# Patient Record
Sex: FEMALE | Race: WHITE | NOT HISPANIC OR LATINO | Employment: FULL TIME | URBAN - METROPOLITAN AREA
[De-identification: names, ages, dates, MRNs, and addresses within clinical notes are randomized per-mention and may not be internally consistent; named-entity substitution may affect disease eponyms.]

---

## 2017-01-04 ENCOUNTER — COMMUNICATION - HEALTHEAST (OUTPATIENT)
Dept: FAMILY MEDICINE | Facility: CLINIC | Age: 32
End: 2017-01-04

## 2017-02-01 ENCOUNTER — OFFICE VISIT - HEALTHEAST (OUTPATIENT)
Dept: FAMILY MEDICINE | Facility: CLINIC | Age: 32
End: 2017-02-01

## 2017-02-01 DIAGNOSIS — Z23 NEED FOR VACCINATION: ICD-10-CM

## 2017-02-01 DIAGNOSIS — N93.8 DYSFUNCTIONAL UTERINE BLEEDING: ICD-10-CM

## 2017-02-01 DIAGNOSIS — R10.2 PELVIC PAIN: ICD-10-CM

## 2017-02-20 ENCOUNTER — COMMUNICATION - HEALTHEAST (OUTPATIENT)
Dept: FAMILY MEDICINE | Facility: CLINIC | Age: 32
End: 2017-02-20

## 2017-02-24 ENCOUNTER — OFFICE VISIT - HEALTHEAST (OUTPATIENT)
Dept: FAMILY MEDICINE | Facility: CLINIC | Age: 32
End: 2017-02-24

## 2017-02-24 DIAGNOSIS — G47.33 OBSTRUCTIVE SLEEP APNEA (ADULT) (PEDIATRIC): ICD-10-CM

## 2017-02-24 DIAGNOSIS — E78.1 PURE HYPERGLYCERIDEMIA: ICD-10-CM

## 2017-02-24 ASSESSMENT — MIFFLIN-ST. JEOR: SCORE: 1734.82

## 2017-03-28 ENCOUNTER — OFFICE VISIT - HEALTHEAST (OUTPATIENT)
Dept: FAMILY MEDICINE | Facility: CLINIC | Age: 32
End: 2017-03-28

## 2017-03-28 ENCOUNTER — COMMUNICATION - HEALTHEAST (OUTPATIENT)
Dept: FAMILY MEDICINE | Facility: CLINIC | Age: 32
End: 2017-03-28

## 2017-03-28 DIAGNOSIS — Z30.9 CONTRACEPTION MANAGEMENT: ICD-10-CM

## 2017-03-28 DIAGNOSIS — E78.1 PURE HYPERGLYCERIDEMIA: ICD-10-CM

## 2017-03-28 DIAGNOSIS — G47.33 OBSTRUCTIVE SLEEP APNEA (ADULT) (PEDIATRIC): ICD-10-CM

## 2017-03-28 ASSESSMENT — MIFFLIN-ST. JEOR: SCORE: 1732.56

## 2017-04-25 ENCOUNTER — OFFICE VISIT - HEALTHEAST (OUTPATIENT)
Dept: FAMILY MEDICINE | Facility: CLINIC | Age: 32
End: 2017-04-25

## 2017-04-25 DIAGNOSIS — E78.1 PURE HYPERGLYCERIDEMIA: ICD-10-CM

## 2017-04-25 DIAGNOSIS — G47.33 OBSTRUCTIVE SLEEP APNEA (ADULT) (PEDIATRIC): ICD-10-CM

## 2017-04-25 LAB
CHOLEST SERPL-MCNC: 167 MG/DL
FASTING STATUS PATIENT QL REPORTED: YES
HBA1C MFR BLD: 5.1 % (ref 3.5–6)
HDLC SERPL-MCNC: 44 MG/DL
LDLC SERPL CALC-MCNC: 98 MG/DL
TRIGL SERPL-MCNC: 123 MG/DL

## 2017-04-25 ASSESSMENT — MIFFLIN-ST. JEOR: SCORE: 1737.55

## 2017-05-10 ENCOUNTER — COMMUNICATION - HEALTHEAST (OUTPATIENT)
Dept: FAMILY MEDICINE | Facility: CLINIC | Age: 32
End: 2017-05-10

## 2017-05-24 ENCOUNTER — COMMUNICATION - HEALTHEAST (OUTPATIENT)
Dept: FAMILY MEDICINE | Facility: CLINIC | Age: 32
End: 2017-05-24

## 2017-05-24 DIAGNOSIS — Z30.9 CONTRACEPTION MANAGEMENT: ICD-10-CM

## 2017-05-31 ENCOUNTER — COMMUNICATION - HEALTHEAST (OUTPATIENT)
Dept: FAMILY MEDICINE | Facility: CLINIC | Age: 32
End: 2017-05-31

## 2017-06-07 ENCOUNTER — OFFICE VISIT - HEALTHEAST (OUTPATIENT)
Dept: FAMILY MEDICINE | Facility: CLINIC | Age: 32
End: 2017-06-07

## 2017-06-07 DIAGNOSIS — F41.1 ANXIETY STATE: ICD-10-CM

## 2017-07-28 ENCOUNTER — COMMUNICATION - HEALTHEAST (OUTPATIENT)
Dept: FAMILY MEDICINE | Facility: CLINIC | Age: 32
End: 2017-07-28

## 2017-08-01 ENCOUNTER — OFFICE VISIT - HEALTHEAST (OUTPATIENT)
Dept: FAMILY MEDICINE | Facility: CLINIC | Age: 32
End: 2017-08-01

## 2017-08-01 DIAGNOSIS — N92.6 MISSED MENSES: ICD-10-CM

## 2017-08-14 ENCOUNTER — COMMUNICATION - HEALTHEAST (OUTPATIENT)
Dept: FAMILY MEDICINE | Facility: CLINIC | Age: 32
End: 2017-08-14

## 2017-08-15 ENCOUNTER — HOSPITAL ENCOUNTER (OUTPATIENT)
Dept: ULTRASOUND IMAGING | Facility: CLINIC | Age: 32
Discharge: HOME | End: 2017-08-15
Attending: FAMILY MEDICINE

## 2017-08-15 DIAGNOSIS — N92.6 MISSED MENSES: ICD-10-CM

## 2017-08-23 ENCOUNTER — COMMUNICATION - HEALTHEAST (OUTPATIENT)
Dept: FAMILY MEDICINE | Facility: CLINIC | Age: 32
End: 2017-08-23

## 2017-09-27 ENCOUNTER — COMMUNICATION - HEALTHEAST (OUTPATIENT)
Dept: FAMILY MEDICINE | Facility: CLINIC | Age: 32
End: 2017-09-27

## 2017-10-11 ENCOUNTER — PRENATAL OFFICE VISIT - HEALTHEAST (OUTPATIENT)
Dept: FAMILY MEDICINE | Facility: CLINIC | Age: 32
End: 2017-10-11

## 2017-10-11 ENCOUNTER — COMMUNICATION - HEALTHEAST (OUTPATIENT)
Dept: FAMILY MEDICINE | Facility: CLINIC | Age: 32
End: 2017-10-11

## 2017-10-11 DIAGNOSIS — Z12.4 CERVICAL CANCER SCREENING: ICD-10-CM

## 2017-10-11 DIAGNOSIS — F41.1 ANXIETY STATE: ICD-10-CM

## 2017-10-11 DIAGNOSIS — R07.9 CHEST PAIN: ICD-10-CM

## 2017-10-11 DIAGNOSIS — Z34.81 ENCOUNTER FOR SUPERVISION OF OTHER NORMAL PREGNANCY IN FIRST TRIMESTER: ICD-10-CM

## 2017-10-11 DIAGNOSIS — Z23 IMMUNIZATION DUE: ICD-10-CM

## 2017-10-11 LAB
ATRIAL RATE - MUSE: 80 BPM
DIASTOLIC BLOOD PRESSURE - MUSE: NORMAL MMHG
HIV 1+2 AB+HIV1 P24 AG SERPL QL IA: NEGATIVE
INTERPRETATION ECG - MUSE: NORMAL
P AXIS - MUSE: 53 DEGREES
PR INTERVAL - MUSE: 130 MS
QRS DURATION - MUSE: 78 MS
QT - MUSE: 364 MS
QTC - MUSE: 419 MS
R AXIS - MUSE: 48 DEGREES
SYSTOLIC BLOOD PRESSURE - MUSE: NORMAL MMHG
T AXIS - MUSE: 45 DEGREES
VENTRICULAR RATE- MUSE: 80 BPM

## 2017-10-12 LAB
ANTIBODY ID: NORMAL
HBV SURFACE AG SERPL QL IA: NEGATIVE
SYPHILIS RPR SCREEN - HISTORICAL: NORMAL

## 2017-10-16 ENCOUNTER — AMBULATORY - HEALTHEAST (OUTPATIENT)
Dept: FAMILY MEDICINE | Facility: CLINIC | Age: 32
End: 2017-10-16

## 2017-10-16 DIAGNOSIS — O36.0920: ICD-10-CM

## 2017-10-17 LAB
HPV INTERPRETATION - HISTORICAL: NORMAL
HPV INTERPRETER - HISTORICAL: NORMAL

## 2017-10-18 LAB
BKR LAB AP ABNORMAL BLEEDING: NO
BKR LAB AP BIRTH CONTROL/HORMONES: ABNORMAL
BKR LAB AP CERVICAL APPEARANCE: NORMAL
BKR LAB AP GYN ADEQUACY: ABNORMAL
BKR LAB AP GYN INTERPRETATION: ABNORMAL
BKR LAB AP HPV REFLEX: ABNORMAL
BKR LAB AP LMP: ABNORMAL
BKR LAB AP PATIENT STATUS: ABNORMAL
BKR LAB AP PREVIOUS ABNORMAL: ABNORMAL
BKR LAB AP PREVIOUS NORMAL: ABNORMAL
HIGH RISK?: NO
INTERPRETING LAB: ABNORMAL
PATH REPORT.COMMENTS IMP SPEC: ABNORMAL
RESULT FLAG (HE HISTORICAL CONVERSION): ABNORMAL

## 2017-10-19 ENCOUNTER — COMMUNICATION - HEALTHEAST (OUTPATIENT)
Dept: FAMILY MEDICINE | Facility: CLINIC | Age: 32
End: 2017-10-19

## 2017-10-19 LAB
ANTIBODY TITERED: NORMAL
SPECIMEN STATUS: NORMAL
TITER RESULT IAT: NORMAL

## 2017-10-24 ENCOUNTER — COMMUNICATION - HEALTHEAST (OUTPATIENT)
Dept: FAMILY MEDICINE | Facility: CLINIC | Age: 32
End: 2017-10-24

## 2017-10-25 ENCOUNTER — COMMUNICATION - HEALTHEAST (OUTPATIENT)
Dept: FAMILY MEDICINE | Facility: CLINIC | Age: 32
End: 2017-10-25

## 2017-10-27 ENCOUNTER — RECORDS - HEALTHEAST (OUTPATIENT)
Dept: ADMINISTRATIVE | Facility: OTHER | Age: 32
End: 2017-10-27

## 2017-11-01 ENCOUNTER — RECORDS - HEALTHEAST (OUTPATIENT)
Dept: ADMINISTRATIVE | Facility: OTHER | Age: 32
End: 2017-11-01

## 2017-11-14 ENCOUNTER — PRENATAL OFFICE VISIT - HEALTHEAST (OUTPATIENT)
Dept: FAMILY MEDICINE | Facility: CLINIC | Age: 32
End: 2017-11-14

## 2017-11-14 DIAGNOSIS — Z34.81 ENCOUNTER FOR SUPERVISION OF OTHER NORMAL PREGNANCY IN FIRST TRIMESTER: ICD-10-CM

## 2017-11-14 DIAGNOSIS — O36.0920: ICD-10-CM

## 2017-11-15 LAB — ANTIBODY SCREEN: NORMAL

## 2017-11-16 LAB — ANTIBODY UNIDENTIFIED: NORMAL

## 2017-11-21 ENCOUNTER — HOSPITAL ENCOUNTER (OUTPATIENT)
Dept: ULTRASOUND IMAGING | Facility: CLINIC | Age: 32
Discharge: HOME | End: 2017-11-21
Attending: FAMILY MEDICINE

## 2017-11-21 DIAGNOSIS — Z34.81 ENCOUNTER FOR SUPERVISION OF OTHER NORMAL PREGNANCY IN FIRST TRIMESTER: ICD-10-CM

## 2017-12-04 ENCOUNTER — COMMUNICATION - HEALTHEAST (OUTPATIENT)
Dept: FAMILY MEDICINE | Facility: CLINIC | Age: 32
End: 2017-12-04

## 2017-12-12 ENCOUNTER — PRENATAL OFFICE VISIT - HEALTHEAST (OUTPATIENT)
Dept: FAMILY MEDICINE | Facility: CLINIC | Age: 32
End: 2017-12-12

## 2017-12-12 DIAGNOSIS — O09.892 SUPERVISION OF OTHER HIGH RISK PREGNANCIES, SECOND TRIMESTER: ICD-10-CM

## 2017-12-12 DIAGNOSIS — O36.0920: ICD-10-CM

## 2017-12-15 ENCOUNTER — COMMUNICATION - HEALTHEAST (OUTPATIENT)
Dept: FAMILY MEDICINE | Facility: CLINIC | Age: 32
End: 2017-12-15

## 2017-12-26 ENCOUNTER — COMMUNICATION - HEALTHEAST (OUTPATIENT)
Dept: FAMILY MEDICINE | Facility: CLINIC | Age: 32
End: 2017-12-26

## 2018-01-04 ENCOUNTER — HOSPITAL ENCOUNTER (OUTPATIENT)
Dept: ULTRASOUND IMAGING | Facility: CLINIC | Age: 33
Discharge: HOME | End: 2018-01-04
Attending: FAMILY MEDICINE

## 2018-01-04 ENCOUNTER — COMMUNICATION - HEALTHEAST (OUTPATIENT)
Dept: FAMILY MEDICINE | Facility: CLINIC | Age: 33
End: 2018-01-04

## 2018-01-04 DIAGNOSIS — O36.8190 DECREASED FETAL MOVEMENT: ICD-10-CM

## 2018-01-08 ENCOUNTER — PRENATAL OFFICE VISIT - HEALTHEAST (OUTPATIENT)
Dept: FAMILY MEDICINE | Facility: CLINIC | Age: 33
End: 2018-01-08

## 2018-01-08 DIAGNOSIS — Z34.90 SUPERVISION OF NORMAL PREGNANCY: ICD-10-CM

## 2018-01-08 DIAGNOSIS — O36.0920: ICD-10-CM

## 2018-01-08 LAB
ALBUMIN UR-MCNC: NEGATIVE MG/DL
APPEARANCE UR: ABNORMAL
BILIRUB UR QL STRIP: NEGATIVE
COLOR UR AUTO: YELLOW
FASTING STATUS PATIENT QL REPORTED: ABNORMAL
GLUCOSE 1H P 50 G GLC PO SERPL-MCNC: 68 MG/DL (ref 70–139)
GLUCOSE UR STRIP-MCNC: NEGATIVE MG/DL
HGB BLD-MCNC: 11.9 G/DL (ref 12–16)
HGB UR QL STRIP: ABNORMAL
KETONES UR STRIP-MCNC: NEGATIVE MG/DL
LEUKOCYTE ESTERASE UR QL STRIP: ABNORMAL
NITRATE UR QL: NEGATIVE
PH UR STRIP: 8.5 [PH] (ref 5–8)
SP GR UR STRIP: 1.01 (ref 1–1.03)
UROBILINOGEN UR STRIP-ACNC: ABNORMAL

## 2018-01-09 LAB
ANTIBODY SCREEN: NEGATIVE
SYPHILIS RPR SCREEN - HISTORICAL: NORMAL

## 2018-01-14 ENCOUNTER — COMMUNICATION - HEALTHEAST (OUTPATIENT)
Dept: FAMILY MEDICINE | Facility: CLINIC | Age: 33
End: 2018-01-14

## 2018-01-15 ENCOUNTER — COMMUNICATION - HEALTHEAST (OUTPATIENT)
Dept: FAMILY MEDICINE | Facility: CLINIC | Age: 33
End: 2018-01-15

## 2018-01-22 ENCOUNTER — COMMUNICATION - HEALTHEAST (OUTPATIENT)
Dept: FAMILY MEDICINE | Facility: CLINIC | Age: 33
End: 2018-01-22

## 2018-01-30 ENCOUNTER — PRENATAL OFFICE VISIT - HEALTHEAST (OUTPATIENT)
Dept: FAMILY MEDICINE | Facility: CLINIC | Age: 33
End: 2018-01-30

## 2018-01-30 DIAGNOSIS — Z34.82 ENCOUNTER FOR SUPERVISION OF OTHER NORMAL PREGNANCY IN SECOND TRIMESTER: ICD-10-CM

## 2018-01-30 DIAGNOSIS — O36.0920: ICD-10-CM

## 2018-01-31 ENCOUNTER — HOSPITAL ENCOUNTER (OUTPATIENT)
Dept: ULTRASOUND IMAGING | Facility: CLINIC | Age: 33
Discharge: HOME | End: 2018-01-31
Attending: FAMILY MEDICINE

## 2018-01-31 DIAGNOSIS — Z34.82 ENCOUNTER FOR SUPERVISION OF OTHER NORMAL PREGNANCY IN SECOND TRIMESTER: ICD-10-CM

## 2018-02-01 LAB
ANTIBODY ID: NORMAL
ANTIBODY SCREEN: POSITIVE

## 2018-02-06 ENCOUNTER — COMMUNICATION - HEALTHEAST (OUTPATIENT)
Dept: FAMILY MEDICINE | Facility: CLINIC | Age: 33
End: 2018-02-06

## 2018-02-06 LAB
ANTIBODY TITERED: NORMAL
ANTIBODY TITERED: NORMAL
MISCELLANEOUS TEST DEPT. - HE HISTORICAL: NORMAL
PERFORMING LAB: NORMAL
SPECIMEN STATUS: NORMAL
TEST NAME: NORMAL
TITER RESULT IAT: NORMAL
TITER RESULT IAT: NORMAL

## 2018-02-08 ENCOUNTER — OFFICE VISIT - HEALTHEAST (OUTPATIENT)
Dept: FAMILY MEDICINE | Facility: CLINIC | Age: 33
End: 2018-02-08

## 2018-02-08 DIAGNOSIS — Z34.90 PREGNANCY: ICD-10-CM

## 2018-02-08 DIAGNOSIS — B34.9 VIRAL SYNDROME: ICD-10-CM

## 2018-02-08 DIAGNOSIS — R05.9 COUGH: ICD-10-CM

## 2018-02-08 LAB
FLUAV AG SPEC QL IA: NORMAL
FLUBV AG SPEC QL IA: NORMAL

## 2018-02-13 ENCOUNTER — PRENATAL OFFICE VISIT - HEALTHEAST (OUTPATIENT)
Dept: FAMILY MEDICINE | Facility: CLINIC | Age: 33
End: 2018-02-13

## 2018-02-13 DIAGNOSIS — O36.0920 ANTIBODY E ISOIMMUNIZATION AFFECTING PREGNANCY IN SECOND TRIMESTER, SINGLE OR UNSPECIFIED FETUS: ICD-10-CM

## 2018-02-13 DIAGNOSIS — Z34.93 SUPERVISION OF NORMAL PREGNANCY IN THIRD TRIMESTER: ICD-10-CM

## 2018-02-13 LAB
ALBUMIN UR-MCNC: NEGATIVE MG/DL
APPEARANCE UR: CLEAR
BILIRUB UR QL STRIP: NEGATIVE
COLOR UR AUTO: YELLOW
GLUCOSE UR STRIP-MCNC: NEGATIVE MG/DL
HGB UR QL STRIP: NEGATIVE
KETONES UR STRIP-MCNC: NEGATIVE MG/DL
LEUKOCYTE ESTERASE UR QL STRIP: NEGATIVE
NITRATE UR QL: NEGATIVE
PH UR STRIP: 6 [PH] (ref 5–8)
SP GR UR STRIP: 1.01 (ref 1–1.03)
UROBILINOGEN UR STRIP-ACNC: NORMAL

## 2018-02-21 ENCOUNTER — HOSPITAL ENCOUNTER (OUTPATIENT)
Dept: MEDSURG UNIT | Facility: CLINIC | Age: 33
Discharge: HOME | End: 2018-02-21
Attending: FAMILY MEDICINE | Admitting: FAMILY MEDICINE

## 2018-02-21 ENCOUNTER — COMMUNICATION - HEALTHEAST (OUTPATIENT)
Dept: FAMILY MEDICINE | Facility: CLINIC | Age: 33
End: 2018-02-21

## 2018-02-21 ASSESSMENT — MIFFLIN-ST. JEOR: SCORE: 1754.56

## 2018-02-27 ENCOUNTER — PRENATAL OFFICE VISIT - HEALTHEAST (OUTPATIENT)
Dept: FAMILY MEDICINE | Facility: CLINIC | Age: 33
End: 2018-02-27

## 2018-02-27 DIAGNOSIS — Z34.82 ENCOUNTER FOR SUPERVISION OF OTHER NORMAL PREGNANCY IN SECOND TRIMESTER: ICD-10-CM

## 2018-02-27 DIAGNOSIS — Z34.81 ENCOUNTER FOR SUPERVISION OF OTHER NORMAL PREGNANCY IN FIRST TRIMESTER: ICD-10-CM

## 2018-02-27 DIAGNOSIS — O36.0920: ICD-10-CM

## 2018-02-27 DIAGNOSIS — O36.0920 ANTIBODY E ISOIMMUNIZATION AFFECTING PREGNANCY IN SECOND TRIMESTER, SINGLE OR UNSPECIFIED FETUS: ICD-10-CM

## 2018-02-27 DIAGNOSIS — L30.9 DERMATITIS: ICD-10-CM

## 2018-02-28 LAB
ANTIBODY ID: NORMAL
ANTIBODY SCREEN: ABNORMAL

## 2018-03-03 LAB
ANTIBODY TITERED: NORMAL
ANTIBODY TITERED: NORMAL
SPECIMEN STATUS: NORMAL
SPECIMEN STATUS: NORMAL
TITER RESULT IAT: NORMAL
TITER RESULT IAT: NORMAL

## 2018-03-08 ENCOUNTER — HOSPITAL ENCOUNTER (OUTPATIENT)
Dept: ULTRASOUND IMAGING | Facility: CLINIC | Age: 33
Discharge: HOME | End: 2018-03-08
Attending: FAMILY MEDICINE

## 2018-03-08 DIAGNOSIS — Z34.83 ENCOUNTER FOR SUPERVISION OF OTHER NORMAL PREGNANCY IN THIRD TRIMESTER: ICD-10-CM

## 2018-03-13 ENCOUNTER — PRENATAL OFFICE VISIT - HEALTHEAST (OUTPATIENT)
Dept: FAMILY MEDICINE | Facility: CLINIC | Age: 33
End: 2018-03-13

## 2018-03-13 DIAGNOSIS — Z34.90 SUPERVISION OF NORMAL PREGNANCY: ICD-10-CM

## 2018-03-13 LAB
ALBUMIN UR-MCNC: NEGATIVE MG/DL
ALT SERPL W P-5'-P-CCNC: <9 U/L (ref 0–45)
APPEARANCE UR: ABNORMAL
APTT PPP: 28 SECONDS (ref 24–37)
AST SERPL W P-5'-P-CCNC: 9 U/L (ref 0–40)
BILIRUB UR QL STRIP: NEGATIVE
BUN SERPL-MCNC: 8 MG/DL (ref 8–22)
COLOR UR AUTO: YELLOW
CREAT SERPL-MCNC: 0.57 MG/DL (ref 0.6–1.1)
ERYTHROCYTE [DISTWIDTH] IN BLOOD BY AUTOMATED COUNT: 11.3 % (ref 11–14.5)
GFR SERPL CREATININE-BSD FRML MDRD: >60 ML/MIN/1.73M2
GLUCOSE UR STRIP-MCNC: NEGATIVE MG/DL
HCT VFR BLD AUTO: 35.5 % (ref 35–47)
HGB BLD-MCNC: 11.9 G/DL (ref 12–16)
HGB UR QL STRIP: ABNORMAL
INR PPP: 0.94 (ref 0.9–1.1)
KETONES UR STRIP-MCNC: NEGATIVE MG/DL
LEUKOCYTE ESTERASE UR QL STRIP: ABNORMAL
MCH RBC QN AUTO: 28.4 PG (ref 27–34)
MCHC RBC AUTO-ENTMCNC: 33.6 G/DL (ref 32–36)
MCV RBC AUTO: 85 FL (ref 80–100)
NITRATE UR QL: NEGATIVE
PH UR STRIP: 5.5 [PH] (ref 5–8)
PLATELET # BLD AUTO: 180 THOU/UL (ref 140–440)
PMV BLD AUTO: 8.8 FL (ref 7–10)
RBC # BLD AUTO: 4.18 MILL/UL (ref 3.8–5.4)
SP GR UR STRIP: 1.02 (ref 1–1.03)
URATE SERPL-MCNC: 4.2 MG/DL (ref 2–7.5)
UROBILINOGEN UR STRIP-ACNC: ABNORMAL
WBC: 8.8 THOU/UL (ref 4–11)

## 2018-03-14 ENCOUNTER — AMBULATORY - HEALTHEAST (OUTPATIENT)
Dept: NURSING | Facility: CLINIC | Age: 33
End: 2018-03-14

## 2018-03-15 ENCOUNTER — COMMUNICATION - HEALTHEAST (OUTPATIENT)
Dept: FAMILY MEDICINE | Facility: CLINIC | Age: 33
End: 2018-03-15

## 2018-03-15 LAB
ALLERGIC TO PENICILLIN: NO
GP B STREP DNA SPEC QL NAA+PROBE: NEGATIVE

## 2018-03-16 ENCOUNTER — COMMUNICATION - HEALTHEAST (OUTPATIENT)
Dept: FAMILY MEDICINE | Facility: CLINIC | Age: 33
End: 2018-03-16

## 2018-03-20 ENCOUNTER — COMMUNICATION - HEALTHEAST (OUTPATIENT)
Dept: FAMILY MEDICINE | Facility: CLINIC | Age: 33
End: 2018-03-20

## 2018-03-22 ENCOUNTER — HOSPITAL ENCOUNTER (OUTPATIENT)
Dept: MEDSURG UNIT | Facility: CLINIC | Age: 33
Discharge: HOME | End: 2018-03-22
Attending: FAMILY MEDICINE | Admitting: FAMILY MEDICINE

## 2018-03-23 ENCOUNTER — PRENATAL OFFICE VISIT - HEALTHEAST (OUTPATIENT)
Dept: FAMILY MEDICINE | Facility: CLINIC | Age: 33
End: 2018-03-23

## 2018-03-23 DIAGNOSIS — Z34.90 SUPERVISION OF NORMAL PREGNANCY: ICD-10-CM

## 2018-03-23 DIAGNOSIS — O36.0920: ICD-10-CM

## 2018-03-23 DIAGNOSIS — Z34.82 ENCOUNTER FOR SUPERVISION OF OTHER NORMAL PREGNANCY IN SECOND TRIMESTER: ICD-10-CM

## 2018-03-23 LAB
ALBUMIN UR-MCNC: ABNORMAL MG/DL
ALT SERPL W P-5'-P-CCNC: <9 U/L (ref 0–45)
APPEARANCE UR: CLEAR
APTT PPP: 27 SECONDS (ref 24–37)
AST SERPL W P-5'-P-CCNC: 9 U/L (ref 0–40)
BILIRUB UR QL STRIP: NEGATIVE
BUN SERPL-MCNC: 7 MG/DL (ref 8–22)
COLOR UR AUTO: YELLOW
CREAT SERPL-MCNC: 0.55 MG/DL (ref 0.6–1.1)
CREAT UR-MCNC: 156.8 MG/DL
ERYTHROCYTE [DISTWIDTH] IN BLOOD BY AUTOMATED COUNT: 11.6 % (ref 11–14.5)
GFR SERPL CREATININE-BSD FRML MDRD: >60 ML/MIN/1.73M2
GLUCOSE UR STRIP-MCNC: NEGATIVE MG/DL
HCT VFR BLD AUTO: 35.1 % (ref 35–47)
HGB BLD-MCNC: 11.8 G/DL (ref 12–16)
HGB UR QL STRIP: NEGATIVE
INR PPP: 0.91 (ref 0.9–1.1)
KETONES UR STRIP-MCNC: NEGATIVE MG/DL
LEUKOCYTE ESTERASE UR QL STRIP: ABNORMAL
MCH RBC QN AUTO: 28.4 PG (ref 27–34)
MCHC RBC AUTO-ENTMCNC: 33.7 G/DL (ref 32–36)
MCV RBC AUTO: 84 FL (ref 80–100)
NITRATE UR QL: NEGATIVE
PH UR STRIP: 5.5 [PH] (ref 5–8)
PLATELET # BLD AUTO: 176 THOU/UL (ref 140–440)
PMV BLD AUTO: 9.3 FL (ref 7–10)
PROTEIN, RANDOM URINE - HISTORICAL: 14 MG/DL
PROTEIN/CREAT RATIO, RANDOM UR: 0.09
RBC # BLD AUTO: 4.15 MILL/UL (ref 3.8–5.4)
SP GR UR STRIP: >=1.03 (ref 1–1.03)
URATE SERPL-MCNC: 4.4 MG/DL (ref 2–7.5)
UROBILINOGEN UR STRIP-ACNC: ABNORMAL
WBC: 7.5 THOU/UL (ref 4–11)

## 2018-03-24 LAB — ANTIBODY ID: NORMAL

## 2018-03-27 ENCOUNTER — PRENATAL OFFICE VISIT - HEALTHEAST (OUTPATIENT)
Dept: FAMILY MEDICINE | Facility: CLINIC | Age: 33
End: 2018-03-27

## 2018-03-27 DIAGNOSIS — Z34.90 SUPERVISION OF NORMAL PREGNANCY: ICD-10-CM

## 2018-03-27 LAB
ALBUMIN UR-MCNC: ABNORMAL MG/DL
APPEARANCE UR: ABNORMAL
BILIRUB UR QL STRIP: NEGATIVE
COLOR UR AUTO: YELLOW
GLUCOSE UR STRIP-MCNC: NEGATIVE MG/DL
HGB UR QL STRIP: NEGATIVE
KETONES UR STRIP-MCNC: ABNORMAL MG/DL
LEUKOCYTE ESTERASE UR QL STRIP: ABNORMAL
NITRATE UR QL: NEGATIVE
PH UR STRIP: 7.5 [PH] (ref 5–8)
SP GR UR STRIP: 1.02 (ref 1–1.03)
UROBILINOGEN UR STRIP-ACNC: ABNORMAL

## 2018-03-29 ENCOUNTER — ANESTHESIA - HEALTHEAST (OUTPATIENT)
Dept: OBGYN | Facility: CLINIC | Age: 33
End: 2018-03-29

## 2018-03-29 LAB
ANTIBODY TITERED: NORMAL
SPECIMEN STATUS: NORMAL
TITER RESULT IAT: NORMAL

## 2018-03-31 ENCOUNTER — HOME CARE/HOSPICE - HEALTHEAST (OUTPATIENT)
Dept: HOME HEALTH SERVICES | Facility: HOME HEALTH | Age: 33
End: 2018-03-31

## 2018-04-02 ENCOUNTER — HOME CARE/HOSPICE - HEALTHEAST (OUTPATIENT)
Dept: HOME HEALTH SERVICES | Facility: HOME HEALTH | Age: 33
End: 2018-04-02

## 2018-04-19 ENCOUNTER — COMMUNICATION - HEALTHEAST (OUTPATIENT)
Dept: FAMILY MEDICINE | Facility: CLINIC | Age: 33
End: 2018-04-19

## 2018-04-19 DIAGNOSIS — F41.1 ANXIETY STATE: ICD-10-CM

## 2018-05-11 ENCOUNTER — OFFICE VISIT - HEALTHEAST (OUTPATIENT)
Dept: FAMILY MEDICINE | Facility: CLINIC | Age: 33
End: 2018-05-11

## 2018-05-11 DIAGNOSIS — Z30.9 CONTRACEPTION MANAGEMENT: ICD-10-CM

## 2018-05-11 DIAGNOSIS — F41.1 ANXIETY STATE: ICD-10-CM

## 2018-06-06 ENCOUNTER — COMMUNICATION - HEALTHEAST (OUTPATIENT)
Dept: FAMILY MEDICINE | Facility: CLINIC | Age: 33
End: 2018-06-06

## 2018-06-28 ENCOUNTER — COMMUNICATION - HEALTHEAST (OUTPATIENT)
Dept: FAMILY MEDICINE | Facility: CLINIC | Age: 33
End: 2018-06-28

## 2018-07-06 ENCOUNTER — COMMUNICATION - HEALTHEAST (OUTPATIENT)
Dept: FAMILY MEDICINE | Facility: CLINIC | Age: 33
End: 2018-07-06

## 2018-07-06 DIAGNOSIS — F41.1 ANXIETY STATE: ICD-10-CM

## 2018-07-09 ENCOUNTER — COMMUNICATION - HEALTHEAST (OUTPATIENT)
Dept: FAMILY MEDICINE | Facility: CLINIC | Age: 33
End: 2018-07-09

## 2018-07-09 DIAGNOSIS — Z30.9 CONTRACEPTION MANAGEMENT: ICD-10-CM

## 2018-08-16 ENCOUNTER — COMMUNICATION - HEALTHEAST (OUTPATIENT)
Dept: FAMILY MEDICINE | Facility: CLINIC | Age: 33
End: 2018-08-16

## 2018-09-10 ENCOUNTER — COMMUNICATION - HEALTHEAST (OUTPATIENT)
Dept: FAMILY MEDICINE | Facility: CLINIC | Age: 33
End: 2018-09-10

## 2018-09-10 DIAGNOSIS — Z30.9 CONTRACEPTION MANAGEMENT: ICD-10-CM

## 2018-09-15 ENCOUNTER — COMMUNICATION - HEALTHEAST (OUTPATIENT)
Dept: FAMILY MEDICINE | Facility: CLINIC | Age: 33
End: 2018-09-15

## 2018-10-24 ENCOUNTER — RECORDS - HEALTHEAST (OUTPATIENT)
Dept: ADMINISTRATIVE | Facility: OTHER | Age: 33
End: 2018-10-24

## 2018-11-03 ENCOUNTER — RECORDS - HEALTHEAST (OUTPATIENT)
Dept: ADMINISTRATIVE | Facility: OTHER | Age: 33
End: 2018-11-03

## 2018-11-12 ENCOUNTER — COMMUNICATION - HEALTHEAST (OUTPATIENT)
Dept: FAMILY MEDICINE | Facility: CLINIC | Age: 33
End: 2018-11-12

## 2018-11-12 DIAGNOSIS — R06.81 APNEA: ICD-10-CM

## 2018-11-29 ENCOUNTER — COMMUNICATION - HEALTHEAST (OUTPATIENT)
Dept: FAMILY MEDICINE | Facility: CLINIC | Age: 33
End: 2018-11-29

## 2018-12-17 ENCOUNTER — COMMUNICATION - HEALTHEAST (OUTPATIENT)
Dept: FAMILY MEDICINE | Facility: CLINIC | Age: 33
End: 2018-12-17

## 2019-01-05 ENCOUNTER — COMMUNICATION - HEALTHEAST (OUTPATIENT)
Dept: SCHEDULING | Facility: CLINIC | Age: 34
End: 2019-01-05

## 2019-02-11 ENCOUNTER — COMMUNICATION - HEALTHEAST (OUTPATIENT)
Dept: FAMILY MEDICINE | Facility: CLINIC | Age: 34
End: 2019-02-11

## 2019-02-15 ENCOUNTER — AMBULATORY - HEALTHEAST (OUTPATIENT)
Dept: SLEEP MEDICINE | Facility: CLINIC | Age: 34
End: 2019-02-15

## 2019-02-15 ENCOUNTER — OFFICE VISIT - HEALTHEAST (OUTPATIENT)
Dept: SLEEP MEDICINE | Facility: CLINIC | Age: 34
End: 2019-02-15

## 2019-02-15 DIAGNOSIS — E66.01 MORBID OBESITY (H): ICD-10-CM

## 2019-02-15 DIAGNOSIS — G47.33 OBSTRUCTIVE SLEEP APNEA: ICD-10-CM

## 2019-02-15 DIAGNOSIS — G47.8 SLEEP DYSFUNCTION WITH SLEEP STAGE DISTURBANCE: ICD-10-CM

## 2019-02-15 DIAGNOSIS — G47.10 HYPERSOMNIA: ICD-10-CM

## 2019-02-15 ASSESSMENT — MIFFLIN-ST. JEOR: SCORE: 1799.92

## 2019-03-01 ENCOUNTER — COMMUNICATION - HEALTHEAST (OUTPATIENT)
Dept: FAMILY MEDICINE | Facility: CLINIC | Age: 34
End: 2019-03-01

## 2019-03-06 ENCOUNTER — COMMUNICATION - HEALTHEAST (OUTPATIENT)
Dept: FAMILY MEDICINE | Facility: CLINIC | Age: 34
End: 2019-03-06

## 2019-04-12 ENCOUNTER — OFFICE VISIT - HEALTHEAST (OUTPATIENT)
Dept: FAMILY MEDICINE | Facility: CLINIC | Age: 34
End: 2019-04-12

## 2019-04-12 DIAGNOSIS — Z12.4 CERVICAL CANCER SCREENING: ICD-10-CM

## 2019-04-12 DIAGNOSIS — Z00.00 ROUTINE GENERAL MEDICAL EXAMINATION AT A HEALTH CARE FACILITY: ICD-10-CM

## 2019-04-12 DIAGNOSIS — Z30.40 ENCOUNTER FOR SURVEILLANCE OF CONTRACEPTIVES, UNSPECIFIED CONTRACEPTIVE: ICD-10-CM

## 2019-04-12 DIAGNOSIS — Z13.220 LIPID SCREENING: ICD-10-CM

## 2019-04-12 DIAGNOSIS — D62 ANEMIA DUE TO BLOOD LOSS, ACUTE: ICD-10-CM

## 2019-04-12 DIAGNOSIS — Z13.1 SCREENING FOR DIABETES MELLITUS: ICD-10-CM

## 2019-04-12 LAB
CHOLEST SERPL-MCNC: 151 MG/DL
ERYTHROCYTE [DISTWIDTH] IN BLOOD BY AUTOMATED COUNT: 12.2 % (ref 11–14.5)
FASTING STATUS PATIENT QL REPORTED: YES
FASTING STATUS PATIENT QL REPORTED: YES
GLUCOSE BLD-MCNC: 81 MG/DL (ref 79–116)
HCT VFR BLD AUTO: 44.5 % (ref 35–47)
HDLC SERPL-MCNC: 44 MG/DL
HGB BLD-MCNC: 14.8 G/DL (ref 12–16)
LDLC SERPL CALC-MCNC: 86 MG/DL
MCH RBC QN AUTO: 29.9 PG (ref 27–34)
MCHC RBC AUTO-ENTMCNC: 33.3 G/DL (ref 32–36)
MCV RBC AUTO: 90 FL (ref 80–100)
PLATELET # BLD AUTO: 209 THOU/UL (ref 140–440)
PMV BLD AUTO: 8.3 FL (ref 7–10)
RBC # BLD AUTO: 4.96 MILL/UL (ref 3.8–5.4)
TRIGL SERPL-MCNC: 106 MG/DL
TSH SERPL DL<=0.005 MIU/L-ACNC: 1.75 UIU/ML (ref 0.3–5)
WBC: 7.7 THOU/UL (ref 4–11)

## 2019-04-12 ASSESSMENT — MIFFLIN-ST. JEOR: SCORE: 1791.98

## 2019-04-15 LAB
25(OH)D3 SERPL-MCNC: 23.1 NG/ML (ref 30–80)
25(OH)D3 SERPL-MCNC: 23.1 NG/ML (ref 30–80)
HPV SOURCE: NORMAL
HUMAN PAPILLOMA VIRUS 16 DNA: NEGATIVE
HUMAN PAPILLOMA VIRUS 18 DNA: NEGATIVE
HUMAN PAPILLOMA VIRUS FINAL DIAGNOSIS: NORMAL
HUMAN PAPILLOMA VIRUS OTHER HR: NEGATIVE
SPECIMEN DESCRIPTION: NORMAL

## 2019-04-19 LAB
BKR LAB AP ABNORMAL BLEEDING: NO
BKR LAB AP BIRTH CONTROL/HORMONES: NORMAL
BKR LAB AP CERVICAL APPEARANCE: NORMAL
BKR LAB AP GYN ADEQUACY: NORMAL
BKR LAB AP GYN INTERPRETATION: NORMAL
BKR LAB AP HPV REFLEX: NORMAL
BKR LAB AP LMP: NORMAL
BKR LAB AP PATIENT STATUS: NORMAL
BKR LAB AP PREVIOUS ABNORMAL: NORMAL
BKR LAB AP PREVIOUS NORMAL: 2015
HIGH RISK?: NO
PATH REPORT.COMMENTS IMP SPEC: NORMAL
RESULT FLAG (HE HISTORICAL CONVERSION): NORMAL

## 2019-04-23 ENCOUNTER — COMMUNICATION - HEALTHEAST (OUTPATIENT)
Dept: FAMILY MEDICINE | Facility: CLINIC | Age: 34
End: 2019-04-23

## 2019-05-25 ENCOUNTER — COMMUNICATION - HEALTHEAST (OUTPATIENT)
Dept: FAMILY MEDICINE | Facility: CLINIC | Age: 34
End: 2019-05-25

## 2019-05-25 DIAGNOSIS — F41.1 ANXIETY STATE: ICD-10-CM

## 2019-05-29 ENCOUNTER — COMMUNICATION - HEALTHEAST (OUTPATIENT)
Dept: FAMILY MEDICINE | Facility: CLINIC | Age: 34
End: 2019-05-29

## 2019-06-04 ENCOUNTER — OFFICE VISIT - HEALTHEAST (OUTPATIENT)
Dept: FAMILY MEDICINE | Facility: CLINIC | Age: 34
End: 2019-06-04

## 2019-06-04 DIAGNOSIS — F41.1 ANXIETY STATE: ICD-10-CM

## 2019-06-07 ENCOUNTER — COMMUNICATION - HEALTHEAST (OUTPATIENT)
Dept: FAMILY MEDICINE | Facility: CLINIC | Age: 34
End: 2019-06-07

## 2019-06-07 DIAGNOSIS — F41.1 ANXIETY STATE: ICD-10-CM

## 2019-06-21 ENCOUNTER — OFFICE VISIT - HEALTHEAST (OUTPATIENT)
Dept: FAMILY MEDICINE | Facility: CLINIC | Age: 34
End: 2019-06-21

## 2019-06-21 DIAGNOSIS — I83.811 VARICOSE VEINS OF RIGHT LOWER EXTREMITY WITH PAIN: ICD-10-CM

## 2019-06-21 DIAGNOSIS — F41.1 ANXIETY STATE: ICD-10-CM

## 2019-07-01 ENCOUNTER — COMMUNICATION - HEALTHEAST (OUTPATIENT)
Dept: FAMILY MEDICINE | Facility: CLINIC | Age: 34
End: 2019-07-01

## 2019-07-01 DIAGNOSIS — F41.1 ANXIETY STATE: ICD-10-CM

## 2019-07-25 ENCOUNTER — COMMUNICATION - HEALTHEAST (OUTPATIENT)
Dept: FAMILY MEDICINE | Facility: CLINIC | Age: 34
End: 2019-07-25

## 2019-07-25 ENCOUNTER — OFFICE VISIT - HEALTHEAST (OUTPATIENT)
Dept: FAMILY MEDICINE | Facility: CLINIC | Age: 34
End: 2019-07-25

## 2019-07-25 DIAGNOSIS — R20.2 FACIAL PARESTHESIA: ICD-10-CM

## 2019-07-25 LAB
ALBUMIN SERPL-MCNC: 3.8 G/DL (ref 3.5–5)
ALP SERPL-CCNC: 67 U/L (ref 45–120)
ALT SERPL W P-5'-P-CCNC: 36 U/L (ref 0–45)
ANION GAP SERPL CALCULATED.3IONS-SCNC: 10 MMOL/L (ref 5–18)
AST SERPL W P-5'-P-CCNC: 26 U/L (ref 0–40)
BASOPHILS # BLD AUTO: 0 THOU/UL (ref 0–0.2)
BASOPHILS NFR BLD AUTO: 1 % (ref 0–2)
BILIRUB SERPL-MCNC: 0.2 MG/DL (ref 0–1)
BUN SERPL-MCNC: 15 MG/DL (ref 8–22)
CALCIUM SERPL-MCNC: 10.3 MG/DL (ref 8.5–10.5)
CHLORIDE BLD-SCNC: 102 MMOL/L (ref 98–107)
CO2 SERPL-SCNC: 26 MMOL/L (ref 22–31)
CREAT SERPL-MCNC: 0.75 MG/DL (ref 0.6–1.1)
EOSINOPHIL # BLD AUTO: 0.1 THOU/UL (ref 0–0.4)
EOSINOPHIL NFR BLD AUTO: 2 % (ref 0–6)
ERYTHROCYTE [DISTWIDTH] IN BLOOD BY AUTOMATED COUNT: 12 % (ref 11–14.5)
GFR SERPL CREATININE-BSD FRML MDRD: >60 ML/MIN/1.73M2
GLUCOSE BLD-MCNC: 83 MG/DL (ref 70–125)
HCT VFR BLD AUTO: 42.9 % (ref 35–47)
HGB BLD-MCNC: 14.4 G/DL (ref 12–16)
LYMPHOCYTES # BLD AUTO: 1.9 THOU/UL (ref 0.8–4.4)
LYMPHOCYTES NFR BLD AUTO: 27 % (ref 20–40)
MAGNESIUM SERPL-MCNC: 2.1 MG/DL (ref 1.8–2.6)
MCH RBC QN AUTO: 29.5 PG (ref 27–34)
MCHC RBC AUTO-ENTMCNC: 33.6 G/DL (ref 32–36)
MCV RBC AUTO: 88 FL (ref 80–100)
MONOCYTES # BLD AUTO: 0.4 THOU/UL (ref 0–0.9)
MONOCYTES NFR BLD AUTO: 5 % (ref 2–10)
NEUTROPHILS # BLD AUTO: 4.7 THOU/UL (ref 2–7.7)
NEUTROPHILS NFR BLD AUTO: 66 % (ref 50–70)
PLATELET # BLD AUTO: 185 THOU/UL (ref 140–440)
PMV BLD AUTO: 8.3 FL (ref 7–10)
POTASSIUM BLD-SCNC: 4.7 MMOL/L (ref 3.5–5)
PROT SERPL-MCNC: 6.8 G/DL (ref 6–8)
RBC # BLD AUTO: 4.88 MILL/UL (ref 3.8–5.4)
SODIUM SERPL-SCNC: 138 MMOL/L (ref 136–145)
TSH SERPL DL<=0.005 MIU/L-ACNC: 1.74 UIU/ML (ref 0.3–5)
WBC: 7.2 THOU/UL (ref 4–11)

## 2019-07-26 ENCOUNTER — COMMUNICATION - HEALTHEAST (OUTPATIENT)
Dept: FAMILY MEDICINE | Facility: CLINIC | Age: 34
End: 2019-07-26

## 2019-07-26 DIAGNOSIS — L70.9 ACNE, UNSPECIFIED ACNE TYPE: ICD-10-CM

## 2019-07-26 LAB — B BURGDOR IGG+IGM SER QL: <0.01 INDEX VALUE

## 2019-08-23 ENCOUNTER — COMMUNICATION - HEALTHEAST (OUTPATIENT)
Dept: FAMILY MEDICINE | Facility: CLINIC | Age: 34
End: 2019-08-23

## 2019-08-26 ENCOUNTER — ANCILLARY PROCEDURE (OUTPATIENT)
Dept: MRI IMAGING | Facility: CLINIC | Age: 34
End: 2019-08-26
Attending: ORTHOPAEDIC SURGERY
Payer: COMMERCIAL

## 2019-08-26 DIAGNOSIS — M17.12 PATELLOFEMORAL ARTHRITIS OF LEFT KNEE: ICD-10-CM

## 2019-09-10 ENCOUNTER — COMMUNICATION - HEALTHEAST (OUTPATIENT)
Dept: FAMILY MEDICINE | Facility: CLINIC | Age: 34
End: 2019-09-10

## 2019-09-21 ENCOUNTER — COMMUNICATION - HEALTHEAST (OUTPATIENT)
Dept: FAMILY MEDICINE | Facility: CLINIC | Age: 34
End: 2019-09-21

## 2019-10-25 ENCOUNTER — COMMUNICATION - HEALTHEAST (OUTPATIENT)
Dept: FAMILY MEDICINE | Facility: CLINIC | Age: 34
End: 2019-10-25

## 2019-10-25 DIAGNOSIS — F41.1 ANXIETY STATE: ICD-10-CM

## 2019-10-28 ENCOUNTER — COMMUNICATION - HEALTHEAST (OUTPATIENT)
Dept: FAMILY MEDICINE | Facility: CLINIC | Age: 34
End: 2019-10-28

## 2019-11-06 ENCOUNTER — HEALTH MAINTENANCE LETTER (OUTPATIENT)
Age: 34
End: 2019-11-06

## 2019-12-09 ENCOUNTER — COMMUNICATION - HEALTHEAST (OUTPATIENT)
Dept: FAMILY MEDICINE | Facility: CLINIC | Age: 34
End: 2019-12-09

## 2019-12-13 ENCOUNTER — COMMUNICATION - HEALTHEAST (OUTPATIENT)
Dept: SCHEDULING | Facility: CLINIC | Age: 34
End: 2019-12-13

## 2019-12-14 ENCOUNTER — COMMUNICATION - HEALTHEAST (OUTPATIENT)
Dept: FAMILY MEDICINE | Facility: CLINIC | Age: 34
End: 2019-12-14

## 2019-12-18 ENCOUNTER — OFFICE VISIT - HEALTHEAST (OUTPATIENT)
Dept: FAMILY MEDICINE | Facility: CLINIC | Age: 34
End: 2019-12-18

## 2019-12-18 ENCOUNTER — RECORDS - HEALTHEAST (OUTPATIENT)
Dept: ADMINISTRATIVE | Facility: OTHER | Age: 34
End: 2019-12-18

## 2019-12-18 DIAGNOSIS — R63.2 BINGE EATING: ICD-10-CM

## 2019-12-18 DIAGNOSIS — R03.0 ELEVATED BLOOD PRESSURE READING WITHOUT DIAGNOSIS OF HYPERTENSION: ICD-10-CM

## 2019-12-18 DIAGNOSIS — F41.1 ANXIETY STATE: ICD-10-CM

## 2019-12-19 ENCOUNTER — COMMUNICATION - HEALTHEAST (OUTPATIENT)
Dept: FAMILY MEDICINE | Facility: CLINIC | Age: 34
End: 2019-12-19

## 2020-01-09 ENCOUNTER — COMMUNICATION - HEALTHEAST (OUTPATIENT)
Dept: FAMILY MEDICINE | Facility: CLINIC | Age: 35
End: 2020-01-09

## 2020-01-17 ENCOUNTER — COMMUNICATION - HEALTHEAST (OUTPATIENT)
Dept: FAMILY MEDICINE | Facility: CLINIC | Age: 35
End: 2020-01-17

## 2020-01-17 DIAGNOSIS — F41.1 ANXIETY STATE: ICD-10-CM

## 2020-02-22 ENCOUNTER — RECORDS - HEALTHEAST (OUTPATIENT)
Dept: ADMINISTRATIVE | Facility: OTHER | Age: 35
End: 2020-02-22

## 2020-02-25 ENCOUNTER — COMMUNICATION - HEALTHEAST (OUTPATIENT)
Dept: FAMILY MEDICINE | Facility: CLINIC | Age: 35
End: 2020-02-25

## 2020-02-25 DIAGNOSIS — N39.3 STRESS INCONTINENCE OF URINE: ICD-10-CM

## 2020-03-01 ENCOUNTER — COMMUNICATION - HEALTHEAST (OUTPATIENT)
Dept: SCHEDULING | Facility: CLINIC | Age: 35
End: 2020-03-01

## 2020-03-04 ENCOUNTER — OFFICE VISIT - HEALTHEAST (OUTPATIENT)
Dept: CARDIOLOGY | Facility: CLINIC | Age: 35
End: 2020-03-04

## 2020-03-04 DIAGNOSIS — E66.01 MORBID OBESITY (H): ICD-10-CM

## 2020-03-04 DIAGNOSIS — G47.33 OBSTRUCTIVE SLEEP APNEA (ADULT) (PEDIATRIC): ICD-10-CM

## 2020-03-04 DIAGNOSIS — Z82.49 FAMILY HISTORY OF CARDIOMYOPATHY: ICD-10-CM

## 2020-03-04 DIAGNOSIS — I10 BENIGN ESSENTIAL HYPERTENSION: ICD-10-CM

## 2020-03-04 ASSESSMENT — MIFFLIN-ST. JEOR: SCORE: 1686.52

## 2020-03-12 ENCOUNTER — COMMUNICATION - HEALTHEAST (OUTPATIENT)
Dept: FAMILY MEDICINE | Facility: CLINIC | Age: 35
End: 2020-03-12

## 2020-03-12 DIAGNOSIS — Z30.40 ENCOUNTER FOR SURVEILLANCE OF CONTRACEPTIVES, UNSPECIFIED CONTRACEPTIVE: ICD-10-CM

## 2020-03-20 ENCOUNTER — COMMUNICATION - HEALTHEAST (OUTPATIENT)
Dept: FAMILY MEDICINE | Facility: CLINIC | Age: 35
End: 2020-03-20

## 2020-03-20 DIAGNOSIS — R07.9 CHEST PAIN, UNSPECIFIED TYPE: ICD-10-CM

## 2020-03-20 DIAGNOSIS — R07.89 ATYPICAL CHEST PAIN: ICD-10-CM

## 2020-03-29 ENCOUNTER — COMMUNICATION - HEALTHEAST (OUTPATIENT)
Dept: FAMILY MEDICINE | Facility: CLINIC | Age: 35
End: 2020-03-29

## 2020-03-30 ENCOUNTER — COMMUNICATION - HEALTHEAST (OUTPATIENT)
Dept: FAMILY MEDICINE | Facility: CLINIC | Age: 35
End: 2020-03-30

## 2020-03-30 ENCOUNTER — OFFICE VISIT - HEALTHEAST (OUTPATIENT)
Dept: FAMILY MEDICINE | Facility: CLINIC | Age: 35
End: 2020-03-30

## 2020-03-30 DIAGNOSIS — R03.0 ELEVATED BLOOD PRESSURE READING WITHOUT DIAGNOSIS OF HYPERTENSION: ICD-10-CM

## 2020-03-30 DIAGNOSIS — K21.00 GASTROESOPHAGEAL REFLUX DISEASE WITH ESOPHAGITIS: ICD-10-CM

## 2020-03-30 DIAGNOSIS — R68.83 CHILLS: ICD-10-CM

## 2020-03-31 ENCOUNTER — AMBULATORY - HEALTHEAST (OUTPATIENT)
Dept: LAB | Facility: CLINIC | Age: 35
End: 2020-03-31

## 2020-03-31 DIAGNOSIS — R68.83 CHILLS: ICD-10-CM

## 2020-03-31 DIAGNOSIS — R03.0 ELEVATED BLOOD PRESSURE READING WITHOUT DIAGNOSIS OF HYPERTENSION: ICD-10-CM

## 2020-03-31 LAB
ALBUMIN SERPL-MCNC: 3.8 G/DL (ref 3.5–5)
ALP SERPL-CCNC: 70 U/L (ref 45–120)
ALT SERPL W P-5'-P-CCNC: 32 U/L (ref 0–45)
ANION GAP SERPL CALCULATED.3IONS-SCNC: 12 MMOL/L (ref 5–18)
AST SERPL W P-5'-P-CCNC: 24 U/L (ref 0–40)
BASOPHILS # BLD AUTO: 0 THOU/UL (ref 0–0.2)
BASOPHILS NFR BLD AUTO: 1 % (ref 0–2)
BILIRUB SERPL-MCNC: 0.4 MG/DL (ref 0–1)
BUN SERPL-MCNC: 12 MG/DL (ref 8–22)
CALCIUM SERPL-MCNC: 9.5 MG/DL (ref 8.5–10.5)
CHLORIDE BLD-SCNC: 98 MMOL/L (ref 98–107)
CO2 SERPL-SCNC: 31 MMOL/L (ref 22–31)
CREAT SERPL-MCNC: 0.75 MG/DL (ref 0.6–1.1)
EOSINOPHIL # BLD AUTO: 0.2 THOU/UL (ref 0–0.4)
EOSINOPHIL NFR BLD AUTO: 2 % (ref 0–6)
ERYTHROCYTE [DISTWIDTH] IN BLOOD BY AUTOMATED COUNT: 12.6 % (ref 11–14.5)
ERYTHROCYTE [SEDIMENTATION RATE] IN BLOOD BY WESTERGREN METHOD: 23 MM/HR (ref 0–20)
GFR SERPL CREATININE-BSD FRML MDRD: >60 ML/MIN/1.73M2
GLUCOSE BLD-MCNC: 76 MG/DL (ref 70–125)
HCT VFR BLD AUTO: 43.8 % (ref 35–47)
HGB BLD-MCNC: 14.8 G/DL (ref 12–16)
LYMPHOCYTES # BLD AUTO: 1.6 THOU/UL (ref 0.8–4.4)
LYMPHOCYTES NFR BLD AUTO: 26 % (ref 20–40)
MCH RBC QN AUTO: 29.7 PG (ref 27–34)
MCHC RBC AUTO-ENTMCNC: 33.8 G/DL (ref 32–36)
MCV RBC AUTO: 88 FL (ref 80–100)
MONOCYTES # BLD AUTO: 0.3 THOU/UL (ref 0–0.9)
MONOCYTES NFR BLD AUTO: 4 % (ref 2–10)
NEUTROPHILS # BLD AUTO: 4.1 THOU/UL (ref 2–7.7)
NEUTROPHILS NFR BLD AUTO: 67 % (ref 50–70)
PLATELET # BLD AUTO: 184 THOU/UL (ref 140–440)
PMV BLD AUTO: 8.7 FL (ref 7–10)
POTASSIUM BLD-SCNC: 4 MMOL/L (ref 3.5–5)
PROT SERPL-MCNC: 7.2 G/DL (ref 6–8)
RBC # BLD AUTO: 4.99 MILL/UL (ref 3.8–5.4)
SODIUM SERPL-SCNC: 141 MMOL/L (ref 136–145)
WBC: 6.1 THOU/UL (ref 4–11)

## 2020-04-06 ENCOUNTER — COMMUNICATION - HEALTHEAST (OUTPATIENT)
Dept: FAMILY MEDICINE | Facility: CLINIC | Age: 35
End: 2020-04-06

## 2020-04-06 ENCOUNTER — RECORDS - HEALTHEAST (OUTPATIENT)
Dept: ADMINISTRATIVE | Facility: OTHER | Age: 35
End: 2020-04-06

## 2020-04-06 DIAGNOSIS — I77.74 VERTEBRAL ARTERY DISSECTION (H): ICD-10-CM

## 2020-04-07 ENCOUNTER — COMMUNICATION - HEALTHEAST (OUTPATIENT)
Dept: FAMILY MEDICINE | Facility: CLINIC | Age: 35
End: 2020-04-07

## 2020-04-07 ENCOUNTER — RECORDS - HEALTHEAST (OUTPATIENT)
Dept: ADMINISTRATIVE | Facility: OTHER | Age: 35
End: 2020-04-07

## 2020-04-07 DIAGNOSIS — Z30.011 BCP (BIRTH CONTROL PILLS) INITIATION: ICD-10-CM

## 2020-04-14 ENCOUNTER — COMMUNICATION - HEALTHEAST (OUTPATIENT)
Dept: FAMILY MEDICINE | Facility: CLINIC | Age: 35
End: 2020-04-14

## 2020-04-14 ENCOUNTER — OFFICE VISIT - HEALTHEAST (OUTPATIENT)
Dept: FAMILY MEDICINE | Facility: CLINIC | Age: 35
End: 2020-04-14

## 2020-04-14 DIAGNOSIS — H10.33 ACUTE CONJUNCTIVITIS OF BOTH EYES, UNSPECIFIED ACUTE CONJUNCTIVITIS TYPE: ICD-10-CM

## 2020-04-20 ENCOUNTER — OFFICE VISIT - HEALTHEAST (OUTPATIENT)
Dept: FAMILY MEDICINE | Facility: CLINIC | Age: 35
End: 2020-04-20

## 2020-04-20 ENCOUNTER — COMMUNICATION - HEALTHEAST (OUTPATIENT)
Dept: FAMILY MEDICINE | Facility: CLINIC | Age: 35
End: 2020-04-20

## 2020-04-20 DIAGNOSIS — R07.9 CHEST PAIN, UNSPECIFIED TYPE: ICD-10-CM

## 2020-04-20 DIAGNOSIS — R61 NIGHT SWEATS: ICD-10-CM

## 2020-04-20 DIAGNOSIS — T14.8XXA BRUISING: ICD-10-CM

## 2020-04-20 DIAGNOSIS — R07.89 ATYPICAL CHEST PAIN: ICD-10-CM

## 2020-04-20 DIAGNOSIS — I10 ESSENTIAL HYPERTENSION: ICD-10-CM

## 2020-04-20 DIAGNOSIS — I77.74 VERTEBRAL ARTERY DISSECTION (H): ICD-10-CM

## 2020-04-24 ENCOUNTER — COMMUNICATION - HEALTHEAST (OUTPATIENT)
Dept: FAMILY MEDICINE | Facility: CLINIC | Age: 35
End: 2020-04-24

## 2020-04-24 DIAGNOSIS — K21.00 GASTROESOPHAGEAL REFLUX DISEASE WITH ESOPHAGITIS: ICD-10-CM

## 2020-04-27 ENCOUNTER — COMMUNICATION - HEALTHEAST (OUTPATIENT)
Dept: FAMILY MEDICINE | Facility: CLINIC | Age: 35
End: 2020-04-27

## 2020-05-04 ENCOUNTER — COMMUNICATION - HEALTHEAST (OUTPATIENT)
Dept: FAMILY MEDICINE | Facility: CLINIC | Age: 35
End: 2020-05-04

## 2020-05-04 DIAGNOSIS — I10 ESSENTIAL HYPERTENSION: ICD-10-CM

## 2020-05-04 DIAGNOSIS — R61 NIGHT SWEATS: ICD-10-CM

## 2020-05-08 ENCOUNTER — COMMUNICATION - HEALTHEAST (OUTPATIENT)
Dept: FAMILY MEDICINE | Facility: CLINIC | Age: 35
End: 2020-05-08

## 2020-05-08 DIAGNOSIS — I77.74 VERTEBRAL ARTERY DISSECTION (H): ICD-10-CM

## 2020-05-13 ENCOUNTER — AMBULATORY - HEALTHEAST (OUTPATIENT)
Dept: LAB | Facility: CLINIC | Age: 35
End: 2020-05-13

## 2020-05-13 ENCOUNTER — RECORDS - HEALTHEAST (OUTPATIENT)
Dept: ADMINISTRATIVE | Facility: OTHER | Age: 35
End: 2020-05-13

## 2020-05-13 DIAGNOSIS — R61 NIGHT SWEATS: ICD-10-CM

## 2020-05-13 LAB
ALBUMIN SERPL-MCNC: 4 G/DL (ref 3.5–5)
ALBUMIN UR-MCNC: NEGATIVE MG/DL
ALP SERPL-CCNC: 65 U/L (ref 45–120)
ALT SERPL W P-5'-P-CCNC: 76 U/L (ref 0–45)
ANION GAP SERPL CALCULATED.3IONS-SCNC: 16 MMOL/L (ref 5–18)
APPEARANCE UR: CLEAR
AST SERPL W P-5'-P-CCNC: 44 U/L (ref 0–40)
BILIRUB SERPL-MCNC: 0.2 MG/DL (ref 0–1)
BILIRUB UR QL STRIP: NEGATIVE
BUN SERPL-MCNC: 10 MG/DL (ref 8–22)
C REACTIVE PROTEIN LHE: 0.4 MG/DL (ref 0–0.8)
CALCIUM SERPL-MCNC: 9.8 MG/DL (ref 8.5–10.5)
CHLORIDE BLD-SCNC: 100 MMOL/L (ref 98–107)
CO2 SERPL-SCNC: 27 MMOL/L (ref 22–31)
COLOR UR AUTO: YELLOW
CREAT SERPL-MCNC: 0.82 MG/DL (ref 0.6–1.1)
FSH SERPL-ACNC: 6.6 MIU/ML
GFR SERPL CREATININE-BSD FRML MDRD: >60 ML/MIN/1.73M2
GLUCOSE BLD-MCNC: 88 MG/DL (ref 70–125)
GLUCOSE UR STRIP-MCNC: NEGATIVE MG/DL
HGB UR QL STRIP: ABNORMAL
HIV 1+2 AB+HIV1 P24 AG SERPL QL IA: NEGATIVE
KETONES UR STRIP-MCNC: NEGATIVE MG/DL
LEUKOCYTE ESTERASE UR QL STRIP: NEGATIVE
NITRATE UR QL: NEGATIVE
PH UR STRIP: 7 [PH] (ref 5–8)
POTASSIUM BLD-SCNC: 3.4 MMOL/L (ref 3.5–5)
PROT SERPL-MCNC: 7.1 G/DL (ref 6–8)
SODIUM SERPL-SCNC: 143 MMOL/L (ref 136–145)
SP GR UR STRIP: 1.02 (ref 1–1.03)
UROBILINOGEN UR STRIP-ACNC: ABNORMAL

## 2020-05-14 ENCOUNTER — AMBULATORY - HEALTHEAST (OUTPATIENT)
Dept: LAB | Facility: CLINIC | Age: 35
End: 2020-05-14

## 2020-05-14 DIAGNOSIS — R61 NIGHT SWEATS: ICD-10-CM

## 2020-05-14 LAB
BACTERIA SPEC CULT: NO GROWTH
BASOPHILS # BLD AUTO: 0.1 THOU/UL (ref 0–0.2)
BASOPHILS NFR BLD AUTO: 1 % (ref 0–2)
EOSINOPHIL # BLD AUTO: 0.2 THOU/UL (ref 0–0.4)
EOSINOPHIL NFR BLD AUTO: 2 % (ref 0–6)
ERYTHROCYTE [DISTWIDTH] IN BLOOD BY AUTOMATED COUNT: 12.6 % (ref 11–14.5)
HCT VFR BLD AUTO: 43.6 % (ref 35–47)
HGB BLD-MCNC: 14.7 G/DL (ref 12–16)
LYMPHOCYTES # BLD AUTO: 2.1 THOU/UL (ref 0.8–4.4)
LYMPHOCYTES NFR BLD AUTO: 28 % (ref 20–40)
MCH RBC QN AUTO: 30.1 PG (ref 27–34)
MCHC RBC AUTO-ENTMCNC: 33.8 G/DL (ref 32–36)
MCV RBC AUTO: 89 FL (ref 80–100)
MONOCYTES # BLD AUTO: 0.4 THOU/UL (ref 0–0.9)
MONOCYTES NFR BLD AUTO: 5 % (ref 2–10)
NEUTROPHILS # BLD AUTO: 4.8 THOU/UL (ref 2–7.7)
NEUTROPHILS NFR BLD AUTO: 64 % (ref 50–70)
PLATELET # BLD AUTO: 234 THOU/UL (ref 140–440)
PMV BLD AUTO: 8.3 FL (ref 7–10)
RBC # BLD AUTO: 4.9 MILL/UL (ref 3.8–5.4)
WBC: 7.5 THOU/UL (ref 4–11)

## 2020-05-15 ENCOUNTER — COMMUNICATION - HEALTHEAST (OUTPATIENT)
Dept: FAMILY MEDICINE | Facility: CLINIC | Age: 35
End: 2020-05-15

## 2020-05-15 LAB
A PHAGOCYTOPH IGG TITR SER IF: NORMAL {TITER}
A PHAGOCYTOPH IGM TITR SER IF: NORMAL {TITER}
B BURGDOR IGG+IGM SER QL: <0.01 INDEX VALUE
GAMMA INTERFERON BACKGROUND BLD IA-ACNC: 0.04 IU/ML
M TB IFN-G BLD-IMP: NEGATIVE
MITOGEN IGNF BCKGRD COR BLD-ACNC: -0.01 IU/ML
MITOGEN IGNF BCKGRD COR BLD-ACNC: -0.02 IU/ML
QTF INTERPRETATION: NORMAL
QTF MITOGEN - NIL: 6.6 IU/ML

## 2020-05-16 LAB
E CHAFFEENSIS IGG TITR SER IF: NORMAL {TITER}
E CHAFFEENSIS IGM TITR SER IF: NORMAL {TITER}

## 2020-05-17 LAB
B MICROTI DNA BLD QL NAA+PROBE: NOT DETECTED
BABESIA DNA BLD QL NAA+PROBE: NOT DETECTED

## 2020-05-18 ENCOUNTER — COMMUNICATION - HEALTHEAST (OUTPATIENT)
Dept: FAMILY MEDICINE | Facility: CLINIC | Age: 35
End: 2020-05-18

## 2020-05-18 LAB
AEROBIC BLOOD CULTURE BOTTLE: NO GROWTH
AEROBIC BLOOD CULTURE BOTTLE: NO GROWTH
ANAEROBIC BLOOD CULTURE BOTTLE: NO GROWTH
ANAEROBIC BLOOD CULTURE BOTTLE: NO GROWTH

## 2020-05-20 ENCOUNTER — OFFICE VISIT - HEALTHEAST (OUTPATIENT)
Dept: FAMILY MEDICINE | Facility: CLINIC | Age: 35
End: 2020-05-20

## 2020-05-20 DIAGNOSIS — R20.2 TINGLING IN EXTREMITIES: ICD-10-CM

## 2020-05-20 DIAGNOSIS — I77.74 VERTEBRAL ARTERY DISSECTION (H): ICD-10-CM

## 2020-05-20 ASSESSMENT — MIFFLIN-ST. JEOR: SCORE: 1681.98

## 2020-05-22 ENCOUNTER — COMMUNICATION - HEALTHEAST (OUTPATIENT)
Dept: FAMILY MEDICINE | Facility: CLINIC | Age: 35
End: 2020-05-22

## 2020-05-22 DIAGNOSIS — R20.2 TINGLING IN EXTREMITIES: ICD-10-CM

## 2020-05-28 ENCOUNTER — COMMUNICATION - HEALTHEAST (OUTPATIENT)
Dept: FAMILY MEDICINE | Facility: CLINIC | Age: 35
End: 2020-05-28

## 2020-05-28 DIAGNOSIS — I10 ESSENTIAL HYPERTENSION: ICD-10-CM

## 2020-06-08 ENCOUNTER — COMMUNICATION - HEALTHEAST (OUTPATIENT)
Dept: FAMILY MEDICINE | Facility: CLINIC | Age: 35
End: 2020-06-08

## 2020-06-09 ENCOUNTER — COMMUNICATION - HEALTHEAST (OUTPATIENT)
Dept: LAB | Facility: CLINIC | Age: 35
End: 2020-06-09

## 2020-06-09 DIAGNOSIS — R61 NIGHT SWEATS: ICD-10-CM

## 2020-06-10 ENCOUNTER — AMBULATORY - HEALTHEAST (OUTPATIENT)
Dept: LAB | Facility: CLINIC | Age: 35
End: 2020-06-10

## 2020-06-10 DIAGNOSIS — R61 NIGHT SWEATS: ICD-10-CM

## 2020-06-10 LAB
ALBUMIN SERPL-MCNC: 3.9 G/DL (ref 3.5–5)
ALP SERPL-CCNC: 62 U/L (ref 45–120)
ALT SERPL W P-5'-P-CCNC: 46 U/L (ref 0–45)
ANION GAP SERPL CALCULATED.3IONS-SCNC: 10 MMOL/L (ref 5–18)
AST SERPL W P-5'-P-CCNC: 31 U/L (ref 0–40)
BILIRUB SERPL-MCNC: 0.3 MG/DL (ref 0–1)
BUN SERPL-MCNC: 11 MG/DL (ref 8–22)
CALCIUM SERPL-MCNC: 10.3 MG/DL (ref 8.5–10.5)
CHLORIDE BLD-SCNC: 101 MMOL/L (ref 98–107)
CO2 SERPL-SCNC: 30 MMOL/L (ref 22–31)
CREAT SERPL-MCNC: 0.78 MG/DL (ref 0.6–1.1)
FSH SERPL-ACNC: 3.2 MIU/ML
GFR SERPL CREATININE-BSD FRML MDRD: >60 ML/MIN/1.73M2
GLUCOSE BLD-MCNC: 80 MG/DL (ref 70–125)
LH SERPL-ACNC: 3.8 MIU/ML
POTASSIUM BLD-SCNC: 3.6 MMOL/L (ref 3.5–5)
PROT SERPL-MCNC: 7 G/DL (ref 6–8)
SODIUM SERPL-SCNC: 141 MMOL/L (ref 136–145)

## 2020-06-11 ENCOUNTER — COMMUNICATION - HEALTHEAST (OUTPATIENT)
Dept: FAMILY MEDICINE | Facility: CLINIC | Age: 35
End: 2020-06-11

## 2020-06-14 ENCOUNTER — COMMUNICATION - HEALTHEAST (OUTPATIENT)
Dept: FAMILY MEDICINE | Facility: CLINIC | Age: 35
End: 2020-06-14

## 2020-06-17 ENCOUNTER — COMMUNICATION - HEALTHEAST (OUTPATIENT)
Dept: FAMILY MEDICINE | Facility: CLINIC | Age: 35
End: 2020-06-17

## 2020-06-18 ENCOUNTER — COMMUNICATION - HEALTHEAST (OUTPATIENT)
Dept: FAMILY MEDICINE | Facility: CLINIC | Age: 35
End: 2020-06-18

## 2020-06-18 DIAGNOSIS — R20.2 TINGLING IN EXTREMITIES: ICD-10-CM

## 2020-06-22 ENCOUNTER — COMMUNICATION - HEALTHEAST (OUTPATIENT)
Dept: PHYSICAL MEDICINE AND REHAB | Facility: CLINIC | Age: 35
End: 2020-06-22

## 2020-07-01 ENCOUNTER — HOSPITAL ENCOUNTER (OUTPATIENT)
Dept: PHYSICAL MEDICINE AND REHAB | Facility: CLINIC | Age: 35
Discharge: HOME | End: 2020-07-01
Attending: FAMILY MEDICINE

## 2020-07-01 DIAGNOSIS — R20.2 TINGLING IN EXTREMITIES: ICD-10-CM

## 2020-07-02 ENCOUNTER — COMMUNICATION - HEALTHEAST (OUTPATIENT)
Dept: FAMILY MEDICINE | Facility: CLINIC | Age: 35
End: 2020-07-02

## 2020-07-09 ENCOUNTER — RECORDS - HEALTHEAST (OUTPATIENT)
Dept: ADMINISTRATIVE | Facility: OTHER | Age: 35
End: 2020-07-09

## 2020-07-13 ENCOUNTER — COMMUNICATION - HEALTHEAST (OUTPATIENT)
Dept: FAMILY MEDICINE | Facility: CLINIC | Age: 35
End: 2020-07-13

## 2020-07-13 DIAGNOSIS — F41.1 ANXIETY STATE: ICD-10-CM

## 2020-07-23 ENCOUNTER — COMMUNICATION - HEALTHEAST (OUTPATIENT)
Dept: FAMILY MEDICINE | Facility: CLINIC | Age: 35
End: 2020-07-23

## 2020-07-23 DIAGNOSIS — R20.2 TINGLING IN EXTREMITIES: ICD-10-CM

## 2020-08-09 ENCOUNTER — COMMUNICATION - HEALTHEAST (OUTPATIENT)
Dept: FAMILY MEDICINE | Facility: CLINIC | Age: 35
End: 2020-08-09

## 2020-08-24 ENCOUNTER — RECORDS - HEALTHEAST (OUTPATIENT)
Dept: ADMINISTRATIVE | Facility: OTHER | Age: 35
End: 2020-08-24

## 2020-09-18 ENCOUNTER — COMMUNICATION - HEALTHEAST (OUTPATIENT)
Dept: FAMILY MEDICINE | Facility: CLINIC | Age: 35
End: 2020-09-18

## 2020-09-18 DIAGNOSIS — Z30.011 BCP (BIRTH CONTROL PILLS) INITIATION: ICD-10-CM

## 2020-09-19 ENCOUNTER — COMMUNICATION - HEALTHEAST (OUTPATIENT)
Dept: FAMILY MEDICINE | Facility: CLINIC | Age: 35
End: 2020-09-19

## 2020-10-09 ENCOUNTER — OFFICE VISIT - HEALTHEAST (OUTPATIENT)
Dept: FAMILY MEDICINE | Facility: CLINIC | Age: 35
End: 2020-10-09

## 2020-10-09 DIAGNOSIS — Z30.41 ENCOUNTER FOR SURVEILLANCE OF CONTRACEPTIVE PILLS: ICD-10-CM

## 2020-10-09 DIAGNOSIS — I10 ESSENTIAL HYPERTENSION: ICD-10-CM

## 2020-10-09 DIAGNOSIS — F41.1 ANXIETY STATE: ICD-10-CM

## 2020-10-09 ASSESSMENT — PATIENT HEALTH QUESTIONNAIRE - PHQ9: SUM OF ALL RESPONSES TO PHQ QUESTIONS 1-9: 22

## 2020-10-09 ASSESSMENT — ANXIETY QUESTIONNAIRES
4. TROUBLE RELAXING: NEARLY EVERY DAY
1. FEELING NERVOUS, ANXIOUS, OR ON EDGE: NEARLY EVERY DAY
6. BECOMING EASILY ANNOYED OR IRRITABLE: NEARLY EVERY DAY
5. BEING SO RESTLESS THAT IT IS HARD TO SIT STILL: NEARLY EVERY DAY
7. FEELING AFRAID AS IF SOMETHING AWFUL MIGHT HAPPEN: SEVERAL DAYS
GAD7 TOTAL SCORE: 19
IF YOU CHECKED OFF ANY PROBLEMS ON THIS QUESTIONNAIRE, HOW DIFFICULT HAVE THESE PROBLEMS MADE IT FOR YOU TO DO YOUR WORK, TAKE CARE OF THINGS AT HOME, OR GET ALONG WITH OTHER PEOPLE: VERY DIFFICULT
3. WORRYING TOO MUCH ABOUT DIFFERENT THINGS: NEARLY EVERY DAY
2. NOT BEING ABLE TO STOP OR CONTROL WORRYING: NEARLY EVERY DAY

## 2020-10-12 ENCOUNTER — COMMUNICATION - HEALTHEAST (OUTPATIENT)
Dept: FAMILY MEDICINE | Facility: CLINIC | Age: 35
End: 2020-10-12

## 2020-10-12 DIAGNOSIS — F41.1 ANXIETY STATE: ICD-10-CM

## 2020-11-06 ENCOUNTER — OFFICE VISIT - HEALTHEAST (OUTPATIENT)
Dept: FAMILY MEDICINE | Facility: CLINIC | Age: 35
End: 2020-11-06

## 2020-11-06 DIAGNOSIS — I10 ESSENTIAL HYPERTENSION: ICD-10-CM

## 2020-11-06 DIAGNOSIS — F41.1 ANXIETY STATE: ICD-10-CM

## 2020-11-06 ASSESSMENT — ANXIETY QUESTIONNAIRES
7. FEELING AFRAID AS IF SOMETHING AWFUL MIGHT HAPPEN: NEARLY EVERY DAY
2. NOT BEING ABLE TO STOP OR CONTROL WORRYING: NEARLY EVERY DAY
GAD7 TOTAL SCORE: 21
4. TROUBLE RELAXING: NEARLY EVERY DAY
1. FEELING NERVOUS, ANXIOUS, OR ON EDGE: NEARLY EVERY DAY
IF YOU CHECKED OFF ANY PROBLEMS ON THIS QUESTIONNAIRE, HOW DIFFICULT HAVE THESE PROBLEMS MADE IT FOR YOU TO DO YOUR WORK, TAKE CARE OF THINGS AT HOME, OR GET ALONG WITH OTHER PEOPLE: VERY DIFFICULT
6. BECOMING EASILY ANNOYED OR IRRITABLE: NEARLY EVERY DAY
3. WORRYING TOO MUCH ABOUT DIFFERENT THINGS: NEARLY EVERY DAY
5. BEING SO RESTLESS THAT IT IS HARD TO SIT STILL: NEARLY EVERY DAY

## 2020-11-06 ASSESSMENT — PATIENT HEALTH QUESTIONNAIRE - PHQ9: SUM OF ALL RESPONSES TO PHQ QUESTIONS 1-9: 21

## 2020-11-20 ENCOUNTER — RECORDS - HEALTHEAST (OUTPATIENT)
Dept: ADMINISTRATIVE | Facility: OTHER | Age: 35
End: 2020-11-20

## 2020-11-29 ENCOUNTER — HEALTH MAINTENANCE LETTER (OUTPATIENT)
Age: 35
End: 2020-11-29

## 2020-11-30 ENCOUNTER — COMMUNICATION - HEALTHEAST (OUTPATIENT)
Dept: FAMILY MEDICINE | Facility: CLINIC | Age: 35
End: 2020-11-30

## 2020-11-30 DIAGNOSIS — R63.2 BINGE EATING: ICD-10-CM

## 2020-12-04 ENCOUNTER — RECORDS - HEALTHEAST (OUTPATIENT)
Dept: ADMINISTRATIVE | Facility: OTHER | Age: 35
End: 2020-12-04

## 2021-01-05 ENCOUNTER — COMMUNICATION - HEALTHEAST (OUTPATIENT)
Dept: FAMILY MEDICINE | Facility: CLINIC | Age: 36
End: 2021-01-05

## 2021-01-05 DIAGNOSIS — R07.9 CHEST PAIN, UNSPECIFIED TYPE: ICD-10-CM

## 2021-01-05 DIAGNOSIS — R07.89 ATYPICAL CHEST PAIN: ICD-10-CM

## 2021-01-19 ENCOUNTER — COMMUNICATION - HEALTHEAST (OUTPATIENT)
Dept: FAMILY MEDICINE | Facility: CLINIC | Age: 36
End: 2021-01-19

## 2021-01-24 ENCOUNTER — COMMUNICATION - HEALTHEAST (OUTPATIENT)
Dept: FAMILY MEDICINE | Facility: CLINIC | Age: 36
End: 2021-01-24

## 2021-01-27 ENCOUNTER — OFFICE VISIT - HEALTHEAST (OUTPATIENT)
Dept: FAMILY MEDICINE | Facility: CLINIC | Age: 36
End: 2021-01-27

## 2021-01-27 DIAGNOSIS — S91.301A NON-HEALING OPEN WOUND OF RIGHT HEEL: ICD-10-CM

## 2021-01-27 DIAGNOSIS — R42 DIZZINESS: ICD-10-CM

## 2021-01-27 DIAGNOSIS — I10 ESSENTIAL HYPERTENSION: ICD-10-CM

## 2021-01-27 ASSESSMENT — MIFFLIN-ST. JEOR: SCORE: 1745.49

## 2021-02-04 ENCOUNTER — COMMUNICATION - HEALTHEAST (OUTPATIENT)
Dept: FAMILY MEDICINE | Facility: CLINIC | Age: 36
End: 2021-02-04

## 2021-02-24 ENCOUNTER — OFFICE VISIT - HEALTHEAST (OUTPATIENT)
Dept: SURGERY | Facility: CLINIC | Age: 36
End: 2021-02-24

## 2021-02-24 DIAGNOSIS — E66.01 MORBID OBESITY (H): ICD-10-CM

## 2021-02-24 DIAGNOSIS — G47.33 OSA (OBSTRUCTIVE SLEEP APNEA): ICD-10-CM

## 2021-02-24 DIAGNOSIS — I10 HYPERTENSION, UNSPECIFIED TYPE: ICD-10-CM

## 2021-02-24 DIAGNOSIS — F50.814 BINGE-EATING DISORDER, IN PARTIAL REMISSION, MILD: ICD-10-CM

## 2021-02-28 ENCOUNTER — COMMUNICATION - HEALTHEAST (OUTPATIENT)
Dept: SURGERY | Facility: CLINIC | Age: 36
End: 2021-02-28

## 2021-03-02 ENCOUNTER — COMMUNICATION - HEALTHEAST (OUTPATIENT)
Dept: SURGERY | Facility: CLINIC | Age: 36
End: 2021-03-02

## 2021-03-04 ENCOUNTER — AMBULATORY - HEALTHEAST (OUTPATIENT)
Dept: SURGERY | Facility: CLINIC | Age: 36
End: 2021-03-04

## 2021-03-04 ENCOUNTER — COMMUNICATION - HEALTHEAST (OUTPATIENT)
Dept: SURGERY | Facility: CLINIC | Age: 36
End: 2021-03-04

## 2021-03-04 ASSESSMENT — MIFFLIN-ST. JEOR: SCORE: 1742.08

## 2021-03-12 ENCOUNTER — RECORDS - HEALTHEAST (OUTPATIENT)
Dept: ADMINISTRATIVE | Facility: OTHER | Age: 36
End: 2021-03-12

## 2021-03-17 ENCOUNTER — COMMUNICATION - HEALTHEAST (OUTPATIENT)
Dept: FAMILY MEDICINE | Facility: CLINIC | Age: 36
End: 2021-03-17

## 2021-03-17 DIAGNOSIS — Z30.011 BCP (BIRTH CONTROL PILLS) INITIATION: ICD-10-CM

## 2021-03-26 ENCOUNTER — COMMUNICATION - HEALTHEAST (OUTPATIENT)
Dept: SURGERY | Facility: CLINIC | Age: 36
End: 2021-03-26

## 2021-03-30 ENCOUNTER — OFFICE VISIT - HEALTHEAST (OUTPATIENT)
Dept: SURGERY | Facility: CLINIC | Age: 36
End: 2021-03-30

## 2021-03-30 DIAGNOSIS — E66.9 OBESITY (BMI 30-39.9): ICD-10-CM

## 2021-04-04 ENCOUNTER — COMMUNICATION - HEALTHEAST (OUTPATIENT)
Dept: FAMILY MEDICINE | Facility: CLINIC | Age: 36
End: 2021-04-04

## 2021-04-04 DIAGNOSIS — R20.2 TINGLING IN EXTREMITIES: ICD-10-CM

## 2021-04-24 ENCOUNTER — COMMUNICATION - HEALTHEAST (OUTPATIENT)
Dept: FAMILY MEDICINE | Facility: CLINIC | Age: 36
End: 2021-04-24

## 2021-04-24 DIAGNOSIS — I10 ESSENTIAL HYPERTENSION: ICD-10-CM

## 2021-04-28 ENCOUNTER — OFFICE VISIT - HEALTHEAST (OUTPATIENT)
Dept: SURGERY | Facility: CLINIC | Age: 36
End: 2021-04-28

## 2021-04-28 DIAGNOSIS — G47.33 OBSTRUCTIVE SLEEP APNEA (ADULT) (PEDIATRIC): ICD-10-CM

## 2021-04-28 DIAGNOSIS — E66.811 OBESITY, CLASS I, BMI 30-34.9: ICD-10-CM

## 2021-04-28 DIAGNOSIS — I10 BENIGN ESSENTIAL HYPERTENSION: ICD-10-CM

## 2021-04-28 ASSESSMENT — MIFFLIN-ST. JEOR: SCORE: 1795.38

## 2021-05-03 ENCOUNTER — OFFICE VISIT - HEALTHEAST (OUTPATIENT)
Dept: SURGERY | Facility: CLINIC | Age: 36
End: 2021-05-03

## 2021-05-03 DIAGNOSIS — E66.9 OBESITY (BMI 30-39.9): ICD-10-CM

## 2021-05-03 ASSESSMENT — MIFFLIN-ST. JEOR: SCORE: 1795.38

## 2021-05-25 ENCOUNTER — RECORDS - HEALTHEAST (OUTPATIENT)
Dept: ADMINISTRATIVE | Facility: CLINIC | Age: 36
End: 2021-05-25

## 2021-05-26 ASSESSMENT — PATIENT HEALTH QUESTIONNAIRE - PHQ9: SUM OF ALL RESPONSES TO PHQ QUESTIONS 1-9: 22

## 2021-05-27 VITALS — SYSTOLIC BLOOD PRESSURE: 125 MMHG | HEART RATE: 104 BPM | DIASTOLIC BLOOD PRESSURE: 90 MMHG

## 2021-05-27 VITALS — WEIGHT: 233 LBS | HEIGHT: 68 IN | BODY MASS INDEX: 35.31 KG/M2

## 2021-05-27 ASSESSMENT — PATIENT HEALTH QUESTIONNAIRE - PHQ9: SUM OF ALL RESPONSES TO PHQ QUESTIONS 1-9: 21

## 2021-05-28 ASSESSMENT — ANXIETY QUESTIONNAIRES
GAD7 TOTAL SCORE: 21
GAD7 TOTAL SCORE: 19

## 2021-05-29 NOTE — PROGRESS NOTES
ASSESSMENT/PLAN:       1. Anxiety  -Patient's anxiety is significantly improved with the dose change.  She will continue on this dose of venlafaxine, and plan on following up here in 6 months if doing well and I will check in with her in approximately 2 months by my chart and if she is doing well at that time I can send in a 90-day supply for her.    2. Varicose veins of right lower extremity with pain  -Does have compression stockings at home, does know she should wear these for 3 months prior to any intervention.  She will start doing this and I did place a referral for vascular surgery as well.  She will call if there are any other concerns or questions  - Ambulatory referral to Vascular Medicine      Return in about 6 months (around 12/21/2019) for recheck.         Isabel Jefferson MD      PROGRESS NOTE   6/21/2019    SUBJECTIVE:  Diane Costa is a 34 y.o. female  who presents for follow up.     She is here today for a follow up. She is feeling better with the venlafaxine. She does find that she is not able to sleep if she takes this too late.     She has not done therapy yet, is not a great talking.     She does have pain daily in her RLE due to her varicose vein. Is wearing her compression stocking. She is following up with her orthopedist in August and does have an MRI pending. She sees Dr. Pan at [facility] for this. Did have a surgery 10 years ago and is going to likely need a replacement. Her tibia grew at the wrong angle to support her knee. Started in the 8th grade.       Chief Complaint   Patient presents with     Anxiety     Two week follow up.          Patient Active Problem List   Diagnosis     Dyshidrosis     Irritable Bowel Syndrome     Vitamin D Deficiency     Anxiety     Essential Hypertriglyceridemia     Obstructive Sleep Apnea     Obesity (BMI 35.0-39.9) with comorbidity (H)       Current Outpatient Medications   Medication Sig Dispense Refill     cholecalciferol, vitamin D3,  (VITAMIN D3) 2,000 unit cap Take 1 tablet by mouth daily.       norgestimate-ethinyl estradiol (ORTHO TRI-CYCLEN LO, 28,) 0.18/0.215/0.25 mg-25 mcg Tab tablet Take 1 tablet by mouth daily. 84 tablet 3     venlafaxine (EFFEXOR XR) 75 MG 24 hr capsule Take 1 capsule (75 mg total) by mouth daily. 30 capsule 2     No current facility-administered medications for this visit.        Social History     Tobacco Use   Smoking Status Never Smoker   Smokeless Tobacco Never Used           OBJECTIVE:        No results found for this or any previous visit (from the past 240 hour(s)).    Vitals:    06/21/19 1259   BP: 116/76   Patient Site: Left Arm   Patient Position: Sitting   Cuff Size: Adult Large   Pulse: (!) 104   SpO2: 98%   Weight: (!) 230 lb 3.2 oz (104.4 kg)     Weight: (!) 230 lb 3.2 oz (104.4 kg)          Physical Exam:  GENERAL APPEARANCE: A&A, NAD, well hydrated, well nourished  SKIN:  Normal skin turgor, no lesions/rashes   HEENT: moist mucous membranes, no rhinorrhea  Psych: Her affect is bright, she is casually dressed and groomed, her thought process and speech pattern are normal, no obvious hallucinations  EXTREMITY: no edema, right lower extremity with varicosities on the medial aspect of the leg, no skin changes at this time  NEURO: no gross deficits

## 2021-05-29 NOTE — PROGRESS NOTES
ASSESSMENT/PLAN:       1. Anxiety  -Persistent and worsening symptoms even on full dose of Lexapro. As she has been on 3 different SSRIs without great results will have her trial venlafaxine as listed below. She will cross taper, go on 10 mg of Lexapro for one week then stop.   -At that point, if improving we can consider starting Vyvanse. In the meantime, she will explore if therapy is covered better than the [facility]. Can consider psychiatry referral as well.   - venlafaxine (EFFEXOR XR) 37.5 MG 24 hr capsule; Take 1 capsule (37.5 mg total) by mouth daily for 7 days, THEN 2 capsules (75 mg total) daily.  Dispense: 30 capsule; Refill: 2      Return for Next scheduled follow up.        Isabel Jefferson MD      PROGRESS NOTE   6/4/2019    SUBJECTIVE:  Diane Costa is a 34 y.o. female  who presents for follow up.     In the last several weeks to a month she has noted that she is becoming more anxious and depressed. She is still able to take care of her children, but she is spending the majority of her time otherwise curled up in bed with little interest in doing anything else. She has been on several medications in the past.     She has been seen at the [facility] in the past, but that was unaffordable, this was for her binge eating disorder. She has not explored if therapy is better covered than the [facility]. She has not been seen by psychiatry in the past.   Chief Complaint   Patient presents with     Medication Management     Patient is here today to follow up on her current medications, she wonders about taking an alternative to Lexapro.      Eating Disorder     Patient would like to discuss her binge eating.          Patient Active Problem List   Diagnosis     Dyshidrosis     Irritable Bowel Syndrome     Vitamin D Deficiency     Anxiety     Essential Hypertriglyceridemia     Obstructive Sleep Apnea     Obesity (BMI 35.0-39.9) with comorbidity (H)       Current Outpatient Medications    Medication Sig Dispense Refill     cholecalciferol, vitamin D3, (VITAMIN D3) 2,000 unit cap Take 1 tablet by mouth daily.       escitalopram oxalate (LEXAPRO) 20 MG tablet Take 1 tablet (20 mg total) by mouth daily. 90 tablet 3     norgestimate-ethinyl estradiol (ORTHO TRI-CYCLEN LO, 28,) 0.18/0.215/0.25 mg-25 mcg Tab tablet Take 1 tablet by mouth daily. 84 tablet 3     norethindrone (GIL) 0.35 mg tablet Take 1 tablet (0.35 mg total) by mouth daily. 84 tablet 3     venlafaxine (EFFEXOR XR) 37.5 MG 24 hr capsule Take 1 capsule (37.5 mg total) by mouth daily for 7 days, THEN 2 capsules (75 mg total) daily. 30 capsule 2     No current facility-administered medications for this visit.        Social History     Tobacco Use   Smoking Status Never Smoker   Smokeless Tobacco Never Used           OBJECTIVE:        No results found for this or any previous visit (from the past 240 hour(s)).    Vitals:    06/04/19 1550   BP: 120/78   Patient Site: Right Arm   Patient Position: Sitting   Cuff Size: Adult Large   Pulse: 72   SpO2: 98%   Weight: (!) 235 lb 11.2 oz (106.9 kg)     Weight: (!) 235 lb 11.2 oz (106.9 kg)          Physical Exam:  GENERAL APPEARANCE: A&A, NAD, well hydrated, well nourished  SKIN:  Normal skin turgor, no lesions/rashes   HEENT: moist mucous membranes, no rhinorrhea  PSYCH: well dressed, groomed, normal eye contact and thought process, not tearful today   EXTREMITY: no edema   NEURO: no gross deficits

## 2021-05-29 NOTE — TELEPHONE ENCOUNTER
Refill Approved    Rx renewed per Medication Renewal Policy. Medication was last renewed on 3/7/19  OV 4/12/19.    Roseann Castillo, [facility] Connection Triage/Med Refill 5/25/2019     Requested Prescriptions   Pending Prescriptions Disp Refills     escitalopram oxalate (LEXAPRO) 20 MG tablet [Pharmacy Med Name: ESCITALOPRAM 20 MG TABLET] 90 tablet 0     Sig: TAKE 1 TABLET BY MOUTH EVERY DAY       [facility] Refill Protocol  Passed - 5/25/2019  9:09 AM        Passed - PCP or prescribing provider visit in last year     Last office visit with prescriber/PCP: 8/1/2017 Isabel Jefferson MD OR same dept: Visit date not found OR same specialty: 2/8/2018 Petey Campos MD  Last physical: 4/12/2019 Last MTM visit: Visit date not found   Next visit within 3 mo: Visit date not found  Next physical within 3 mo: Visit date not found  Prescriber OR PCP: Isabel Jefferson MD  Last diagnosis associated with med order: There are no diagnoses linked to this encounter.  If protocol passes may refill for 12 months if within 3 months of last provider visit (or a total of 15 months).

## 2021-05-29 NOTE — TELEPHONE ENCOUNTER
Medication Question or Clarification  Who is calling: Pharmacy: [facility]  What medication are you calling about? (include dose and sig)   venlafaxine (EFFEXOR XR) 37.5 MG 24 hr capsule 30 capsule 2 6/4/2019 7/11/2019    Sig - Route: Take 1 capsule (37.5 mg total) by mouth daily for 7 days, THEN 2 capsules (75 mg total) daily. - Oral    Sent to pharmacy as: venlafaxine (EFFEXOR XR) 37.5 MG 24 hr capsule    E-Prescribing Status: Receipt confirmed by pharmacy (6/4/2019  4:13 PM CDT)        Who prescribed the medication?: Isabel Jefferson MD  What is your question/concern?: Pharmacy states patient's insurance will not cover two tablets daily. Questioning if provider can write two separate scripts. Requesting one prescription for a seven day supply of the 37.5 mg capsules, and the other prescription for 75 mg capsules. Please reach out to the pharmacy and advise.  Pharmacy: [facility] [place]  Okay to leave a detailed message?: No  Site CMT - Please call the pharmacy to obtain any additional needed information.

## 2021-05-29 NOTE — PATIENT INSTRUCTIONS - HE
Cut your escitalopram in half for one week, then stop. Start the venlafaxine with the first half dose of escitalopram, and take as directed.

## 2021-05-30 VITALS — WEIGHT: 220.9 LBS | HEIGHT: 68 IN | BODY MASS INDEX: 33.48 KG/M2

## 2021-05-30 VITALS — HEIGHT: 68 IN | BODY MASS INDEX: 33.65 KG/M2 | WEIGHT: 222 LBS

## 2021-05-30 VITALS — WEIGHT: 223 LBS | BODY MASS INDEX: 34.41 KG/M2

## 2021-05-30 VITALS — HEIGHT: 68 IN | BODY MASS INDEX: 33.56 KG/M2 | WEIGHT: 221.4 LBS

## 2021-05-30 NOTE — TELEPHONE ENCOUNTER
RN cannot approve Refill Request    RN can NOT refill this medication overdue for office visits and/or labs.    Anshu Avalos, [facility] Triage/Med Refill 7/1/2019    Requested Prescriptions   Pending Prescriptions Disp Refills     venlafaxine (EFFEXOR-XR) 75 MG 24 hr capsule [Pharmacy Med Name: VENLAFAXINE HCL ER 75 MG CAP] 30 capsule 2     Sig: TAKE 1 CAPSULE BY MOUTH EVERY DAY       Venlafaxine/Desvenlafaxine Refill Protocol Failed - 7/1/2019  8:32 AM        Failed - LFT or AST or ALT in last year     Albumin   Date Value Ref Range Status   04/25/2017 3.4 (L) 3.5 - 5.0 g/dL Final     Bilirubin, Total   Date Value Ref Range Status   04/25/2017 0.3 0.0 - 1.0 mg/dL Final     Bilirubin, Direct   Date Value Ref Range Status   06/15/2012 <0.1 <0.6 mg/dL Final     Alkaline Phosphatase   Date Value Ref Range Status   04/25/2017 67 45 - 120 U/L Final     AST   Date Value Ref Range Status   03/29/2018 9 0 - 40 U/L Final     ALT   Date Value Ref Range Status   03/29/2018 <9 0 - 45 U/L Final     Protein, Total   Date Value Ref Range Status   04/25/2017 6.6 6.0 - 8.0 g/dL Final                Passed - Fasting lipid cascade in last year     Cholesterol   Date Value Ref Range Status   04/12/2019 151 <=199 mg/dL Final     Triglycerides   Date Value Ref Range Status   04/12/2019 106 <=149 mg/dL Final     HDL Cholesterol   Date Value Ref Range Status   04/12/2019 44 (L) >=50 mg/dL Final     LDL Calculated   Date Value Ref Range Status   04/12/2019 86 <=129 mg/dL Final     Patient Fasting > 8hrs?   Date Value Ref Range Status   04/12/2019 Yes  Final   04/12/2019 Yes  Final             Passed - PCP or prescribing provider visit in last year     Last office visit with prescriber/PCP: 6/21/2019 Isabel Jefferson MD OR same dept: 6/21/2019 Isabel Jefferson MD OR same specialty: 6/21/2019 Isabel Jefferson MD  Last physical: 4/12/2019 Last MTM visit: Visit date not found   Next visit within 3 mo:  Visit date not found  Next physical within 3 mo: Visit date not found  Prescriber OR PCP: Isabel Jefferson MD  Last diagnosis associated with med order: 1. Anxiety  - venlafaxine (EFFEXOR-XR) 75 MG 24 hr capsule [Pharmacy Med Name: VENLAFAXINE HCL ER 75 MG CAP]; TAKE 1 CAPSULE BY MOUTH EVERY DAY  Dispense: 30 capsule; Refill: 2    If protocol passes may refill for 12 months if within 3 months of last provider visit (or a total of 15 months).             Passed - Blood Pressure in last year     BP Readings from Last 1 Encounters:   06/21/19 116/76

## 2021-05-30 NOTE — PROGRESS NOTES
[facility] Note                                                                                 Date of Visit: 7/25/2019     Chief Complaint   Diane Costa is a(n) 34 y.o. White or  female who presents to [facility] with the following complaint(s):  Numbness (Right Face , Right arm x today)       Assessment and Plan   1. Facial paresthesia  - HM1(CBC and Differential)  - Lyme Antibody [place]  - Comprehensive Metabolic Panel  - Thyroid Cascade  - Magnesium  - HM1 (CBC with Diff)      Reassured patient that her neurologic examination is normal. Etiology of right-sided facial paresthesias, which occur only when the right cheek is touched, is unclear. Discussed possible etiologies that could be developing, specifically herpes zoster, Bell's Palsy, and trigeminal neuralgia. Reviewed additional signs / symptoms of these conditions that would warrant urgent medical attention. Verified that patient has not missed any doses of her venlafaxine, as withdrawal from this medication can occur quickly and present as unusual neurologic symptoms. Checking laboratory studies as listed above to rule out anemia, electrolyte abnormalities, metabolic abnormalities, thyroid dysfunction, and Lyme Disease. No specific treatment is indicated at this time given that symptoms are intermittent and etiology is unclear.     Counseled patient regarding assessment and plan for evaluation and treatment. Questions were answered. See AVS for the specific written instructions and educational handout(s) regarding paresthesias, Bell's Palsy, shingles, and trigeminal neuralgia that were provided at the conclusion of the visit.     Discussed signs / symptoms that warrant urgent / emergent medical attention.     Follow up with Primary Care next week if symptoms persist. Return as needed in the interim.      History of Present Illness   Primary symptom: Tingling pain  Onset: Today  Location: Right cheek  Radiation: Up to the lower  eyelid and down to the upper lip  Progression: Persisting  Frequency: Intermittent, occurring whenever the area is touched, lasting from a few seconds up to 5 minutes.   Description: Pins and needles sensation.   Rating (0-10): 3  Exacerbating factors: Touching the area. Not triggered by chewing or talking.   Relieving factors: None  Associated swelling: No  Associated bruising: No  Associated erythema: No  Associated fevers: No  Associated chills: No  Additional symptoms: Has noted a dry mouth overnight for the last 3 nights. Right arm felt heavy for approximately a half hour while standing on her deck watching her kids play in the yard. No associated vision changes or headache. No known tick bites.   Home therapies utilized: None  Known injury: No  History of similar pain: No  History of surgery in this area: No  Tobacco use / exposure: No     Review of Systems   Review of Systems   All other systems reviewed and are negative.       Physical Exam   Vitals:    07/25/19 1712 07/25/19 1715   BP: (!) 133/94 128/90   Patient Site: Right Arm    Patient Position: Sitting    Pulse: 83    Resp: 17    Temp:  98.8  F (37.1  C)   TempSrc:  Oral   SpO2: 96%    Weight: (!) 227 lb (103 kg)      Physical Exam   Constitutional: She is oriented to person, place, and time. She appears well-developed.  Non-toxic appearance. She does not appear ill. No distress.   Overweight.    HENT:   Head: Normocephalic and atraumatic.   Right Ear: Tympanic membrane, external ear and ear canal normal.   Left Ear: Tympanic membrane, external ear and ear canal normal.   Nose: No mucosal edema or rhinorrhea. Right sinus exhibits no maxillary sinus tenderness and no frontal sinus tenderness. Left sinus exhibits no maxillary sinus tenderness and no frontal sinus tenderness.   Mouth/Throat: Uvula is midline, oropharynx is clear and moist and mucous membranes are normal. No oral lesions.   Eyes: Pupils are equal, round, and reactive to light. Conjunctivae  and EOM are normal.   Neck: Neck supple. No edema and no erythema present.   Cardiovascular: Normal rate, regular rhythm, S1 normal and S2 normal. Exam reveals no gallop and no friction rub.   No murmur heard.  Pulmonary/Chest: Effort normal and breath sounds normal. No stridor. She has no wheezes. She has no rhonchi. She has no rales.   Lymphadenopathy:        Head (right side): No submandibular, no preauricular and no posterior auricular adenopathy present.        Head (left side): No submandibular, no preauricular and no posterior auricular adenopathy present.     She has no cervical adenopathy.   Neurological: She is alert and oriented to person, place, and time. She has normal strength. No cranial nerve deficit or sensory deficit. GCS eye subscore is 4. GCS verbal subscore is 5. GCS motor subscore is 6.   Reflex Scores:       Bicep reflexes are 2+ on the right side and 2+ on the left side.       Brachioradialis reflexes are 2+ on the right side and 2+ on the left side.       Patellar reflexes are 2+ on the right side and 2+ on the left side.       Achilles reflexes are 1+ on the right side and 1+ on the left side.  Cranial nerves are intact. Patient reports paresthesias over the right maxillary area but not pain with palpation of this area.    Skin: Skin is warm and dry. No rash noted. She is not diaphoretic. No pallor.   Moderate inflammatory acne lesions on the face. No vesicular rash on the face.    Nursing note and vitals reviewed.       Diagnostic Studies   Laboratory:  N/A  Radiology:  N/A  Electrocardiogram:  N/A     Procedure Note   N/A     Pertinent History   The following portions of the patient's history were reviewed and updated as appropriate: allergies, current medications, past family history, past medical history, past social history, past surgical history and problem list.     Octavio Candelaria MD  [facility]

## 2021-05-31 VITALS — WEIGHT: 228.9 LBS | BODY MASS INDEX: 35.32 KG/M2

## 2021-05-31 VITALS — WEIGHT: 217.1 LBS | BODY MASS INDEX: 33.01 KG/M2

## 2021-05-31 VITALS — WEIGHT: 218.19 LBS | BODY MASS INDEX: 33.18 KG/M2

## 2021-05-31 VITALS — BODY MASS INDEX: 33.62 KG/M2 | WEIGHT: 221.1 LBS

## 2021-05-31 VITALS — BODY MASS INDEX: 35.83 KG/M2 | WEIGHT: 232.2 LBS

## 2021-05-31 VITALS — BODY MASS INDEX: 32.87 KG/M2 | WEIGHT: 216.2 LBS

## 2021-06-01 ENCOUNTER — RECORDS - HEALTHEAST (OUTPATIENT)
Dept: ADMINISTRATIVE | Facility: CLINIC | Age: 36
End: 2021-06-01

## 2021-06-01 VITALS — BODY MASS INDEX: 34.06 KG/M2 | WEIGHT: 224 LBS

## 2021-06-01 VITALS — WEIGHT: 224 LBS | BODY MASS INDEX: 34.06 KG/M2

## 2021-06-01 VITALS — WEIGHT: 226.7 LBS | BODY MASS INDEX: 34.47 KG/M2

## 2021-06-01 VITALS — BODY MASS INDEX: 34.24 KG/M2 | WEIGHT: 225.2 LBS

## 2021-06-01 VITALS — BODY MASS INDEX: 33.75 KG/M2 | WEIGHT: 222 LBS

## 2021-06-01 VITALS — WEIGHT: 228.1 LBS | BODY MASS INDEX: 34.68 KG/M2

## 2021-06-01 VITALS — WEIGHT: 224 LBS | HEIGHT: 68 IN | BODY MASS INDEX: 33.95 KG/M2

## 2021-06-01 VITALS — WEIGHT: 219 LBS | BODY MASS INDEX: 33.3 KG/M2

## 2021-06-01 VITALS — BODY MASS INDEX: 32.45 KG/M2 | WEIGHT: 213.4 LBS

## 2021-06-01 NOTE — TELEPHONE ENCOUNTER
Please call patient, I got my reminder to check in on her to see how the medication was going, and I didn't hear back.     Is she doing well, does she need to come in and see me?

## 2021-06-01 NOTE — TELEPHONE ENCOUNTER
Message left on identifiable voicemail. How is she doing? Dr Jefferson sent a [employer] message 9/10/19 to check in with her.   She was asked to call us back or read her [employer] and send back a message.

## 2021-06-02 VITALS — WEIGHT: 234 LBS | HEIGHT: 68 IN | BODY MASS INDEX: 35.46 KG/M2

## 2021-06-02 VITALS — HEIGHT: 68 IN | BODY MASS INDEX: 35.46 KG/M2 | WEIGHT: 234 LBS

## 2021-06-02 NOTE — TELEPHONE ENCOUNTER
Refill Approved    Rx renewed per Medication Renewal Policy. Medication was last renewed on 7/1/19.    Lacy Kinney, [facility] Triage/Med Refill 10/25/2019     Requested Prescriptions   Pending Prescriptions Disp Refills     venlafaxine (EFFEXOR-XR) 75 MG 24 hr capsule 90 capsule 0     Sig: TAKE 1 CAPSULE BY MOUTH EVERY DAY       Venlafaxine/Desvenlafaxine Refill Protocol Passed - 10/25/2019  9:45 AM        Passed - LFT or AST or ALT in last year     Albumin   Date Value Ref Range Status   07/25/2019 3.8 3.5 - 5.0 g/dL Final     Bilirubin, Total   Date Value Ref Range Status   07/25/2019 0.2 0.0 - 1.0 mg/dL Final     Bilirubin, Direct   Date Value Ref Range Status   06/15/2012 <0.1 <0.6 mg/dL Final     Alkaline Phosphatase   Date Value Ref Range Status   07/25/2019 67 45 - 120 U/L Final     AST   Date Value Ref Range Status   07/25/2019 26 0 - 40 U/L Final     ALT   Date Value Ref Range Status   07/25/2019 36 0 - 45 U/L Final     Protein, Total   Date Value Ref Range Status   07/25/2019 6.8 6.0 - 8.0 g/dL Final                Passed - Fasting lipid cascade in last year     Cholesterol   Date Value Ref Range Status   04/12/2019 151 <=199 mg/dL Final     Triglycerides   Date Value Ref Range Status   04/12/2019 106 <=149 mg/dL Final     HDL Cholesterol   Date Value Ref Range Status   04/12/2019 44 (L) >=50 mg/dL Final     LDL Calculated   Date Value Ref Range Status   04/12/2019 86 <=129 mg/dL Final     Patient Fasting > 8hrs?   Date Value Ref Range Status   04/12/2019 Yes  Final   04/12/2019 Yes  Final             Passed - PCP or prescribing provider visit in last year     Last office visit with prescriber/PCP: 6/21/2019 Isabel Jefferson MD OR same dept: 6/21/2019 Isabel Jefferson MD OR same specialty: 6/21/2019 Isabel Jefferson MD  Last physical: 4/12/2019 Last MTM visit: Visit date not found   Next visit within 3 mo: Visit date not found  Next physical within 3 mo: Visit  date not found  Prescriber OR PCP: Isabel Jefferson MD  Last diagnosis associated with med order: 1. Anxiety  - venlafaxine (EFFEXOR-XR) 75 MG 24 hr capsule; Take by mouth daily.; Refill: 0    If protocol passes may refill for 12 months if within 3 months of last provider visit (or a total of 15 months).             Passed - Blood Pressure in last year     BP Readings from Last 1 Encounters:   07/25/19 128/90

## 2021-06-03 VITALS — WEIGHT: 227 LBS | BODY MASS INDEX: 35.03 KG/M2

## 2021-06-03 VITALS — WEIGHT: 230.2 LBS | BODY MASS INDEX: 35.52 KG/M2

## 2021-06-03 VITALS — BODY MASS INDEX: 36.37 KG/M2 | WEIGHT: 235.7 LBS

## 2021-06-04 ENCOUNTER — OFFICE VISIT - HEALTHEAST (OUTPATIENT)
Dept: FAMILY MEDICINE | Facility: CLINIC | Age: 36
End: 2021-06-04

## 2021-06-04 VITALS
WEIGHT: 209 LBS | HEART RATE: 96 BPM | RESPIRATION RATE: 16 BRPM | BODY MASS INDEX: 31.67 KG/M2 | HEIGHT: 68 IN | SYSTOLIC BLOOD PRESSURE: 124 MMHG | DIASTOLIC BLOOD PRESSURE: 100 MMHG

## 2021-06-04 VITALS
BODY MASS INDEX: 34.29 KG/M2 | HEART RATE: 86 BPM | DIASTOLIC BLOOD PRESSURE: 100 MMHG | OXYGEN SATURATION: 98 % | SYSTOLIC BLOOD PRESSURE: 140 MMHG | WEIGHT: 222.2 LBS

## 2021-06-04 VITALS
BODY MASS INDEX: 31.52 KG/M2 | WEIGHT: 208 LBS | HEART RATE: 98 BPM | OXYGEN SATURATION: 98 % | DIASTOLIC BLOOD PRESSURE: 82 MMHG | HEIGHT: 68 IN | SYSTOLIC BLOOD PRESSURE: 118 MMHG

## 2021-06-04 DIAGNOSIS — F32.0 MILD MAJOR DEPRESSION (H): ICD-10-CM

## 2021-06-04 DIAGNOSIS — R10.9 FLANK PAIN: ICD-10-CM

## 2021-06-04 DIAGNOSIS — I10 ESSENTIAL HYPERTENSION: ICD-10-CM

## 2021-06-04 DIAGNOSIS — R23.2 HOT FLASHES: ICD-10-CM

## 2021-06-04 LAB
ALBUMIN SERPL-MCNC: 4.1 G/DL (ref 3.5–5)
ALBUMIN UR-MCNC: ABNORMAL G/DL
ALP SERPL-CCNC: 61 U/L (ref 45–120)
ALT SERPL W P-5'-P-CCNC: 40 U/L (ref 0–45)
ANION GAP SERPL CALCULATED.3IONS-SCNC: 12 MMOL/L (ref 5–18)
APPEARANCE UR: CLEAR
AST SERPL W P-5'-P-CCNC: 25 U/L (ref 0–40)
BASOPHILS # BLD AUTO: 0 THOU/UL (ref 0–0.2)
BASOPHILS NFR BLD AUTO: 0 % (ref 0–2)
BILIRUB SERPL-MCNC: 0.4 MG/DL (ref 0–1)
BILIRUB UR QL STRIP: NEGATIVE
BUN SERPL-MCNC: 12 MG/DL (ref 8–22)
CALCIUM SERPL-MCNC: 9.4 MG/DL (ref 8.5–10.5)
CHLORIDE BLD-SCNC: 101 MMOL/L (ref 98–107)
CO2 SERPL-SCNC: 28 MMOL/L (ref 22–31)
COLOR UR AUTO: YELLOW
CREAT SERPL-MCNC: 0.8 MG/DL (ref 0.6–1.1)
EOSINOPHIL # BLD AUTO: 0.1 THOU/UL (ref 0–0.4)
EOSINOPHIL NFR BLD AUTO: 1 % (ref 0–6)
ERYTHROCYTE [DISTWIDTH] IN BLOOD BY AUTOMATED COUNT: 11.9 % (ref 11–14.5)
ERYTHROCYTE [SEDIMENTATION RATE] IN BLOOD BY WESTERGREN METHOD: 12 MM/HR (ref 0–20)
ESTRADIOL SERPL-MCNC: 130 PG/ML
FSH SERPL-ACNC: 5.8 MIU/ML
GFR SERPL CREATININE-BSD FRML MDRD: >60 ML/MIN/1.73M2
GLUCOSE BLD-MCNC: 80 MG/DL (ref 70–125)
GLUCOSE UR STRIP-MCNC: NEGATIVE MG/DL
HCT VFR BLD AUTO: 41.7 % (ref 35–47)
HGB BLD-MCNC: 14.1 G/DL (ref 12–16)
HGB UR QL STRIP: ABNORMAL
IMM GRANULOCYTES # BLD: 0 THOU/UL
IMM GRANULOCYTES NFR BLD: 0 %
KETONES UR STRIP-MCNC: NEGATIVE MG/DL
LEUKOCYTE ESTERASE UR QL STRIP: ABNORMAL
LH SERPL-ACNC: 4.5 MIU/ML
LYMPHOCYTES # BLD AUTO: 1.9 THOU/UL (ref 0.8–4.4)
LYMPHOCYTES NFR BLD AUTO: 25 % (ref 20–40)
MCH RBC QN AUTO: 29.9 PG (ref 27–34)
MCHC RBC AUTO-ENTMCNC: 33.8 G/DL (ref 32–36)
MCV RBC AUTO: 88 FL (ref 80–100)
MONOCYTES # BLD AUTO: 0.4 THOU/UL (ref 0–0.9)
MONOCYTES NFR BLD AUTO: 5 % (ref 2–10)
NEUTROPHILS # BLD AUTO: 5.3 THOU/UL (ref 2–7.7)
NEUTROPHILS NFR BLD AUTO: 69 % (ref 50–70)
NITRATE UR QL: NEGATIVE
PH UR STRIP: 6 [PH] (ref 5–8)
PLATELET # BLD AUTO: 221 THOU/UL (ref 140–440)
PMV BLD AUTO: 10.6 FL (ref 7–10)
POTASSIUM BLD-SCNC: 4.1 MMOL/L (ref 3.5–5)
PROGEST SERPL-MCNC: <0.1 NG/ML
PROT SERPL-MCNC: 6.9 G/DL (ref 6–8)
RBC # BLD AUTO: 4.72 MILL/UL (ref 3.8–5.4)
SODIUM SERPL-SCNC: 141 MMOL/L (ref 136–145)
SP GR UR STRIP: >=1.03 (ref 1–1.03)
TSH SERPL DL<=0.005 MIU/L-ACNC: 1.93 UIU/ML (ref 0.3–5)
UROBILINOGEN UR STRIP-ACNC: ABNORMAL
WBC: 7.7 THOU/UL (ref 4–11)

## 2021-06-04 NOTE — TELEPHONE ENCOUNTER
FYI - Status Update  Who is Calling: Patient  Update: Patient is calling to update Isabel Jefferson MD she had blood pressure readings of 137/98 and 142/88 without sx but noted her b/p was elevated in recent pregnancy as well.  Patient is going to log b/p and bring this to her appointment on 12/18/19 to discuss. Patient was also aware to call 911 if she develops sx of as of shortness of breath, chest pain or irregular heart rate or other sx of cardiovascular distress and patient agrees with plan.   Okay to leave a detailed message?:  Yes

## 2021-06-04 NOTE — TELEPHONE ENCOUNTER
Rn triage call from patient  She reports that she was seen in [facility] 5 days ago. She reports that her BP readings at that visit was 137/98 and checked again it was 142/88  Patient did schedule a follow up visit with her PCP 12/18/19  However she reports that she has been monitoring her BP at home with a wrist monitor.  Last night she reports that she wasn't feeling well and went to [facility] to check her BP at the machine there.   143/99 and 164/112  She reports that last night she also felt a left temple head ache that went to behind her left ear, described as sharp. Unsure of how long it lasted.  She report BP now of 155/112  She denies current head pain, vision trouble, one sided weakness, chest pain.  Patient states strong family history of stroke and cardiac issues.  Gave disposition to be seen in ED now.  Patient was agreeable and will go now.  Elissa Torres, RN  [facility] Triage Nurse  6:56 AM  12/13/2019      Reason for Disposition    [1] Systolic BP  >= 160 OR Diastolic >= 100 AND [2] cardiac or neurologic symptoms (e.g., chest pain, difficulty breathing, unsteady gait, blurred vision)    Protocols used: HIGH BLOOD PRESSURE-A-AH

## 2021-06-04 NOTE — PROGRESS NOTES
ASSESSMENT/PLAN:       1. Anxiety  -Increasing dose of venlafaxine from 75 mg orally daily to 150 mg orally daily.  Is strongly considering therapy as well as she hopes this would help with her mental health as well as her binge eating disorder.  She will look into this, and she will plan on following up here in the next 2 to 3 months for recheck on the dose of the venlafaxine.  - venlafaxine (EFFEXOR-XR) 150 MG 24 hr capsule; TAKE 1 CAPSULE BY MOUTH EVERY DAY  Dispense: 30 capsule; Refill: 2  - Ambulatory referral to Psychology    2. Elevated blood pressure reading without diagnosis of hypertension  -Blood pressure elevated today and has been in the past.  Recommended she watch her diet, work on exercise and follow-up here in the next 2 to 3 months.  If it continues to be elevated we will need to consider medication.    3. Binge eating  -Discussed the possibility of starting Vyvanse but given her elevated blood pressure like to get that under control first.  She is understanding of this.  - Ambulatory referral to Psychology      Return in about 3 months (around 3/18/2020).      Isabel Jefferson MD      PROGRESS NOTE   12/18/2019    SUBJECTIVE:  Diane Costa is a 34 y.o. female  who presents for follow up.     She is finding that her anxiety is quite out of control. She does take her medications as prescribed, but she is missing appointments and work due to her anxiety.  She denies any obvious side effects from the venlafaxine and seems to be tolerating it without difficulties.  She is considering therapy in the past but this is been difficult to work into her schedule.    She has noted that her blood pressure is up as well.  She was in the ER recently due to headaches and her blood pressure was elevated at that time.  She does have a cuff at home and it continues to be elevated there as well.  In the 140s over 90s generally.    She has not seen anyone about her binge eating either. She hasn't had  time.  She was doing a program initially but this was quite expensive out-of-pocket.  She does think some of this is related to her stress.  Chief Complaint   Patient presents with     Hypertension     Patient is here today to follow up on her blood perssure. Was seen at  ER on 12/13/19. Patient has been havng off and on headaches. No vision changes, no chest pain, no shortness of breath.          Patient Active Problem List   Diagnosis     Dyshidrosis     Irritable Bowel Syndrome     Vitamin D Deficiency     Anxiety     Essential Hypertriglyceridemia     Obstructive Sleep Apnea     Obesity (BMI 35.0-39.9) with comorbidity (H)       Current Outpatient Medications   Medication Sig Dispense Refill     cholecalciferol, vitamin D3, (VITAMIN D3) 2,000 unit cap Take 1 tablet by mouth daily.       norgestimate-ethinyl estradiol (ORTHO TRI-CYCLEN LO, 28,) 0.18/0.215/0.25 mg-25 mcg Tab tablet Take 1 tablet by mouth daily. 84 tablet 3     venlafaxine (EFFEXOR-XR) 150 MG 24 hr capsule TAKE 1 CAPSULE BY MOUTH EVERY DAY 30 capsule 2     No current facility-administered medications for this visit.        Social History     Tobacco Use   Smoking Status Never Smoker   Smokeless Tobacco Never Used           OBJECTIVE:        No results found for this or any previous visit (from the past 240 hour(s)).    Vitals:    12/18/19 1154 12/18/19 1222   BP: (!) 144/108 (!) 140/100   Patient Site: Left Arm Left Arm   Patient Position: Sitting Sitting   Cuff Size: Adult Large Adult Large   Pulse: 86    SpO2: 98%    Weight: (!) 222 lb 3.2 oz (100.8 kg)      Weight: (!) 222 lb 3.2 oz (100.8 kg)          Physical Exam:  GENERAL APPEARANCE: A&A, NAD, well hydrated, well nourished  SKIN:  Normal skin turgor, no lesions/rashes   HEENT: moist mucous membranes, no rhinorrhea  NECK: Normal  CV: RRR, no M/G/R   LUNGS: CTAB  EXTREMITY: no edema   NEURO: no gross deficits   Psych: Her affect is appropriate, she is casually dressed and groomed, her  thought process and speech pattern are normal, she is not tearful today

## 2021-06-05 ENCOUNTER — RECORDS - HEALTHEAST (OUTPATIENT)
Dept: FAMILY MEDICINE | Facility: CLINIC | Age: 36
End: 2021-06-05

## 2021-06-05 VITALS
OXYGEN SATURATION: 97 % | BODY MASS INDEX: 33.42 KG/M2 | WEIGHT: 219.8 LBS | DIASTOLIC BLOOD PRESSURE: 90 MMHG | SYSTOLIC BLOOD PRESSURE: 132 MMHG | HEART RATE: 108 BPM

## 2021-06-05 VITALS
OXYGEN SATURATION: 99 % | DIASTOLIC BLOOD PRESSURE: 88 MMHG | BODY MASS INDEX: 33.65 KG/M2 | WEIGHT: 222 LBS | HEART RATE: 84 BPM | SYSTOLIC BLOOD PRESSURE: 118 MMHG | HEIGHT: 68 IN

## 2021-06-05 VITALS — BODY MASS INDEX: 35.31 KG/M2 | WEIGHT: 233 LBS | HEIGHT: 68 IN

## 2021-06-05 VITALS — HEIGHT: 68 IN | WEIGHT: 223 LBS | BODY MASS INDEX: 33.8 KG/M2

## 2021-06-05 DIAGNOSIS — Z36.89 ENCOUNTER FOR FETAL ANATOMIC SURVEY: ICD-10-CM

## 2021-06-05 NOTE — TELEPHONE ENCOUNTER
Refill Approved    Rx renewed per Medication Renewal Policy. Medication was last renewed on 12/18/2019.    Last OV: 12/18/2019.    Yobany Medrano, [facility] Triage/Med Refill 1/19/2020     Requested Prescriptions   Pending Prescriptions Disp Refills     venlafaxine (EFFEXOR-XR) 150 MG 24 hr capsule [Pharmacy Med Name: VENLAFAXINE HCL  MG CAP] 30 capsule 2     Sig: TAKE 1 CAPSULE BY MOUTH EVERY DAY       Venlafaxine/Desvenlafaxine Refill Protocol Passed - 1/17/2020 11:35 AM        Passed - LFT or AST or ALT in last year     Albumin   Date Value Ref Range Status   07/25/2019 3.8 3.5 - 5.0 g/dL Final     Bilirubin, Total   Date Value Ref Range Status   07/25/2019 0.2 0.0 - 1.0 mg/dL Final     Bilirubin, Direct   Date Value Ref Range Status   06/15/2012 <0.1 <0.6 mg/dL Final     Alkaline Phosphatase   Date Value Ref Range Status   07/25/2019 67 45 - 120 U/L Final     AST   Date Value Ref Range Status   07/25/2019 26 0 - 40 U/L Final     ALT   Date Value Ref Range Status   07/25/2019 36 0 - 45 U/L Final     Protein, Total   Date Value Ref Range Status   07/25/2019 6.8 6.0 - 8.0 g/dL Final                Passed - Fasting lipid cascade in last year     Cholesterol   Date Value Ref Range Status   04/12/2019 151 <=199 mg/dL Final     Triglycerides   Date Value Ref Range Status   04/12/2019 106 <=149 mg/dL Final     HDL Cholesterol   Date Value Ref Range Status   04/12/2019 44 (L) >=50 mg/dL Final     LDL Calculated   Date Value Ref Range Status   04/12/2019 86 <=129 mg/dL Final     Patient Fasting > 8hrs?   Date Value Ref Range Status   04/12/2019 Yes  Final   04/12/2019 Yes  Final             Passed - PCP or prescribing provider visit in last year     Last office visit with prescriber/PCP: 12/18/2019 Isabel Jefferson MD OR same dept: 12/18/2019 Isabel Jefferson MD OR same specialty: 12/18/2019 Isabel Jefferson MD  Last physical: 4/12/2019 Last MTM visit: Visit date not found    Next visit within 3 mo: Visit date not found  Next physical within 3 mo: Visit date not found  Prescriber OR PCP: Isabel Jefferson MD  Last diagnosis associated with med order: 1. Anxiety  - venlafaxine (EFFEXOR-XR) 150 MG 24 hr capsule [Pharmacy Med Name: VENLAFAXINE HCL  MG CAP]; TAKE 1 CAPSULE BY MOUTH EVERY DAY  Dispense: 30 capsule; Refill: 2    If protocol passes may refill for 12 months if within 3 months of last provider visit (or a total of 15 months).             Passed - Blood Pressure in last year     BP Readings from Last 1 Encounters:   12/18/19 (!) 140/100

## 2021-06-06 NOTE — PATIENT INSTRUCTIONS - HE
- Mediterranean diet, no processed foods    - Exercise 150 minutes per week    - See PCP in 1 week    - Call me with your family info and we can see if we need to do anything

## 2021-06-06 NOTE — TELEPHONE ENCOUNTER
Refill Approved    Rx renewed per Medication Renewal Policy. Medication was last renewed on 4/12/19.    Stacey Santacruz, [facility] Triage/Med Refill 3/14/2020     Requested Prescriptions   Pending Prescriptions Disp Refills     TRI-LO-IVONE 0.18/0.215/0.25 mg-25 mcg tablet [Pharmacy Med Name: TRI-LO-IVONE TABLET] 84 tablet 3     Sig: TAKE 1 TABLET BY MOUTH EVERY DAY       Oral Contraceptives Protocol Passed - 3/12/2020  5:05 AM        Passed - Visit with PCP or prescribing provider visit in last 12 months      Last office visit with prescriber/PCP: 12/18/2019 Isabel Jefferson MD OR same dept: 12/18/2019 Isabel Jefferson MD OR same specialty: 12/18/2019 Isabel Jefferson MD  Last physical: 4/12/2019 Last MTM visit: Visit date not found   Next visit within 3 mo: Visit date not found  Next physical within 3 mo: Visit date not found  Prescriber OR PCP: Isabel Jefferson MD  Last diagnosis associated with med order: 1. Encounter for surveillance of contraceptives, unspecified contraceptive  - TRI-LO-IVONE 0.18/0.215/0.25 mg-25 mcg tablet [Pharmacy Med Name: TRI-LO-IVONE TABLET]; TAKE 1 TABLET BY MOUTH EVERY DAY  Dispense: 84 tablet; Refill: 3    If protocol passes may refill for 12 months if within 3 months of last provider visit (or a total of 15 months).

## 2021-06-07 ENCOUNTER — COMMUNICATION - HEALTHEAST (OUTPATIENT)
Dept: FAMILY MEDICINE | Facility: CLINIC | Age: 36
End: 2021-06-07

## 2021-06-07 LAB
BACTERIA SPEC CULT: ABNORMAL
BACTERIA SPEC CULT: ABNORMAL

## 2021-06-07 NOTE — PROGRESS NOTES
"Diane Costa is a 35 y.o. female who is being evaluated via a billable telephone visit.      The patient has been notified of following:     \"This telephone visit will be conducted via a call between you and your physician/provider. We have found that certain health care needs can be provided without the need for a physical exam.  This service lets us provide the care you need with a short phone conversation.  If a prescription is necessary we can send it directly to your pharmacy.  If lab work is needed we can place an order for that and you can then stop by our lab to have the test done at a later time.    Telephone visits are billed at different rates depending on your insurance coverage. During this emergency period, for some insurers they may be billed the same as an in-person visit.  Please reach out to your insurance provider with any questions.    If during the course of the call the physician/provider feels a telephone visit is not appropriate, you will not be charged for this service.\"    Patient has given verbal consent to a Telephone visit? Yes    Patient would like to receive their AVS by AVS Preference: Katya.    Additional provider notes:     Patient has been experiencing bilateral eye itching, exudate, and allergy symptoms.  She endorses rhinorrhea, itchy nose, itchy eyes, and eye redness.  Patient lives with young children but no one else has symptoms of conjunctivitis at this time.  Patient is concerned about how contagious this could be.    Assessment/Plan:  1. Acute conjunctivitis of both eyes, unspecified acute conjunctivitis type  Patient called in with symptoms of allergic conjunctivitis.  We discussed that this was the most likely diagnosis and that antibiotics would be unlikely to help.  I recommended lubricating drops and an antihistamine and patient was amenable.  However, if patient's symptoms do not improve with these measures in the next 24 hours I think that initiation on " topical antibiotics to prevent spread of contagion is very reasonable and call this in as a pocket prescription.  - ofloxacin (OCUFLOX) 0.3 % ophthalmic solution; Instill 1-2 drops in affected eye(s) every 2 to 4 hours for the first 2 days; then instill 1-2 drops 4 times daily for an additional 5 days  Dispense: 10 mL; Refill: 0        Phone call duration:  4 minutes    Isabel Floyd MD

## 2021-06-07 NOTE — PROGRESS NOTES
"Diane Costa is a 35 y.o. female who is being evaluated via a billable telephone visit.      The patient has been notified of following:     \"This telephone visit will be conducted via a call between you and your physician/provider. We have found that certain health care needs can be provided without the need for a physical exam.  This service lets us provide the care you need with a short phone conversation.  If a prescription is necessary we can send it directly to your pharmacy.  If lab work is needed we can place an order for that and you can then stop by our lab to have the test done at a later time.    If during the course of the call the physician/provider feels a telephone visit is not appropriate, you will not be charged for this service.\"     Patient has given verbal consent to a Telephone visit? Yes    Diane Costa complains of high blood pressure.   Chief Complaint   Patient presents with     Blood Pressure Check     Patient wanting to follow up on blood pressure. Even while on blood pressure medication, patient continues to have higher readings (160/99 highest reading so far). Patient does feel heaviness in her chest, has body chills and some blurred vision. Pateint denies any chest pain, shortness of breath or fevers.      Medication Refill     Patient needing a refill on Pepcid.        I have reviewed and updated the patient's Past Medical History, Social History, Family History and Medication List.    ALLERGIES  Percocet [oxycodone-acetaminophen] and Blood-group specific substance    Additional provider notes:     Patient contacted the clinic as she was feeling somewhat unwell.  She was seen in the ER on March 1 for chest pain it was determined to be reflux related with her high blood pressure.  She was discharged from the ER with a new prescription for hydrochlorothiazide, 25 mg daily as well as Pepcid 20 mg daily.  She initially felt well but now over the last week has been feeling " a little bit off.  She has had heart rate greater than 100 for the last 4 to 5 days even if she is just sitting down, she will have hot flashes and get cold, she will get sweaty as well with these episodes.  No one else is sick, she is currently working from home.  This is kept her up a few nights.  She has checked her temperature and it is normal.  Her most recent blood pressure this morning was 125/90.    Assessment/Plan:  1. Elevated blood pressure reading without diagnosis of hypertension  -As she has been on the hydrochlorothiazide now for 3 weeks without updating her labs I will check these first.  If her labs are normal, we discussed changing to amlodipine from hydrochlorothiazide to see if this is causing her symptoms as this is the only new thing.  - Comprehensive Metabolic Panel; Future  - HM1(CBC and Differential); Future    2. Chills  -Discussed possible etiologies including electrolyte abnormalities as well as viral illness.  I am checking labs, if they are abnormal we discussed treating accordingly, she will watch for other symptoms as well such as shortness of breath or fever.  - Erythrocyte Sedimentation Rate; Future        Phone call duration:  10 minutes    Isabel Jefferson MD

## 2021-06-07 NOTE — TELEPHONE ENCOUNTER
Reached out to patient and left a message to return phone call. Please relay the below message and assist patient with scheduling.   Thank you, Claudia Velasquez

## 2021-06-07 NOTE — TELEPHONE ENCOUNTER
Patient Returning Call  Reason for call:  Appointment for [facility]   Information relayed to patient:  See Dr Zarate's message  Patient has additional questions:  No  If YES, what are your questions/concerns:  NA  Okay to leave a detailed message?: No call back needed   The patient was transferred to scheduling for a telephone visit

## 2021-06-07 NOTE — TELEPHONE ENCOUNTER
Please review due to Dr. Jefferson being away from clinic.     Patient needing a refill on     hydroCHLOROthiazide 25 mg and famotidine (PEPCID) 20 MG tablet.    Please send to [facility] [place]

## 2021-06-07 NOTE — TELEPHONE ENCOUNTER
Incoming fax from pharmacy requesting 90 day supply.    Last Med Check: 3/30/20.    Next med check due on: 8/2020.    Future Appointment Scheduled ? No.     Last Med Refill? 3/30/20.    Maritza Ibrahim, CMA

## 2021-06-07 NOTE — PROGRESS NOTES
"Diane Costa is a 35 y.o. female who is being evaluated via a billable telephone visit.      The patient has been notified of following:     \"This telephone visit will be conducted via a call between you and your physician/provider. We have found that certain health care needs can be provided without the need for a physical exam.  This service lets us provide the care you need with a short phone conversation.  If a prescription is necessary we can send it directly to your pharmacy.  If lab work is needed we can place an order for that and you can then stop by our lab to have the test done at a later time.    Telephone visits are billed at different rates depending on your insurance coverage. During this emergency period, for some insurers they may be billed the same as an in-person visit.  Please reach out to your insurance provider with any questions.    If during the course of the call the physician/provider feels a telephone visit is not appropriate, you will not be charged for this service.\"    Patient has given verbal consent to a Telephone visit? Yes    Patient would like to receive their AVS by AVS Preference: Katya.    Additional provider notes: insert own note template here     Diane today for conjunctivitis which is better.    Additionally, she was admitted to [facility] from April 6 through April 7 with recurrent symptoms related to a vertebral artery dissection.  She notes that she is having occasional tingling now in her extremities but not nearly as bad as it was.  She is had no other symptoms related to her vertebral artery dissection.  She is very tired, does still have A heaviness in her neck and shoulder as well as some aching.  She has been started on a full strength aspirin to help with anticoagulation, she was changed from a traditional birth control pill to the minipill.  She has scheduled follow-up on July 8 with the [place] neurolog[facility] clinic.  She has had some insomnia as well " lately.  She is moving so packing, is stressed as her daughter just broke her leg last night as well.    Of note her only bothersome symptoms at this time are that she will have hot flashes during the day as well as significant night sweats.  The hot flashes she states she will feel hot she will start sweating she then gets cold and shivering and will be covered in sweat.  At night she has very awful night sweats as well.    Assessment/Plan:  1. Vertebral artery dissection (H)  -At this point her symptoms are stable, she will continue with the full strength aspirin and her scheduled follow-up with neurology in July.  She will call or message sooner if her symptoms change or evolve.    2. Essential hypertension  -Blood pressure at this point was at goal in the hospital, continue to monitor and continue on her hydrochlorothiazide.  Exercise as able and watch her diet.  Follow-up here in July or August for a routine physical and we can reevaluate.  Labs done at the hospital were normal    3. Bruising  -Discussed that this is likely secondary to her anticoagulation as well as her regular life activities including moving and taking care of 3 children.  She will monitor for any increase in swelling, warmth or pain which would indicate something more sinister but for now continue to monitor.    4. Night sweats  -Labs were normal in the hospital, I suspect at this point this is most likely related to the change in birth control.  Could also be from her Effexor but would have suspected that this would have started sooner than most recently.  If her symptoms are not resolving over the next several weeks then she should return to clinic for basic labs including hemogram, sed rate, thyroid labs and a CMP for further evaluation.        Phone call duration:  11 minutes    Isabel Jefferson MD

## 2021-06-08 ENCOUNTER — AMBULATORY - HEALTHEAST (OUTPATIENT)
Dept: FAMILY MEDICINE | Facility: CLINIC | Age: 36
End: 2021-06-08

## 2021-06-08 DIAGNOSIS — N30.01 ACUTE CYSTITIS WITH HEMATURIA: ICD-10-CM

## 2021-06-08 NOTE — TELEPHONE ENCOUNTER
Last medication refill was 05/04/2020 for 30 tabs with 2 refills      Per pharmacy pt would like a 90 day supply of medication     Will route request to provider     Dennise Alonso CMA

## 2021-06-08 NOTE — PROGRESS NOTES
ASSESSMENT/PLAN:       1. Tingling in extremities  -Most consistent with neuropathy, will refer for EMG as her labs have been normal. Discussed gabapentin, do not want to mask anything prior to EMG, can consider this after her EMG to see if this manages symptoms including her hot flashes.   - EMG- Both Arms; Future  - EMG- Both Legs; Future    2. Vertebral artery dissection (H)  -Discussed case with on call neurologist who agrees this is unlikely secondary to her dissection given the bilateral nature. Discussed with patient that she should present to the ER if acute worsening symptoms or changes.   -Continue on aspirin as well.         PROGRESS NOTE   5/20/2020    SUBJECTIVE:  Diane Costa is a 35 y.o. female  who presents for comes in for multiple issues.     She is having hand and foot tingling. It's never bad enough that she can't walk around. She has constant feelings of the tingling and is having electrical shocking and it is hands and feet. She doesn't think that it is carpal tunnel related as it can kick in whenever it wants to. It can be quite painful, not always super painful. The tingling has been since she was at [place]. It started since she had the decorticate posturing and clenching. It coes to mid forearm and on her legs it's only the toes and the tops of her feet, it doesn't get the her ankle.    In addition to this, she had labs done for hot flashes and chills. Still hapenning. This all happened after the dissection as well. She had no issues prior to the dissection. She has had her birth control changed to the mini-pill.   Chief Complaint   Patient presents with     Follow-up     lab results,      Hand Pain     hands and feet tingle and pain ,          Patient Active Problem List   Diagnosis     Dyshidrosis     Irritable Bowel Syndrome     Vitamin D Deficiency     Anxiety     Essential Hypertriglyceridemia     Obstructive Sleep Apnea     Obesity (BMI 35.0-39.9) with comorbidity (H)      Benign essential hypertension     Family history of cardiomyopathy       Current Outpatient Medications   Medication Sig Dispense Refill     aspirin 325 MG EC tablet Take 1 tablet (325 mg total) by mouth daily. 100 tablet 3     cholecalciferol, vitamin D3, (VITAMIN D3) 2,000 unit cap Take 1 tablet by mouth daily.       famotidine (PEPCID) 20 MG tablet Take 1 tablet (20 mg total) by mouth 2 (two) times a day as needed for heartburn. 180 tablet 1     hydroCHLOROthiazide (HYDRODIURIL) 25 MG tablet Take 1 tablet (25 mg total) by mouth daily. 90 tablet 1     metoprolol succinate (TOPROL XL) 25 MG Take 1 tablet (25 mg total) by mouth daily. 30 tablet 2     norethindrone (JOLIVETTE) 0.35 mg tablet Take 1 tablet (0.35 mg total) by mouth daily. 84 tablet 1     soft lens adjunctive solutions (KEARA REWETTING DROPS OPHT) Apply to eye as needed.       venlafaxine (EFFEXOR-XR) 150 MG 24 hr capsule TAKE 1 CAPSULE BY MOUTH EVERY DAY 30 capsule 5     No current facility-administered medications for this visit.        Social History     Tobacco Use   Smoking Status Never Smoker   Smokeless Tobacco Never Used           OBJECTIVE:        Recent Results (from the past 240 hour(s))   HIV Antigen/Antibody Screening Cascade   Result Value Ref Range    HIV Antigen / Antibody Negative Negative   C-Reactive Protein   Result Value Ref Range    CRP 0.4 0.0 - 0.8 mg/dL   QTF-Mycobacterium tuberculosis by QuantiFERON-TB Gold Plus   Result Value Ref Range    QTF RESULT Negative Negative    QTF INTREPRETATION       No interferon-gamma response to M. tuberculosis antigens was detected.  Infecton with M. tuberculosis is unlikely.  A negative result alone does not exclude infection with M. tuberculosis    QTF NIL 0.04 IU/mL    QTF ANTIGEN TB1-NIL -0.02 IU/mL    QTF ANTIGEN TB2 - NIL -0.01 IU/mL    QTF MITOGEN-NIL 6.60 IU/mL   Ehrlichia chaffeensis Antibodies, IgG / IgM by IFA   Result Value Ref Range    Ehrlichia chaffeensis Antibody, IgG <1:64 <1:64     Ehrlichia chaffeensis Antibody, IgM < 1:16 <1:16   Anaplasma phagocytophilum (HGA) Antibodies, IgG and IgM   Result Value Ref Range    A. Phagocytophilum Antibody, IgG <1:80 <1:80    A. Phagocytophilum Antibody, IgM < 1:16 <1:16   Lyme Antibody Cascade   Result Value Ref Range    Lyme Antibody Cascade <0.01 <0.90 Index Value   Babesia Species by PCR   Result Value Ref Range    Babesia species by PCR Not Detected     Babesia microti by PCR Not Detected    Follicle Stimulating Hormone (FSH)   Result Value Ref Range    FSH 6.6 mIU/mL   Urinalysis Macroscopic   Result Value Ref Range    Color, UA Yellow Colorless, Yellow, Straw, Light Yellow    Clarity, UA Clear Clear    Glucose, UA Negative Negative    Bilirubin, UA Negative Negative    Ketones, UA Negative Negative    Specific Gravity, UA 1.025 1.005 - 1.030    Blood, UA Large (!) Negative    pH, UA 7.0 5.0 - 8.0    Protein, UA Negative Negative mg/dL    Urobilinogen, UA 0.2 E.U./dL 0.2 E.U./dL, 1.0 E.U./dL    Nitrite, UA Negative Negative    Leukocytes, UA Negative Negative   Culture, Urine    Specimen: Urine   Result Value Ref Range    Culture No Growth    Comprehensive Metabolic Panel   Result Value Ref Range    Sodium 143 136 - 145 mmol/L    Potassium 3.4 (L) 3.5 - 5.0 mmol/L    Chloride 100 98 - 107 mmol/L    CO2 27 22 - 31 mmol/L    Anion Gap, Calculation 16 5 - 18 mmol/L    Glucose 88 70 - 125 mg/dL    BUN 10 8 - 22 mg/dL    Creatinine 0.82 0.60 - 1.10 mg/dL    GFR MDRD Af Amer >60 >60 mL/min/1.73m2    GFR MDRD Non Af Amer >60 >60 mL/min/1.73m2    Bilirubin, Total 0.2 0.0 - 1.0 mg/dL    Calcium 9.8 8.5 - 10.5 mg/dL    Protein, Total 7.1 6.0 - 8.0 g/dL    Albumin 4.0 3.5 - 5.0 g/dL    Alkaline Phosphatase 65 45 - 120 U/L    AST 44 (H) 0 - 40 U/L    ALT 76 (H) 0 - 45 U/L   Blood culture from PERIPHERAL SITE    Specimen: Vein, Peripheral; Blood   Result Value Ref Range    Anaerobic Blood Culture Bottle No Growth No Growth, No organisms seen, bottle returned to  "instrument, Specimen not received, No Growth at 24 hours, No Growth at 48 hours, No Growth at 72 hours, No Growth at 96 hours, No Growth at 120 hours    Aerobic Blood Culture Bottle No Growth No Growth, No organisms seen, bottle returned to instrument, Specimen not received, No Growth at 24 hours, No Growth at 120 hours, No Growth at 48 hours, No Growth at 72 hours, No Growth at 96 hours   Blood Culture from PERIPHERAL SITE (second one)    Specimen: Vein, Peripheral; Blood   Result Value Ref Range    Anaerobic Blood Culture Bottle No Growth No Growth, No organisms seen, bottle returned to instrument, Specimen not received, No Growth at 24 hours, No Growth at 48 hours, No Growth at 72 hours, No Growth at 96 hours, No Growth at 120 hours    Aerobic Blood Culture Bottle No Growth No Growth, No organisms seen, bottle returned to instrument, Specimen not received, No Growth at 24 hours, No Growth at 120 hours, No Growth at 48 hours, No Growth at 72 hours, No Growth at 96 hours   HM1 (CBC with Diff)   Result Value Ref Range    WBC 7.5 4.0 - 11.0 thou/uL    RBC 4.90 3.80 - 5.40 mill/uL    Hemoglobin 14.7 12.0 - 16.0 g/dL    Hematocrit 43.6 35.0 - 47.0 %    MCV 89 80 - 100 fL    MCH 30.1 27.0 - 34.0 pg    MCHC 33.8 32.0 - 36.0 g/dL    RDW 12.6 11.0 - 14.5 %    Platelets 234 140 - 440 thou/uL    MPV 8.3 7.0 - 10.0 fL    Neutrophils % 64 50 - 70 %    Lymphocytes % 28 20 - 40 %    Monocytes % 5 2 - 10 %    Eosinophils % 2 0 - 6 %    Basophils % 1 0 - 2 %    Neutrophils Absolute 4.8 2.0 - 7.7 thou/uL    Lymphocytes Absolute 2.1 0.8 - 4.4 thou/uL    Monocytes Absolute 0.4 0.0 - 0.9 thou/uL    Eosinophils Absolute 0.2 0.0 - 0.4 thou/uL    Basophils Absolute 0.1 0.0 - 0.2 thou/uL       Vitals:    05/20/20 1504   BP: 118/82   Pulse: 98   SpO2: 98%   Weight: 208 lb (94.3 kg)   Height: 5' 8\" (1.727 m)     Weight: 208 lb (94.3 kg)          Physical Exam:  GENERAL APPEARANCE: A&A, NAD, well hydrated, well nourished  SKIN:  Normal skin " turgor, no lesions/rashes   HEENT: moist mucous membranes, no rhinorrhea  NECK: Normal, no bruit  CV: RRR, no M/G/R   LUNGS: CTAB  EXTREMITY: no edema   NEURO: no gross deficits, specifically normal reflexes in arms and legs at the bicep, tricep, brachioradialis, patellar and achilles tendons. She has normal strength in the upper and lower extremities.   Psych: Normal affect, well dressed and groomed.

## 2021-06-08 NOTE — TELEPHONE ENCOUNTER
"Patient on lab schedule 6/10/2020.  Appt note states \"ordered by Dr. Jefferson\".  Need order please.  "

## 2021-06-09 NOTE — PATIENT INSTRUCTIONS - HE
Thank you for choosing the [facility] for your EMG testing.    The ordering provider will receive your final EMG results within the next few days.  Please follow up with your provider for the results and further treatment recommendations.

## 2021-06-09 NOTE — TELEPHONE ENCOUNTER
Refill request for medication: Gabapentin  Last visit addressing this medication: 5/20/20 OV, 5/22/20 MyChart Message.  Follow up plan Unknown  months  Last refill on 5/26/20, quantity #60   CSA completed Not on file   checked  06/18/20, last dispensed refill 5/26/20    Appointment: Not due     Gabby Metcalf LPN

## 2021-06-09 NOTE — TELEPHONE ENCOUNTER
Last Med Check: 5/20/2020    Next med check due on: ?    CSA on File: n/a    Future Appointment Scheduled ? no    Last Med Refill? 6/19/2020    Luis Fernando Boyer MA

## 2021-06-09 NOTE — PROGRESS NOTES
Patient presents at the request of Isabel Jefferson for upper and lower extremity EMG.  She has right equal to left hand and foot numbness and tingling intermittently but all fingers and toes are involved.  Does travel up into the forearm and up into the lower legs.  She is right-handed.  This started in February after a vertebral artery dissection.    EMG/NCS  results: Please see scanned full report.    Comment NCS: Abnormal study:    1.  Prolonged right median versus ulnar transcarpal latency comparison.  2.  Normal right median and ulnar SNAPs, ulnar transcarpal study, median ulnar CMAPs.  3.  Normal right lower extremity NCS including sural SNAP, peroneal and tibial CMAPs.      Comment EMG: Normal study.  1.  Normal needle EMG right upper and right lower.    Interpretation: Abnormal study: There is electrodiagnostic evidence of:    1.  Right median neuropathy at the wrist consistent with entrapment in the carpal tunnel, very mild in severity.    2. There is no electrodiagnostic evidence of cervical radiculopathy, brachial plexopathy, or ulnar neuropathy in the right upper extremity.    3.  There is no electrodiagnostic evidence of lumbosacral radiculopathy, lumbosacral plexopathy, focal neuropathy, or peripheral polyneuropathy in the right lower extremity.    The testing was completed in its entirety by the physician.      It was our pleasure caring for your patient today, if there any questions or concerns please do not hesitate to contact us.

## 2021-06-09 NOTE — TELEPHONE ENCOUNTER
RN cannot approve Refill Request    RN can NOT refill this medication Protocol failed and NO refill given. Last office visit: 5/20/2020 Isabel Jefferson MD Last Physical: 4/12/2019 Last MTM visit: Visit date not found Last visit same specialty: 5/20/2020 Isabel Jefferson MD.  Next visit within 3 mo: Visit date not found  Next physical within 3 mo: Visit date not found      Stacey Santacruz, [facility] Triage/Med Refill 7/13/2020    Requested Prescriptions   Pending Prescriptions Disp Refills     venlafaxine (EFFEXOR-XR) 150 MG 24 hr capsule [Pharmacy Med Name: VENLAFAXINE HCL  MG CAP] 90 capsule 1     Sig: TAKE 1 CAPSULE BY MOUTH EVERY DAY       Venlafaxine/Desvenlafaxine Refill Protocol Failed - 7/13/2020 12:34 AM        Failed - Fasting lipid cascade in last year     Cholesterol   Date Value Ref Range Status   04/12/2019 151 <=199 mg/dL Final     Triglycerides   Date Value Ref Range Status   04/12/2019 106 <=149 mg/dL Final     HDL Cholesterol   Date Value Ref Range Status   04/12/2019 44 (L) >=50 mg/dL Final     LDL Calculated   Date Value Ref Range Status   04/12/2019 86 <=129 mg/dL Final     Patient Fasting > 8hrs?   Date Value Ref Range Status   04/12/2019 Yes  Final   04/12/2019 Yes  Final             Passed - LFT or AST or ALT in last year     Albumin   Date Value Ref Range Status   06/10/2020 3.9 3.5 - 5.0 g/dL Final     Bilirubin, Total   Date Value Ref Range Status   06/10/2020 0.3 0.0 - 1.0 mg/dL Final     Bilirubin, Direct   Date Value Ref Range Status   04/03/2020 0.1 <=0.5 mg/dL Final     Alkaline Phosphatase   Date Value Ref Range Status   06/10/2020 62 45 - 120 U/L Final     AST   Date Value Ref Range Status   06/10/2020 31 0 - 40 U/L Final     ALT   Date Value Ref Range Status   06/10/2020 46 (H) 0 - 45 U/L Final     Protein, Total   Date Value Ref Range Status   06/10/2020 7.0 6.0 - 8.0 g/dL Final                Passed - PCP or prescribing provider visit in last year      Last office visit with prescriber/PCP: 5/20/2020 Isabel Jefferson MD OR same dept: 12/18/2019 Isabel Jefferson MD OR same specialty: 5/20/2020 Isabel Jefferson MD  Last physical: 4/12/2019 Last MTM visit: Visit date not found   Next visit within 3 mo: Visit date not found  Next physical within 3 mo: Visit date not found  Prescriber OR PCP: Isabel Jefferson MD  Last diagnosis associated with med order: 1. Anxiety  - venlafaxine (EFFEXOR-XR) 150 MG 24 hr capsule [Pharmacy Med Name: VENLAFAXINE HCL  MG CAP]; TAKE 1 CAPSULE BY MOUTH EVERY DAY  Dispense: 90 capsule; Refill: 1    If protocol passes may refill for 12 months if within 3 months of last provider visit (or a total of 15 months).             Passed - Blood Pressure in last year     BP Readings from Last 1 Encounters:   05/20/20 118/82

## 2021-06-09 NOTE — PROGRESS NOTES
ASSESSMENT:  1. Obstructive Sleep Apnea  diethylpropion 75 mg TbER   2. Essential Hypertriglyceridemia  diethylpropion 75 mg TbER   3. BMI 36.0-36.9,adult  diethylpropion 75 mg TbER         PLAN:  Follow up in 1 month.  Continue supplements.  Doing well with diethylpriopion, refill given today.  Continue working on weight loss for hypertriglyceridemia.  Continue CPAP for AZALEA, continue to work on weight loss for this as well.  Recommend increasing movement throughout the day--sitting less, moving more.  Will increase activity over time to reach a goal of at least 150 minutes of moderate exercise each week.  Behavior modification:  Cognitive restructuring exercises, journaling stressors, triggers for food cravings.  Dietary Intervention:  Reduced calorie, reduced carbohydrates, whole food diet.  Greater than 50% of this 15 minute visit was spent in counseling regarding patient's obesity, medications, dietary, exercise, and behavior modification recommendations.      SUBJECTIVE  Patient presents for treatment of chronic, comorbid conditions (hypertriglyceridemia, obstructive sleep apnea) using intensive therapeutic lifestyle interventions including nutrition, physical activity, and behavior management.  Despite not losing much weight over the past month, she feels like she is really doing very well mentally with regards to her weight loss and overall being more mindful and making healthy choices.  She has been very active.  Her moods have been good.  She feels like she is managing her stress levels well and overall making really good choices.  She continues to use CPAP for treatment of obstructive sleep apnea and work on weight loss for treatment of hyperlipidemia.    Are you experiencing any side effects to the medications:  No  Hunger control:  good  Exercise was discussed: yes  Taking supplements:  yes  Discussed journaling food:  yes  Patient is pleased with the current results:  yes  The patient is following the  nutrition plan:  yes  Barriers to losing weight:  Behavior:  Amnesia Calories:   habit    Patient Active Problem List   Diagnosis     Dyshidrosis     Irritable Bowel Syndrome     Vitamin D Deficiency     Anxiety     Essential Hypertriglyceridemia     Obstructive Sleep Apnea     Obesity (BMI 30-39.9)      (normal spontaneous vaginal delivery)     BMI 36.0-36.9,adult       Current Outpatient Prescriptions on File Prior to Visit   Medication Sig Dispense Refill     cholecalciferol, vitamin D3, (VITAMIN D3) 2,000 unit cap Take 1 tablet by mouth daily.       citalopram (CELEXA) 20 MG tablet Take 1.5 tablets (30 mg total) by mouth daily. 45 tablet 5     MULTIVITAMIN ORAL Take 1 tablet by mouth daily.       norethindrone-ethinyl estradiol 0.5/0.75/1 mg- 35 mcg per tablet Take 1 tablet by mouth daily. 84 tablet 3     OMEGA-3/DHA/EPA/FISH OIL (FISH OIL-OMEGA-3 FATTY ACIDS) 300-1,000 mg capsule Take 2 g by mouth daily.       [DISCONTINUED] diethylpropion 75 mg TbER Take 75 mg by mouth daily. 30 each 0     No current facility-administered medications on file prior to visit.        The following portions of the patient's history were reviewed and updated as appropriate: allergies, current medications, past family history, past medical history, past social history, past surgical history and problem list.    Review of Systems  A 12 point comprehensive review of systems was negative except as noted.        OBJECTIVE:  Vitals:    17 0708   BP: 116/68   Pulse: 84     Initial Weight: 231.7 lbs  Weight: 220 lb 14.4 oz (100.2 kg)  Weight loss from initial: 10.8  % Weight loss: 4.66 %  Body mass index is 34.09 kg/(m^2).  General:  Patient is alert, pleasant, no distress.  Patient is obese.       .  This note has been dictated using voice recognition software. Any grammatical or context distortions are unintentional and inherent to the software

## 2021-06-09 NOTE — PROGRESS NOTES
"    Assessment:  This is a 32 y.o.female who presents with concern about a single episode of breakthrough bleeding on her oral contraceptives during the third week of pills.  She describes having some pelvic muscle spasms and a history of the same for which she went through a therapy program 7 or 8 years ago.      1. Dysfunctional uterine bleeding     2. Pelvic pain     3. Need for vaccination  Influenza, Seasonal Quad, Preservative Free 36+ Months         Plan:  I reassured her that occasional breakthrough bleeding on oral contraceptives is not uncommon and not generally something to worry about.  If it continues to occur month after month, her primary doctor may want to change which birth control pill she is taking, which usually solves the problem.  She should carry out the exercises that she learned in her pelvic floor program to manage the pelvic cramping.  She is not having any urinary symptoms and it is unlikely that her symptoms are due to a UTI.  If she continues to have difficulty we can refer her for a refresher course of pelvic floor therapy.  She is  and monogamous and denies any risk for STDs.  She mainly needed reassurance that the breakthrough spotting and cramping are likely completely benign and that she can safely watch her symptoms for now.  I recommended a flu shot and she agreed and it was given today.  Follow-up as needed.        Subjective:  This is a 32 y.o.female who presents with 2 complaints.  She is on oral contraceptives and on January 24 she started having light vaginal spotting during the third week of her pills.  She apparently has never had that occur on oral contraceptives previously.  She also had some pelvic discomfort or spasms intermittently.  She says 7 or 8 years ago she went through physical therapy program for pelvic floor \"tension\" and frequent UTIs.  She denies any urinary frequency, dysuria, or other urinary symptoms.  She is  and monogamous and denies any " risk of STDs.  Her last Pap was normal on 7/11/2014 and she has had a normal pregnancy and delivery since then.      Objective:      Visit Vitals     /84 (Patient Site: Left Arm, Patient Position: Sitting, Cuff Size: Adult Large)     Pulse 66     Temp 97.5  F (36.4  C) (Oral)     Wt (!) 223 lb (101.2 kg)     LMP 01/24/2017     Breastfeeding No     BMI 34.41 kg/m2     History   Smoking Status     Never Smoker   Smokeless Tobacco     Never Used       Physical Exam:   She is alert, anxious, otherwise in no distress.  She declined a pelvic exam.        Current Outpatient Prescriptions on File Prior to Visit   Medication Sig Dispense Refill     cholecalciferol, vitamin D3, (VITAMIN D3) 2,000 unit cap Take 1 tablet by mouth daily.       citalopram (CELEXA) 20 MG tablet Take 1.5 tablets (30 mg total) by mouth daily. 45 tablet 5     MULTIVITAMIN ORAL Take 1 tablet by mouth daily.       norethindrone-ethinyl estradiol 0.5/0.75/1 mg- 35 mcg per tablet Take 1 tablet by mouth daily. 84 tablet 3     OMEGA-3/DHA/EPA/FISH OIL (FISH OIL-OMEGA-3 FATTY ACIDS) 300-1,000 mg capsule Take 2 g by mouth daily.       No current facility-administered medications on file prior to visit.            Vonda Winters M.D.      This note has been dictated using voice recognition software.  Any grammatical, typographical, or context distortions are unintentional and inherent to the software.

## 2021-06-10 NOTE — PROGRESS NOTES
ASSESSMENT:  1. Obstructive Sleep Apnea  diethylpropion 75 mg TbER    Insulin, Fasting    Lipid Cascade    Comprehensive Metabolic Panel    Vitamin D, Total (25-Hydroxy)    Glycosylated Hemoglobin A1c   2. Essential Hypertriglyceridemia  diethylpropion 75 mg TbER    Insulin, Fasting    Lipid Cascade    Comprehensive Metabolic Panel    Vitamin D, Total (25-Hydroxy)    Glycosylated Hemoglobin A1c   3. BMI 36.0-36.9,adult  diethylpropion 75 mg TbER    Insulin, Fasting    Lipid Cascade    Comprehensive Metabolic Panel    Vitamin D, Total (25-Hydroxy)    Glycosylated Hemoglobin A1c         PLAN:  Follow up in 1 month.  Continue supplements.   Labs ordered and will review and discuss with the patient.  Consider treatment with metformin if patient is developing prediabetes or other signs of insulin resistance.  Work on weight loss for treatment of obstructive sleep apnea.  Doing well with diethylpropion, refill given today.  Recommend increasing movement throughout the day--sitting less, moving more.  Will increase activity over time to reach a goal of at least 150 minutes of moderate exercise each week.  Behavior modification:  Cognitive restructuring exercises, journaling stressors, triggers for food cravings.  Dietary Intervention:  Reduced calorie, reduced carbohydrates, whole food diet.  Greater than 50% of this 15 minute visit was spent in counseling regarding patient's obesity, medications, dietary, exercise, and behavior modification recommendations.      SUBJECTIVE  Patient presents for treatment of chronic, comorbid conditions (obstructive sleep apnea, hypertriglyceridemia) using intensive therapeutic lifestyle interventions including nutrition, physical activity, and behavior management.  She is really been struggling over this past month with a lot of issues with stress at work.  She feels like she is not eating as well and not getting as much physical activity.  She feels like she just is not doing well.  She  is frustrated because she tends to carry her weight around her middle.  The last time her lab work was checked was over a year ago and we discussed getting some fasting labs today including a fasting insulin level as I suspect this may be elevated despite having a normal fasting blood sugar and A1c with the excess weight that she has around the midsection.  We discussed ways to help improve stress at work.    Are you experiencing any side effects to the medications:  No  Hunger control:  good  Exercise was discussed: yes  Taking supplements:  yes  Discussed journaling food:  yes  Patient is pleased with the current results:  no  The patient is following the nutrition plan:  Not recently  Barriers to losing weight:  Behavior:  Amnesia Calories:   stress (comfort)    Patient Active Problem List   Diagnosis     Dyshidrosis     Irritable Bowel Syndrome     Vitamin D Deficiency     Anxiety     Essential Hypertriglyceridemia     Obstructive Sleep Apnea     Obesity (BMI 30-39.9)      (normal spontaneous vaginal delivery)     BMI 36.0-36.9,adult       Current Outpatient Prescriptions on File Prior to Visit   Medication Sig Dispense Refill     cholecalciferol, vitamin D3, (VITAMIN D3) 2,000 unit cap Take 1 tablet by mouth daily.       citalopram (CELEXA) 20 MG tablet Take 1.5 tablets (30 mg total) by mouth daily. 45 tablet 5     MULTIVITAMIN ORAL Take 1 tablet by mouth daily.       norethindrone-ethinyl estradiol 0.5/0.75/1 mg- 35 mcg per tablet Take 1 tablet by mouth daily. 84 tablet 3     OMEGA-3/DHA/EPA/FISH OIL (FISH OIL-OMEGA-3 FATTY ACIDS) 300-1,000 mg capsule Take 2 g by mouth daily.       [DISCONTINUED] diethylpropion 75 mg TbER Take 75 mg by mouth daily. 30 each 0     No current facility-administered medications on file prior to visit.        The following portions of the patient's history were reviewed and updated as appropriate: allergies, current medications, past family history, past medical history, past  social history, past surgical history and problem list.    Review of Systems  A 12 point comprehensive review of systems was negative except as noted.        OBJECTIVE:  Vitals:    04/25/17 0723   BP: 124/80   Pulse: 80     Initial Weight: 231.7 lbs  Weight: 222 lb (100.7 kg)  Weight loss from initial: 9.7  % Weight loss: 4.19 %  Body mass index is 34.26 kg/(m^2).  General:  Patient is alert, pleasant, no distress.  Patient is obese.       .  This note has been dictated using voice recognition software. Any grammatical or context distortions are unintentional and inherent to the software

## 2021-06-11 NOTE — PROGRESS NOTES
ASSESSMENT/PLAN:       1. Anxiety  -She continues to struggle with anxiety with grossly elevated ALFONSO 7 and PHQ 9 today.  We discussed options including referral to mental health as well as considering increasing her medications.  Given the options she is agreed to both at this time.  She is concerned that she may have a disorder relationship with food but she is not confident of that at this time.  She would like to explore this a little bit further however.  Going to have her increase her citalopram from 30-40 mg.  We discussed the possibility of starting a low-dose of Wellbutrin as well to see if this helps with some mild appetite suppression but this she will look into.  She will let me know if she is interested in this.  She will follow-up here in 3 months if she is doing well with therapy, sooner if things are not improving.  - citalopram (CELEXA) 40 MG tablet; Take 1 tablet (40 mg total) by mouth daily.  Dispense: 30 tablet; Refill: 2  - Ambulatory referral to Psychology      Isabel Jefferson MD      PROGRESS NOTE   6/7/2017    SUBJECTIVE:  Diane Costa is a 32 y.o. female  who presents for follow up.     She does have a lot of good days and then will have one bad day and will down spiral from there. She seems to mainly have anxiety issues with weight or food. Does have a tendency to not do things because she doesn't want to be around people because she is concerned about her weight. She will go on binges where she will do well with eating habits and then will not. She has never contemplated eating disorders from a purging or restrictive perspective. She has noted that maybe even 90 % of her anxiety is weight and food related.     The birth control is working fine other than this last month where she had a goofy period.   Chief Complaint   Patient presents with     Medication Management     Patient is here today to review her current medications.          Patient Active Problem List    Diagnosis     Dyshidrosis     Irritable Bowel Syndrome     Vitamin D Deficiency     Anxiety     Essential Hypertriglyceridemia     Obstructive Sleep Apnea     Obesity (BMI 30-39.9)     BMI 36.0-36.9,adult       Current Outpatient Prescriptions   Medication Sig Dispense Refill     cholecalciferol, vitamin D3, (VITAMIN D3) 2,000 unit cap Take 1 tablet by mouth daily.       citalopram (CELEXA) 40 MG tablet Take 1 tablet (40 mg total) by mouth daily. 30 tablet 2     MULTIVITAMIN ORAL Take 1 tablet by mouth daily.       NECON 7/7/7, 28, 0.5/0.75/1 mg- 35 mcg per tablet TAKE ONE TABLET BY MOUTH ONCE DAILY 84 tablet 1     diethylpropion 75 mg TbER Take 75 mg by mouth daily. 30 each 0     OMEGA-3/DHA/EPA/FISH OIL (FISH OIL-OMEGA-3 FATTY ACIDS) 300-1,000 mg capsule Take 2 g by mouth daily.       No current facility-administered medications for this visit.        History   Smoking Status     Never Smoker   Smokeless Tobacco     Never Used           OBJECTIVE:        No results found for this or any previous visit (from the past 240 hour(s)).    Vitals:    06/07/17 0708   BP: 114/72   Patient Site: Right Arm   Patient Position: Sitting   Cuff Size: Adult Large   Pulse: 74   Weight: (!) 228 lb 14.4 oz (103.8 kg)     Weight: 228 lb 14.4 oz (103.8 kg)        Physical Exam:  GENERAL APPEARANCE: A&A, NAD, well hydrated, well nourished  SKIN:  Normal skin turgor, no lesions/rashes   HEENT: moist mucous membranes, no rhinorrhea, pharynx unremarkable  NECK: Normal, without lymphadenopathy  CV: RRR, no M/G/R   LUNGS: CTAB  EXTREMITY: no edema   NEURO: no gross deficits   Psych: Her affect is bright, she is casually dressed and groomed, her thought process and speech pattern are normal, she makes normal eye contact

## 2021-06-11 NOTE — TELEPHONE ENCOUNTER
RN cannot approve Refill Request    RN can NOT refill this medication Protocol failed and NO refill given. Last office visit: 5/20/2020 Isabel Jefferson MD Last Physical: 4/12/2019 Last MTM visit: Visit date not found Last visit same specialty: 5/20/2020 Isabel Jefferson MD.  Next visit within 3 mo: Visit date not found  Next physical within 3 mo: Visit date not found      Adelaida Evans, [facility] Triage/Med Refill 9/19/2020    Requested Prescriptions   Pending Prescriptions Disp Refills     norethindrone (MICRONOR) 0.35 mg tablet [Pharmacy Med Name: NORETHINDRONE 0.35 MG TABLET] 84 tablet 1     Sig: TAKE 1 TABLET BY MOUTH EVERY DAY       There is no refill protocol information for this order

## 2021-06-12 NOTE — PROGRESS NOTES
ASSESSMENT/PLAN:       1. Anxiety  -Unfortunately her anxiety is flared due to situational issues including familial stresses, financial stresses and current political climate.  She is going to look into therapy, and can increase her venlafaxine to 225 mg orally daily and she will plan on following up with me in approximately 3 to 4 weeks for recheck.  She will call sooner if things are worsening.  - venlafaxine 225 mg TR24; Take 225 mg by mouth daily.  Dispense: 30 each; Refill: 2    2. Essential hypertension  -Blood pressure remains somewhat elevated here today.  As she is somewhat tachycardic as well I am going to increase her metoprolol to 50 mg orally daily.  Again she will follow up with me in about 3 weeks.  - metoprolol succinate (TOPROL-XL) 50 MG 24 hr tablet; Take 1 tablet (50 mg total) by mouth daily.  Dispense: 90 tablet; Refill: 1    3. Contraceptive Management:   -Discussed that this is likely normal for the type of birth control that she is on.  She can try changing to start the new pack when she is done with her next menstrual period to see if this helps.      Return in about 3 weeks (around 10/30/2020).      Isabel Jefferson MD      PROGRESS NOTE   10/9/2020    SUBJECTIVE:  Diane Costa is a 35 y.o. female  who presents for follow up.     She is doing poorly with her mental health. She has had a hard year in general. She does have some marital problems, stress with just moving as well as she lost her job due to Covid. She is going to go to couples therapy at [facility],     She does note that her periods are goofy. She does have her bleeding on week two of the pill pack. She does remember to take her pill daily.     She is better with her tingling now. Night sweats are still bad as well. Her blood pressure is better.   Chief Complaint   Patient presents with     Mental Health Problem     Patient would like to discuss her mental health.      Menstrual Problem     Patient  would like to discuss her menstrual cycles while on birth control. Patient states she gets her menstrual cycle week two of the pill pack.          Patient Active Problem List   Diagnosis     Dyshidrosis     Irritable Bowel Syndrome     Vitamin D Deficiency     Anxiety     Essential Hypertriglyceridemia     Obstructive Sleep Apnea     Obesity (BMI 35.0-39.9) with comorbidity (H)     Benign essential hypertension     Family history of cardiomyopathy       Current Outpatient Medications   Medication Sig Dispense Refill     aspirin 325 MG EC tablet Take 1 tablet (325 mg total) by mouth daily. 100 tablet 3     cholecalciferol, vitamin D3, (VITAMIN D3) 2,000 unit cap Take 1 tablet by mouth daily.       famotidine (PEPCID) 20 MG tablet Take 1 tablet (20 mg total) by mouth 2 (two) times a day as needed for heartburn. 180 tablet 1     gabapentin (NEURONTIN) 300 MG capsule Take 1 capsule (300 mg total) by mouth every morning AND 2 capsules (600 mg total) at bedtime. 90 capsule 2     hydroCHLOROthiazide (HYDRODIURIL) 25 MG tablet Take 1 tablet (25 mg total) by mouth daily. 90 tablet 1     metoprolol succinate (TOPROL-XL) 50 MG 24 hr tablet Take 1 tablet (50 mg total) by mouth daily. 90 tablet 1     norethindrone (MICRONOR) 0.35 mg tablet TAKE 1 TABLET BY MOUTH EVERY DAY 84 tablet 1     venlafaxine 225 mg TR24 Take 225 mg by mouth daily. 30 each 2     No current facility-administered medications for this visit.        Social History     Tobacco Use   Smoking Status Never Smoker   Smokeless Tobacco Never Used           OBJECTIVE:        No results found for this or any previous visit (from the past 240 hour(s)).    Vitals:    10/09/20 1358   BP: 132/90   Patient Site: Left Arm   Patient Position: Sitting   Cuff Size: Adult Regular   Pulse: (!) 108   SpO2: 97%   Weight: 219 lb 12.8 oz (99.7 kg)     Weight: 219 lb 12.8 oz (99.7 kg)          Physical Exam:  GENERAL APPEARANCE: A&A, NAD, well hydrated, well nourished  SKIN:  Normal  skin turgor, no lesions/rashes   HEENT: moist mucous membranes, no rhinorrhea  NECK: Normal  CV: RRR, no M/G/R   LUNGS: CTAB  Psych: Her affect is appropriate, she is casually dressed and groomed, her thought process and speech pattern are normal, eye contact is normal  EXTREMITY: no edema   NEURO: no gross deficits

## 2021-06-12 NOTE — PROGRESS NOTES
"Diane Costa is a 35 y.o. female who is being evaluated via a billable telephone visit.      The patient has been notified of following:     \"This telephone visit will be conducted via a call between you and your physician/provider. We have found that certain health care needs can be provided without the need for a physical exam.  This service lets us provide the care you need with a short phone conversation.  If a prescription is necessary we can send it directly to your pharmacy.  If lab work is needed we can place an order for that and you can then stop by our lab to have the test done at a later time.    Telephone visits are billed at different rates depending on your insurance coverage. During this emergency period, for some insurers they may be billed the same as an in-person visit.  Please reach out to your insurance provider with any questions.    If during the course of the call the physician/provider feels a telephone visit is not appropriate, you will not be charged for this service.\"    Patient has given verbal consent to a Telephone visit? Yes    What phone number would you like to be contacted at? [phone]    Patient would like to receive their AVS by AVS Preference: Katya.    Additional provider notes:        SUBJECTIVE:  Diane Costa is a 35 y.o. female  who presents for medication check.     She does feel better at this time, but is not overly better yet. She is tolerating her medication without difficulties. Situational it's not helping. She does think that she is coping well. She has been playing phone tag with [employer] for the last few weeks. She does not think that things are going ok right now but he does think that things are ok. Her kids are doing ok.     She is still having panic attacks, at least one time a day.  She especially has them at nighttime.  She will fall asleep okay because she is so tired and wake up and have perseverating thoughts and panic.    She notes " that her blood pressure has been ok. She is taking her medication without difficulty. She does not have any side effects from it. She does have a cuff at home. Her pulse has been high as well.   Chief Complaint   Patient presents with     Mental Health Problem     Four week follow up since increase of venlafaxine to 225 mg orally daily. Patient feels the dose is helping but still having panic attacks or mental break downs. Patient has not scheduled an appointment yet with a mental health therapist. PHQ score of 21 and ALFONSO score of 21 today.      Hypertension     Four week follow up since increase of metoprolol to 50 mg orally daily. Patient checked her blood pressure at home and it was 163/101 today.          Patient Active Problem List   Diagnosis     Dyshidrosis     Irritable Bowel Syndrome     Vitamin D Deficiency     Anxiety     Essential Hypertriglyceridemia     Obstructive Sleep Apnea     Obesity (BMI 35.0-39.9) with comorbidity (H)     Benign essential hypertension     Family history of cardiomyopathy       Current Outpatient Medications   Medication Sig Dispense Refill     aspirin 325 MG EC tablet Take 1 tablet (325 mg total) by mouth daily. 100 tablet 3     cholecalciferol, vitamin D3, (VITAMIN D3) 2,000 unit cap Take 1 tablet by mouth daily.       famotidine (PEPCID) 20 MG tablet Take 1 tablet (20 mg total) by mouth 2 (two) times a day as needed for heartburn. 180 tablet 1     gabapentin (NEURONTIN) 300 MG capsule Take 1 capsule (300 mg total) by mouth every morning AND 2 capsules (600 mg total) at bedtime. 90 capsule 2     hydroCHLOROthiazide (HYDRODIURIL) 25 MG tablet Take 1 tablet (25 mg total) by mouth daily. 90 tablet 1     metoprolol succinate (TOPROL-XL) 100 MG 24 hr tablet Take 1 tablet (100 mg total) by mouth daily. 90 tablet 1     norethindrone (MICRONOR) 0.35 mg tablet TAKE 1 TABLET BY MOUTH EVERY DAY 84 tablet 1     venlafaxine (EFFEXOR-XR) 75 MG 24 hr capsule Take 3 capsules (225 mg total) by  mouth daily. 270 capsule 1     LORazepam (ATIVAN) 0.5 MG tablet Take 1 tablet (0.5 mg total) by mouth every 8 (eight) hours as needed for anxiety. 21 tablet 1     No current facility-administered medications for this visit.        Social History     Tobacco Use   Smoking Status Never Smoker   Smokeless Tobacco Never Used           OBJECTIVE:        No results found for this or any previous visit (from the past 240 hour(s)).    There were no vitals filed for this visit.  Weight: 219 lb 12.8 oz (99.7 kg)        Assessment/Plan:  1. Anxiety  -Stable symptoms at this time.  She is going to call the clinic she was referred to for therapy to see if she can get in in the next couple of weeks.  In the meantime she will continue to monitor her anxiety but I suspect most of her symptoms are appropriate to the situation at this time.  Refill of lorazepam provided to the patient today to use as needed for panic attacks at night.  If the therapy is booked out quite a bit she will plan on following up with me in the next 2 to 3 months.  - LORazepam (ATIVAN) 0.5 MG tablet; Take 1 tablet (0.5 mg total) by mouth every 8 (eight) hours as needed for anxiety.  Dispense: 21 tablet; Refill: 1    2. Essential hypertension  -Blood pressure is elevated today and last time.  Given this and her fast heart rate we will increase her Toprol to 100 mg orally daily and she will check her blood pressure over the next 5 to 7 days a few times.  If this brings it too low she can continue on the 50 mg otherwise I did update her prescription to reflect this as listed below.   - metoprolol succinate (TOPROL-XL) 100 MG 24 hr tablet; Take 1 tablet (100 mg total) by mouth daily.  Dispense: 90 tablet; Refill: 1        Phone call duration:  10 minutes    Isabel Jefferson MD
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
## 2021-06-13 NOTE — PROGRESS NOTES
OV   10/12/2017  Assessment:      1. Encounter for supervision of other normal pregnancy in first trimester  ABO/RH Typing (OP order)    Hepatitis B Surface antigen (HBsAG)    HIV Antigen/Antibody Screening Cascade    HM1(CBC and Differential)    HML Antibody Screen    RPR    Rubella Immune Status (IgG)    Urinalysis Macroscopic    Culture, Urine    TSH    HM1 (CBC with Diff)    Chlamydia trachomatis & Neisseria gonorrhoeae, Amplified Detection    Antibody Identification    Antibody Titer    CANCELED: Chlamydia/gonorrhoeae, URINE   2. Chest pain  Electrocardiogram Perform and Read   3. Anxiety     4. Cervical cancer screening  Gynecologic Cytology (PAP Smear)   5. Immunization due  Influenza, Seasonal Quad, Preservative Free 36+ Months            Plan:         1. Encounter for supervision of other normal pregnancy in first trimester  -Initial labs drawn.  -Prenatal vitamins.  -Problem list reviewed and updated.  -First trimester screening/AFP3 discussed: declined.  Role of ultrasound in pregnancy discussed; fetal survey: results reviewed.  Amniocentesis discussed: not indicated.  General educational information given, including benefits of breast-feeding. See AVS for details.   Follow up in 4 weeks.  - ABO/RH Typing (OP order)  - Hepatitis B Surface antigen (HBsAG)  - HIV Antigen/Antibody Screening Cascade  - HM1(CBC and Differential)  - HML Antibody Screen  - RPR  - Rubella Immune Status (IgG)  - Urinalysis Macroscopic  - Culture, Urine  - TSH  - HM1 (CBC with Diff)  - Chlamydia trachomatis & Neisseria gonorrhoeae, Amplified Detection  - Antibody Identification  - Antibody Titer    2. Chest pain  -EKG today is normal and pain is reproducible with palpation. Likely due to MSK issues. Recommended tylenol, rest, icing and f/u if not improving. Discussed that we did not rule out a PE, but this is quite unlikely with the lack of tachycardia, tachypnea, swelling or other concerns. If worsening though she should go to the  ER.   - Electrocardiogram Perform and Read    3. Anxiety  -Stable with the citalopram. Continue to monitor.     4. Cervical cancer screening  - Gynecologic Cytology (PAP Smear)    5. Immunization due  - Influenza, Seasonal Quad, Preservative Free 36+ Months        Subjective:             Diane Costa is being seen today for her first obstetrical visit.  This is a planned pregnancy. She is at 14-2 gestation. Her obstetrical history is significant for na. Relationship with FOB: spouse, living together. Patient does intend to breast feed. Pregnancy history fully reviewed.        Current symptoms also include: heartburn, some mild morning nausea.  Some acne as well. Sexual Activity: single partner, contraception: none.         Obstetrical history is significant for: Induction for post dates for both previous deliveries.   Patient's last menstrual period was 07/03/2017.. Last period was normal.    She woke up with chest pain at 0400. It's on the left side. Gets worse with movement, but is there all the time. Feels like an ache. There is some increase with exertion. It did hurt to touch this morning, it is better now. She didn't take anything. She has had no heartburn. Shedid have some shortness of breath when she woke up, lasted only a few minutes. She sits at a computer all day, billing. She recalls no injury. She has no fever. It is worse with leaning forward. She went to [place] 9/22-9/30. No swelling.     The following portions of the patient's history were reviewed and updated as appropriate: allergies, current medications, past family history, past medical history, past social history, past surgical history and problem list.    Infection History   - Live with someone with TB or exposed to TB No  - Patient reported with history of genital herpes No  - Rash or viral illness since LMP   No  - Prior GBS infected child    No  - Hepatitis B or C     No  - Gonorrhea      No  - Chlamydia      No  - HPV       No  -  HIV       No  - Syphilis      No  - Other STI's      No  - Other (see comments)    No        Genetic Screening/Teratology Counseling  - Patient's age 35 years or older as of estimated date of delivery  No  - Thalassemia    (Italian, Greek, Mediterranean, or  background) No  - MCV less than 80       No   - Neural tube defects    (meningomyelocele, spina bifida, or anencephaly)  No  - Congenital heart defect      No  - Down syndrome       No  - Robinson-Sachs (Ashkenazi Adventism, [place], [place])   No father's mother's side with Alevism, from [place]  - Canavan Disease (Ashkenazi Adventism)    No  - Familial Dysautonomia (Ashkenazi Adventism)    No  - Sickle cell disease or trait ()     No  - Hemophilia or other blood disorders     No  - Muscular dystrophy       No  - Cystic fibrosis       No  - Raleigh's chorea       No  - Mental retardation/autism       Yes: Patient's paternal and maternal cousin's sons, Father's paternal uncles   (If yes, was the patient tested for fragile X?)     - Other inherited genetic or chromosomal disorders   No  - Maternal metabolic disorders (type 1 diabetes, PKU)  No  - Patient or baby's father had a child with birth defects   No  - Recurrent pregnancy loss, or stillbirth    No  - Medications   (including supplements, vitamins, herbs, OTC drugs)   /illicit/recreational drugs/alcohol since last LMP    list agents and strength, dosing    PNV, citalopram  Any other (see comments)      No      Discussed in today's office visit  HIV and other routine prenatal tests     Yes  Risk Factors Identified by Prenatal history   Yes  Anticipated course of prenatal care    Yes  Nutrition and weight gain counseling: special diet   Yes  Toxoplasmosis precautions (Cats/Raw meat)  Yes  Sexual activity       Yes  Exercise       Yes  Influenza vaccine      Yes  Smoking counseling      Yes  Environmental/work hazards     Yes  Travel        Yes  Tobacco (asked, advise, assess, assist and  arrange) Yes  Alcohol        Yes  Illicit/recreational drugs     Yes  Use of any meds   (including supplements, vitamins, herbs, OTC drugs) Yes  Indications for ultrasound     Yes  Domestic violence      Yes  Seat Belt Use        Yes  Childbirth classes/hospital facilities    Yes  Dental care       Yes  Avoidance of saunas or hot tubs    Yes  Teratogens        Yes  Breast-feeding       Yes  Screening for aneuploidy     Yes      Review of Systems  A 12 point comprehensive review of systems was negative except as noted.          Objective:      /74 (Patient Site: Left Arm, Patient Position: Sitting, Cuff Size: Adult Regular)  Temp (!) 90  F (32.2  C)  Wt 221 lb 1.6 oz (100.3 kg)  LMP 07/03/2017  SpO2 99%  Breastfeeding? No  BMI 33.62 kg/m2  General: alert, pleasant, and no distress  Head: Normocephalic, without obvious abnormality, atraumatic  Eyes: conjunctivae and sclerae normal and pupils equal, round, reactive to   light and accomodation  Ears: normal TM's and external ear canals both ears  Nose: no discharge, no sinus tenderness  Throat: lips, mucosa, and tongue normal; teeth and gums normal  Neck: no adenopathy, supple, symmetrical, trachea midline and thyroid normal  Back: symmetric, ROM normal. No CVA tenderness.  Lungs: clear to auscultation bilaterally  Breasts: normal appearance, no masses or tenderness  Chest: TTP over the left chest wall  Heart:: regular rate and rhythm and no murmurs  Abdomen:  bowel sounds normal, no masses palpable and soft, non-tender  Pelvic: external genitalia normal,  vagina normal without discharge, cervix with some mucous and inflammation no adnexal masses or tenderness, no cervical motion tenderness,   Uterus palpates 14 week size.   Extremities: extremities normal, atraumatic, no cyanosis or edema  Pulses: 2+ and symmetric  Skin: Skin color, texture, turgor normal. No rashes or lesions  Lymph nodes: Cervical, supraclavicular, and axillary nodes normal.  Neurologic:  Grossly normal

## 2021-06-14 NOTE — PROGRESS NOTES
SUBJECTIVE:  Diane Costa is a 32 y.o. female.  She is feeling tired but otherwise well. No fetal movement yet. She has had no headaches, blurry vision, ruq pain, swelling. She does need labs monthly for now.     She has no headaches, blurry vision or RUQ pain. No swelling.   Chief Complaint   Patient presents with     Routine Prenatal Visit     routine ob 16 wks       OBJECTIVE:   /60  Pulse 73  Wt 218 lb 3 oz (99 kg)  LMP 2017  SpO2 99%  BMI 33.18 kg/m2   Please see prenatal data for fundal height and fetal HR.     ASSESSMENT/PLAN:   Diane Costa is a 32 y.o. female  who presents for prenatal check. Patient is doing well.   Updated her antibody screen today per perinatology recommendation, f/u in four weeks.

## 2021-06-14 NOTE — PROGRESS NOTES
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  She is going to work on relaxation techniques through the day pay attention to see if she is clenching and in the meantime start the Flonase nasal spray given what I think is an effusion in the right tympanic membrane.  If things are not resolving over the next several weeks or if they start worsening she will let me know and I can refer back to neurology for further evaluation and assessment.  - fluticasone propionate (FLONASE) 50 mcg/actuation nasal spray  Dispense: 16 g; Refill: 12    Non-healing open wound of right heel  -This looks like a callus likely secondary to some dry skin as well.  She will offload the area with callus patches and use good moisturization with Vaseline or Aquaphor.  If things are not resolving she should let me know and I can refer to podiatry.    Essential hypertension  -Blood pressure is under excellent control.  She does not need lab work today.           BMI:   Estimated body mass index is 33.75 kg/m  as calculated from the following:    Height as of this encounter: 5' 8\" (1.727 m).    Weight as of this encounter: 222 lb (100.7 kg).   The following high BMI interventions were performed this visit: weight monitoring      Return in about 6 months (around 7/27/2021) for Annual physical.    Isabel Jefferson MD  [facility]     Diane Costa is 36 y.o. and presents to clinic today for the following health issues   HPI   Her right ear will pop,her brain will scramble and then her left ear will pop. She will then get some issues with peripheral vision changes and then things go back to normal. Most of them are less than a minute, she has had a few that have taken a few minutes to re-center herself. None of them last a super long time. No obvious " "trigger.     She does have a sore on her right foot as well. IT's on the lateral aspect of her foot, has been there about two months. She does have significant calluses on her feet as well.     Her neuropathy was worse as well over the weekend, it hurt to walk, she couldn't  anything at the time. She is better now.       Review of Systems  Per above      Objective    /88   Pulse 84   Ht 5' 8\" (1.727 m)   Wt 222 lb (100.7 kg)   LMP 01/20/2021 (Exact Date)   SpO2 99%   Breastfeeding No   BMI 33.75 kg/m    Body mass index is 33.75 kg/m .  Physical Exam  GENERAL APPEARANCE: A&A, NAD, well hydrated, well nourished  SKIN:  Normal skin turgor, no lesions/rashes   HEENT: Right ear canal shows a possible effusion with some layering, left tympanic membrane is unremarkable  NECK: No LAD  CV: RRR, no M/G/R   LUNGS: CTAB  EXTREMITY: no edema right foot over the lateral aspect of the posterior foot shows a small crack with surrounding callus, no erythema  NEURO: no gross deficits, normal reflexes at biceps, brachioradialis, and patellar tendons, normal gait  PSYCH: normal affect        "

## 2021-06-14 NOTE — PROGRESS NOTES
SUBJECTIVE:  Diane Costa is a 32 y.o. female.      She has had a deep cough, congestion. No headaches, does have a runny nose and is tired. No fevers. Everyone else at home is sick, her  has a fever. She did try nasal saline. She would rather be sick than do it again. She can do cough drops, honey and lemon.     Baby is moving.She does not have any swelling, but does have a lot of calf cramping. She does have a varicose vein in the back of her right leg.     Chief Complaint   Patient presents with     Routine Prenatal Visit     23 weeks.      Nasal Congestion     Patient has been having nasal congestion, a deep cough and chest pains when coughing. No known fevers.          OBJECTIVE:   /72  Temp 97.5  F (36.4  C) (Oral)   Wt 216 lb 3.2 oz (98.1 kg)  LMP 2017  Breastfeeding? No  BMI 32.87 kg/m2   Please see prenatal data for fundal height and fetal HR.     ASSESSMENT/PLAN:   Diane Costa is a 32 y.o. female  who presents for prenatal check. Patient is doing well.   F/u in one month for one hour blood sugar.   No signs of bacterial infection, discussed symptomatic cares. F/u if not improving.

## 2021-06-14 NOTE — TELEPHONE ENCOUNTER
Refill Approved    Rx renewed per Medication Renewal Policy. Medication was last renewed on 4/20/2020 with 1 refill.  Last office visit: Virtual visit with PCP Dr SAMIA Jefferson on 11/6/2020.    [place], [facility] Triage/Med Refill 1/5/2021     Requested Prescriptions   Pending Prescriptions Disp Refills     hydroCHLOROthiazide (HYDRODIURIL) 25 MG tablet [Pharmacy Med Name: HYDROCHLOROTHIAZIDE 25 MG TAB] 90 tablet 1     Sig: TAKE 1 TABLET BY MOUTH EVERY DAY       Diuretics/Combination Diuretics Refill Protocol  Passed - 1/5/2021 12:45 AM        Passed - Visit with PCP or prescribing provider visit in past 12 months     Last office visit with prescriber/PCP: 10/9/2020 Isabel Jefferson MD OR same dept: 10/9/2020 Isabel Jefferson MD OR same specialty: 10/9/2020 Isabel Jefferson MD  Last physical: 4/12/2019 Last MTM visit: Visit date not found   Next visit within 3 mo: Visit date not found  Next physical within 3 mo: Visit date not found  Prescriber OR PCP: Isabel Jefferson MD  Last diagnosis associated with med order: 1. Chest pain, unspecified type  - hydroCHLOROthiazide (HYDRODIURIL) 25 MG tablet [Pharmacy Med Name: HYDROCHLOROTHIAZIDE 25 MG TAB]; TAKE 1 TABLET BY MOUTH EVERY DAY  Dispense: 90 tablet; Refill: 1    2. Atypical chest pain  - hydroCHLOROthiazide (HYDRODIURIL) 25 MG tablet [Pharmacy Med Name: HYDROCHLOROTHIAZIDE 25 MG TAB]; TAKE 1 TABLET BY MOUTH EVERY DAY  Dispense: 90 tablet; Refill: 1    If protocol passes may refill for 12 months if within 3 months of last provider visit (or a total of 15 months).             Passed - Serum Potassium in past 12 months      Lab Results   Component Value Date    Potassium 3.6 06/10/2020             Passed - Serum Sodium in past 12 months      Lab Results   Component Value Date    Sodium 141 06/10/2020             Passed - Blood pressure on file in past 12 months     BP Readings from Last 1 Encounters:   10/09/20 132/90              Passed - Serum Creatinine in past 12 months      Creatinine   Date Value Ref Range Status   06/10/2020 0.78 0.60 - 1.10 mg/dL Final

## 2021-06-15 NOTE — TELEPHONE ENCOUNTER
Bariatric Task List  Status:  Is patient a candidate for bariatric surgery?:    - TBD based on official height/weight needed. Otherwise may be candidate for [facility] fatty liver disease study   Cleared to schedule surgeon consult?:  not cleared to schedule surgeon consult -     Status:  surgery evaluation in process -     Surgeon: Tio or Lindsey -     Tentative surgery month/year:   -        Insurance: Insurance:  [facility] -     Cigna: PCP Recommendation and Medical Clearance:    -     HP Referral:    -     Other:  Mary Kate will contact pt to discuss insurance requirements -        Patient Info: Initial Weight:  Will get official height/weight on 3/4/2021 -     Date of Initial Weight/Height:  3/4/2021 -     Goal Weight (lbs):    -     Required Weight Loss:  TBD -     Surgery Type:  sleeve gastrectomy -     Multidisciplinary Meeting:    -        Dietician Visits: Structured weight loss required by insurance?:  structured weight loss required - 6 months consecutive between MD and dietitian   Dietician Visit 1:  Completed -    Dietician Visit 2:  Needed -     Dietician Visit 3:  Needed -     Dietician Visit 4:  Needed -     Dietician Visit 5:  Needed -     Dietician Visit 6:  Needed -     Dietician Visit additional:    -     Clearance from dietician to see surgeon?:  Needed - Terri   Dietician Notes:  Mary Kate will contact pt with insurance requirements -        Psychological Evaluation: Psych eval:  Needed - Oswaldo-if surgical candidate   Therapist letter of support:    -     Psychiatrist letter of support:    -     Establish care with therapist:    -     Complete eating disorder evaluation:    -     Letter of clearance from therapist/eating disorder program:    -     Other:    -        Lab Work: Complete Blood Count:    -     Comprehensive Metabolic Panel:    -     Vitamin D:    -     Hgb A1c:  Needed - w/initial labs   PTH:    -     H. pylori:    -     TSH:    -     Nicotine Testing:    -     Other:  Needed -  "Initial labs ordered      Consults/ Clearance: Sleep Medicine:  Completed - Uses CPAP-bring to hospital   Cardiac:    -     Pain:   -     Dental:    -     Endocrine:    -     Gastroenterology:    -     Vascular Medicine:    -     Hematology:    -     Medical Weight Management:   -     Physical Therapy/Exercise:  Needed - Continue active lifestyle aiming for 150-200min/week by May of moderate aerobic activity.   Nephrology:    -     Neurology:    -     Pulmonology:    -     Rheumatology:    -     Other    -     Other    -     Other    -           Testing: UGI:    -     EGD:    -     Other:    -        PCP: Establish care with PCP:    -     Follow up with PCP:    -     PCP letter of support:    -        Smoking: Quit tobacco use (3 months smoke free)?:    -     Quit date:    -        Patient Education:  Information Session:  Completed -     Given \"Making your decision\" handout?:  Given \"\"Making your decision\"\" handout? -     Given support group information?:  support group contact information provided - send email to [email or web address] to get invite to next meeting.   Attended support group?:  Needed -     Support plan in place?:  Completed - Spouse, sister, parents, mother in law   Research consents signed?:    -        Additional Surgery Requirements: Review Coag plan:  Completed - Standard   HgA1c <8:    -     Inpatient pain consult:    -     Final nicotine screen:    -     Dental work complete:    -     Birth control plan:    -     Other Requirements:  Actigall-NOT needed, s/p rosana -     Other Requirements:    -        Final Tasks:  Before surgery online class:  Needed - Pre-op class w/dietitian and nurse   After surgery online class:  Needed - 1 week post surgical class s/dietitian   Nurse visit for weigh-in and information:  Needed - Scheduled for 3/4/2021   Pre-assessment clinic visit with anesthesia team for H&P:  Needed - Pre-op appointment with primary MD within 30 days of surgery   Final labs (Hgb, " plt, T&S, UA):  Needed - Pre-op labs and testing      Notes: To reduce the risk of Effexor withdrawal after surgery that first week, recommend single dose of Prozac 20mg the day prior to surgery. -

## 2021-06-15 NOTE — PATIENT INSTRUCTIONS - HE
Plan:  1. Get official height/weight check with nurse visit at [facility].  BMI over 35 necessary for surgical tool.  2. Intake labs can be done anytime.  3. Dietician/psychology intakes/evaluation/education and clearance.   4. Anticipate likely 6 months of structured weight loss, some of your previous work with Dr. Jefferson could qualify but our coordinator will look into that.  5. Continue good cpap use and bring machine to your procedure.  6. To reduce the risk of Effexor withdrawal after surgery that first week, recommend single dose of Prozac 20mg the day prior to surgery.  7. Continue active lifestyle aiming for 150-200min/week by May of moderate aerobic activity.  8. If you don't meet criteria for surgery we can continue with non surgical options if you like.  9. If in surgery program, attend at least one support group meeting (see below for invite).          Before being submitted for insurance approval, you will need the following:    -Clearance by the Psychologist  -Clearance by the dietitian  -Attend Support Group ([address] [place] [address] at [facility]) 2nd Tuesday of the month 6:30-8pm. Make sure to sign in.  -Routine Health Care Maintenance must be up to date (mammograms yearly after age 40, paps as recommended by your primary provider,  colonoscopy after age 50, earlier if high risk.  -If you are on estrogen, estrogen will be discontinued one month prior to surgery. It may be resumed one month after surgery unless otherwise advised to limit blood clotting risks.  -Pre Operative Lab work-ordered today.    -Structured weight loss visits IF mandated by your insurance carrier or bariatrician.  -Surgeon consult as we get nearer to potential surgery or sooner if you have special considerations that may make your surgery more complex.  -If you have sleep apnea you will need to be using CPAP for at least one month before surgery to reduce your risk of breathing problems after surgery. Make sure to bring your  CPAP or BiPAP to the hospital at the time of surgery.    -You will need to be tobacco free for 2 months before surgery and remain a non-smoker thereafter. If you are currently smoking or have recently quit, your urine will be evaluated for tobacco metabolites pre-operatively.  Smoking is a major risk factor for developing ulceration of your surgical sites and potential severe complications.    -If you are on insulin, you might be referred to an endocrinologist who will manage your insulin during the liquid diet and around the time of surgery. This endocrinologist does not replace your primary provider or your endocrinologist.   -You will need an exercise plan which includes MOVE, ie., walking and MUSCLE, ie.,calisthenics, bands, weight, machines, etc...Maintenance of long term weight loss and quality of life is much improved with regular exercise as the weight comes off.  ______________________________________________________________________    Remember that after your bariatric surgery, vitamin supplementation is a lifelong need.    You will take:    B-12 300-500mcg or higher sublingual (under the tongue) daily or by 1000mcg injection 1-2X/month  D3:  3000- 5000 IUs daily   Multivitamin containing 18mg of iron twice a day  Calcium citrate 1 or 2 daily    To keep your weight off and your vitamin levels up, follow-up is important.    Your labs will be monitored every 6 months for the first two years (every 3 months if you had a duodenal switch) and yearly thereafter.    To avoid ulcers in your stomach avoid tobacco forever, alcohol in excess, caffeine in excess and anti-inflammatories (NSAIDS)  (Aspirin, Ibuprofen, Naproxen and similar medications). Tylenol is fine at usual doses on the box.    If you are told by your physician take Aspirin to protect your heart or for another reason, make sure to take omeprazole or similar medication (protonix, nexium, prevacid) to protect your stomach.    Remember that alcohol  affects you differently after bariatric surgery. If you have even ONE drink DO NOT DRIVE due to rapid absorption and fast spiking of blood alcohol levels within minutes of consumption.     Slowly Increasing your exercise capacity such that 3-6 months after your surgery you're tolerating 150-250 minutes of exercise weekly will help optimize your metabolism and improve the chance of good weight loss maintenance.                  Thank you for your interest in Support Group!  We currently have two options for support group but they are in the virtual format only at this time.  Both groups are using Microsoft Teams for their platform and you can access it through the web or an david that can be downloaded to a smart phone if you have one.      The Pre- and Post-op Connections Group is on the second Tuesday of the month from 6:30-8pm and is hosted by Oswaldo Mack, PhD.  If you are interested in this group, you will need to email him at [email or web address] each month and then he will in turn send you the invitation to join.      We also have a Post-op focused Connections Group the 4th Wednesday of the month from 11am-12pm that is mostly geared toward post-operative patients who are past three months post-op.  This group is hosted by SOPHIE Arenas, CBN, CIC and you can email her to join the group at [email or web address] each month and she will send you an invite similar to Oswaldo's group.  If you decide you would like to be a regular attender at this group, we can add you to an automatic invitation list of people.    You can see more information about available support groups that the [facility] offers to our patients as well by following the link:    [email or web address]      Please let us know if you have any questions.     Thank you,   [employer] [employer] [facility]      Online Seminar:  [email or web address]

## 2021-06-15 NOTE — PROGRESS NOTES
Here for nurse visit to check official height and weight and get photos and measurements. Chart routed to  for next steps.    Venice Lane RN, CBN  [facility]  P [phone]  F [phone]

## 2021-06-15 NOTE — PROGRESS NOTES
SUBJECTIVE:  Diane Costa is a 33 y.o. female.  She does have a rash in the inner antecubital fossa. Not itchy. Was hard this morning, now better.     Baby is moving well. She has had no headaches, blurry vision, swelling.   Chief Complaint   Patient presents with     Routine Prenatal Visit     30 weeks.      Rash     Patient has a red patch of skin on the inner portion of her elbow, the area is tender to the touch. Non-itchy.        OBJECTIVE:   /64  Wt 222 lb (100.7 kg)  LMP 2017  Breastfeeding? No  BMI 33.75 kg/m2   Please see prenatal data for fundal height and fetal HR.     ASSESSMENT/PLAN:   Diane Costa is a 33 y.o. female  who presents for prenatal check. Patient is doing well.   As she is measuring somewhat big I did get an US to look at fetal growth ordered today. F/u in two weeks.   Labs updated today for her anti E antibody

## 2021-06-15 NOTE — PROGRESS NOTES
SUBJECTIVE:  Diane Costa is a 32 y.o. female.  She is doing well. Last week did have a period where she didn't feel baby as much. She did go home because she wasn't feeling well, she slept all day. She did have a scan that day, having more normal movement. Baby is moving normally now.     She denies headaches, blurry vision, ruq pain, edema. No cramping.     Chief Complaint   Patient presents with     Routine Prenatal Visit     27 weeks.        OBJECTIVE:   /74 (Patient Site: Left Arm, Patient Position: Sitting, Cuff Size: Adult Large)  Wt 217 lb 1.6 oz (98.5 kg)  LMP 2017  Breastfeeding? No  BMI 33.01 kg/m2   Please see prenatal data for fundal height and fetal HR.     ASSESSMENT/PLAN:   Diane Costa is a 32 y.o. female  who presents for prenatal check. Patient is doing well.   F/u in 3 weeks.

## 2021-06-15 NOTE — PROGRESS NOTES
Diane Costa is 36 y.o.  female who presents for a billable video visit today.    How would you like to obtain your AVS? MyChart.  If dropped from the video visit, the video invitation should be resent by: Text to cell phone: [phone]  Will anyone else be joining your video visit? No    See my note.  Tahir Yeh MD
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
## 2021-06-16 NOTE — PROGRESS NOTES
SUBJECTIVE:  Diane Costa is a 33 y.o. female.  Generally feeling ok. She has been generally feeling well. She does not have headaches, blurry vision, ruq pain. She has had no swelling. She was in the hospital with decreased fetal movement but things are normal now. Placenta is anterior.     Her blood pressure is mildly elevated today.     Chief Complaint   Patient presents with     Routine Prenatal Visit     37 weeks.        OBJECTIVE:   /90 (Patient Site: Left Arm, Patient Position: Sitting, Cuff Size: Adult Large)  Wt (!) 228 lb 1.6 oz (103.5 kg)  LMP 2017  Breastfeeding? No  BMI 34.68 kg/m2   Please see prenatal data for fundal height and fetal HR.     Repeat check 144/92    ASSESSMENT/PLAN:   Diane Costa is a 33 y.o. female  who presents for prenatal check. Patient is doing well.   Will follow up next week for a BP check and labs, will watch for signs of pre-eclampsia and follow up sooner if needed.

## 2021-06-16 NOTE — TELEPHONE ENCOUNTER
RN cannot approve Refill Request    RN can NOT refill this medication med is not covered by policy/route to provider. Last office visit: 1/27/2021 Isabel Jefferson MD Last Physical: 4/12/2019 Last MTM visit: Visit date not found Last visit same specialty: 1/27/2021 Isabel Jefferson MD.  Next visit within 3 mo: Visit date not found  Next physical within 3 mo: Visit date not found      Tammie F Severson, [facility] Triage/Med Refill 3/18/2021    Requested Prescriptions   Pending Prescriptions Disp Refills     norethindrone (MICRONOR) 0.35 mg tablet [Pharmacy Med Name: NORETHINDRONE 0.35 MG TABLET] 84 tablet 1     Sig: TAKE 1 TABLET BY MOUTH EVERY DAY       There is no refill protocol information for this order

## 2021-06-16 NOTE — PROGRESS NOTES
SUBJECTIVE:  Diane Costa is a 33 y.o. female.  She has a rash on the right inner ankle. She will get swelling and redness at times over this area. It is itchy as well. She does have a bad varicose vein on that side as well.     She is worried that she is not gaining weight with this pregnancy. Baby is growing. She is eating when hungry. She is a snacker. She is not a meal eater. When she had her middle child she only gained 3 pounds total.     Baby Is moving well. No headaches, blurry vision, ruq pain. No swelling. She does feel some contractions over the top of her uterus.   Chief Complaint   Patient presents with     Routine Prenatal Visit     34 weeks.      Rash     Patient has a rash for four days on the lower part of her right leg, super itchy and warm to the touch. The area of her leg has been red and swollen off and on for one month.       OBJECTIVE:   /74 (Patient Site: Left Arm, Patient Position: Sitting, Cuff Size: Adult Large)  Wt (!) 224 lb (101.6 kg)  LMP 2017  Breastfeeding? No  BMI 34.06 kg/m2   Please see prenatal data for fundal height and fetal HR.   Skin with signs of dryness and excoriation on the medial right ankle    ASSESSMENT/PLAN:   Diane Costa is a 33 y.o. female  who presents for prenatal check. Patient is doing well.   US for position as I do think that baby is still transverse. F/u in two weeks.     Will try cortisone and if not improving will let me know and we can treat for folliculitis.

## 2021-06-16 NOTE — TELEPHONE ENCOUNTER
Called patient when she was not connected for initial video nutrition appointment. Sent SMS text message video link to connect. Left voicemail for patient to connect or to call the clinic (phone number provided) to reschedule.    Terri Freed RD, LD

## 2021-06-16 NOTE — PROGRESS NOTES
"   Diane Costa is a 36 y.o. female who is being evaluated via a billable video visit.       How would you like to obtain your AVS? MyChart.  If dropped from the video visit, the video invitation should be resent by: Send to e-mail at: [email or web address]  Will anyone else be joining your video visit? No     Video Start Time: 3:33 PM      Medical  Weight Loss Initial Diet Evaluation  Diane is presenting today for a new weight management nutrition consultation. Pt has had an initial appointment with Dr. Yeh.  Weight loss medication: She is interested in learning about medication options for weight loss.  Nutrition Assessment:   Anthropometrics:  Pt's Initial Weight: 223 lbs  Weight: (!) 223 lb (101.2 kg)  Weight loss from initial: 0  % Weight loss: 0 %    BMI: There is no height or weight on file to calculate BMI.   Patient weight not recorded  Estimated RMR (Long-St Jeor equation):  1744 kcals x 1.2 (sedentary) = 2093 kcals (for weight maintenance)  Recommended Protein Intake:  grams of protein/day  Medical History:  Patient Active Problem List   Diagnosis     Dyshidrosis     Irritable Bowel Syndrome     Vitamin D Deficiency     Anxiety     Essential Hypertriglyceridemia     Obstructive Sleep Apnea     Obesity (BMI 35.0-39.9) with comorbidity (H)     Benign essential hypertension     Family history of cardiomyopathy     Paresthesia      Diabetes: No  Hemoglobin A1c   Date/Time Value Ref Range Status   04/25/2017 07:40 AM 5.1 3.5 - 6.0 % Final     Nutrition History:   Food allergies/intolerances/cultural or religous food customs: \"Picky Eater\", she has non specified IBS, garlic upsets her stomach, and things like olive oil sometimes upsets her stomach.   Weight loss history: Watching portions or calories, Exercise, Weight Watchers, Atkins type diet (low carb/high protein), Slimfast, OTC Medications, Prescription Medications, Physician directed program  Vitamins/Mineral Supplementation: " "Vitamin D  Dietary Recall:  Breakfast: 10 am  Lunch: Orange   Dinner: 6-8 pm Spaghetti with meat, salad  Tolerates: fruits and vegetable well, but doesn't like a lot vegetables, PB and J sandwiches, chicken, fish, pork, salads, cheese (but doesn't care for cheese), greek yogurt and granola, pepper, carrots, cucumber, and broccoli  Doesn't tolerate: a lot of \"British\" type foods, red meat  Eating out: 3-4x per week  Beverages:   Iced coffee: makes cold brew and adds almond milk creamer, about 16-24 oz  Water: 1/2 gallon per day  Used to drink soda, but cold turkey in November   Exercise:   She has set a walking goal of walking 6 days per week.   Nutrition Diagnosis (PES statement):   Overweight/Obesity (NC 3.3) related to excessive calorie intake as evidenced by BMI 34.4  Nutrition Intervention:  1. Food and/or Nutrient Delivery   a. Placed emphasis on importance of developing a healthy meal routine, aiming for 3 meals a day.  2. Nutrition Education   a. Discussed with patient how to build a meal: the importance of including a lean/low fat protein at each meal, include a source of vegetables at a minimum of lunch and dinner and limiting carbohydrate intake..  b. Educated on sources of lean protein, portion sizes, the amount of grams found in each source. Recommend patient to aim for 20-30g protein at each meal.  c. Discussed the importance of adequate hydration, with emphasis on drinking 64oz of water or zero calorie beverages per day.  3. Nutrition Counseling   a. Encouraged importance of developing routine exercise for health benefits and weight loss.  Goals established by patient:   1. Aim to eat 3 times per day, include a protein source at each meal  2. Start walking routine, aiming for 6 days per week.  Assessment/Plan:    Pt will follow up in 1 month(s) with bariatrician and 1.5 month(s) with dietitian.     Video-Visit Details    Type of service:  Video Visit    Video End Time (time video stopped): 4:00 " pm  Originating Location (pt. Location): Home    Distant Location (provider location):  [facility] [place]     Platform used for Video Visit: Fer Freed RD, LD

## 2021-06-16 NOTE — PROGRESS NOTES
SUBJECTIVE:  Diane Costa is a 33 y.o. female.  She is generally feeling well. She does not have headaches, blurry vision, ruq pain, swelling. Baby is moving well.     She has no other concerns today.   Chief Complaint   Patient presents with     Routine Prenatal Visit     38 weeks.        OBJECTIVE:   /90  Wt (!) 226 lb 11.2 oz (102.8 kg)  LMP 2017  Breastfeeding? No  BMI 34.47 kg/m2   Please see prenatal data for fundal height and fetal HR.     ASSESSMENT/PLAN:   Diane Cosat is a 33 y.o. female  who presents for prenatal check.   Patient with gestational hypertension, discussed with in house OB who agreed that she should have IOL tonight  This was set up, will do PIH labs when she gets in as she does have more protein today.

## 2021-06-16 NOTE — PROGRESS NOTES
SUBJECTIVE:  Diane Costa is a 33 y.o. female.  She is generally feeling ok. She has had no headaches, blurry vision, swelling, ruq pain. Baby is moving ok. She has had some contractions.     She was fighting something off last week one day. She didn't have any fevers.   Chief Complaint   Patient presents with     Routine Prenatal Visit     36 weeks.     [headaches, vision changes, edema, abdominal pain, cramping, BH] [fluid leaking, discharge]    OBJECTIVE:   /90 (Patient Site: Left Arm, Patient Position: Sitting, Cuff Size: Adult Large)  Wt (!) 225 lb 3.2 oz (102.2 kg)  LMP 2017  Breastfeeding? No  BMI 34.24 kg/m2   Please see prenatal data for fundal height and fetal HR.     ASSESSMENT/PLAN:   Diane Costa is a 33 y.o. female  who presents for prenatal check.  Will follow up tomorrow with a repeat blood pressure check.   Updating labs today as well

## 2021-06-16 NOTE — PROGRESS NOTES
SUBJECTIVE:  Diane Costa is a 33 y.o. female.  She is still feeling tired. Taking citalopram during the day. Some cough. She has had some runny/stuffy nose and headaches. She does stare at a computer all day.     She does have pain over her RUQ at times with contractions. She does get a pain in her left hip bone if she is walking for 5-10 minutes. It will tighten at times. It is not constant and few and far between. No swelling. Baby is moving well.   Chief Complaint   Patient presents with     Routine Prenatal Visit       OBJECTIVE:   /80  Pulse 76  Wt (!) 224 lb (101.6 kg)  LMP 2017  BMI 34.06 kg/m2   Please see prenatal data for fundal height and fetal HR.     CV: RRR, nl s1, s2, no murmurs  Lungs: clear bilaterally  Pharynx WNL, TMs clear    ASSESSMENT/PLAN:   Diane Costa is a 33 y.o. female  who presents for prenatal check. Patient is doing well.   Exam is normal today, will watch symptoms for now, assume resolving viral illness  Will try taking citalopram at night as well.

## 2021-06-16 NOTE — TELEPHONE ENCOUNTER
Refill Approved    Rx renewed per Medication Renewal Policy. Medication was last renewed on 11/6/20, last OV 1/27/21.    Adelaida Evans, [facility] Triage/Med Refill 4/25/2021     Requested Prescriptions   Pending Prescriptions Disp Refills     metoprolol succinate (TOPROL-XL) 100 MG 24 hr tablet [Pharmacy Med Name: METOPROLOL SUCC  MG TAB] 90 tablet 1     Sig: TAKE 1 TABLET BY MOUTH EVERY DAY       Beta-Blockers Refill Protocol Passed - 4/24/2021  9:41 AM        Passed - PCP or prescribing provider visit in past 12 months or next 3 months     Last office visit with prescriber/PCP: 1/27/2021 Isabel Jefferson MD OR same dept: 1/27/2021 Isabel Jefferson MD OR same specialty: 1/27/2021 Isabel Jefferson MD  Last physical: 4/12/2019 Last MTM visit: Visit date not found   Next visit within 3 mo: Visit date not found  Next physical within 3 mo: Visit date not found  Prescriber OR PCP: Isabel Jefferson MD  Last diagnosis associated with med order: 1. Essential hypertension  - metoprolol succinate (TOPROL-XL) 100 MG 24 hr tablet [Pharmacy Med Name: METOPROLOL SUCC  MG TAB]; TAKE 1 TABLET BY MOUTH EVERY DAY  Dispense: 90 tablet; Refill: 1    If protocol passes may refill for 12 months if within 3 months of last provider visit (or a total of 15 months).             Passed - Blood pressure filed in past 12 months     BP Readings from Last 1 Encounters:   01/27/21 118/88

## 2021-06-16 NOTE — TELEPHONE ENCOUNTER
Refill Approved    Rx renewed per Medication Renewal Policy. Medication was last renewed on 7/24/20.    Miguel Angel Delatorre, [facility] Triage/Med Refill 4/5/2021     Requested Prescriptions   Pending Prescriptions Disp Refills     gabapentin (NEURONTIN) 300 MG capsule [Pharmacy Med Name: GABAPENTIN 300 MG CAPSULE] 90 capsule 2     Sig: TAKE 1 CAPSULE (300 MG TOTAL) BY MOUTH EVERY MORNING AND 2 CAPSULES (600 MG TOTAL) AT BEDTIME.       Gabapentin/Levetiracetam/Tiagabine Refill Protocol  Passed - 4/4/2021  9:39 AM        Passed - PCP or prescribing provider visit in past 12 months or next 3 months     Last office visit with prescriber/PCP: 1/27/2021 Isabel Jefferson MD OR same dept: 1/27/2021 Isabel Jefferson MD OR same specialty: 1/27/2021 Isabel Jefferson MD  Last physical: 4/12/2019 Last MTM visit: Visit date not found   Next visit within 3 mo: Visit date not found  Next physical within 3 mo: Visit date not found  Prescriber OR PCP: Isabel Jefferson MD  Last diagnosis associated with med order: 1. Tingling in extremities  - gabapentin (NEURONTIN) 300 MG capsule [Pharmacy Med Name: GABAPENTIN 300 MG CAPSULE]; Take 1 capsule (300 mg total) by mouth every morning AND 2 capsules (600 mg total) at bedtime.  Dispense: 90 capsule; Refill: 2    If protocol passes may refill for 12 months if within 3 months of last provider visit (or a total of 15 months).

## 2021-06-17 NOTE — PROGRESS NOTES
"   Diane Costa is a 36 y.o. female who is being evaluated via a billable video visit.       How would you like to obtain your AVS? MyChart.  If dropped from the video visit, the video invitation should be resent by: Send to e-mail at: [email or web address]  Will anyone else be joining your video visit? No     Video Start Time: 3:31 PM      Medical  Weight Loss Follow-Up Diet Evaluation  Assessment:  Diane is presenting today for a follow up weight management nutrition consultation. Pt has had an initial appointment with Dr. Yeh.  Weight loss medication: Naltrexone to complement previous Bupropion.  Pt's Initial Weight: 223 lbs  Weight: (!) 233 lb (105.7 kg)  Weight loss from initial: -10  % Weight loss: -4.48 %  Weight (Patient Reported): 233 lb (105.7 kg)  Height (Patient Reported): 5' 8\" (1.727 m)  BMI (Based on Pt Reported Ht/Wt): 35.43    BMI: There is no height or weight on file to calculate BMI.  Ideal body weight: 63.9 kg (140 lb 14 oz)  Adjusted ideal body weight: 80.6 kg (177 lb 11.6 oz)    Estimated RMR (Hanson-St Jeor equation):  1744 kcals x 1.2 (sedentary) = 2093 kcals (for weight maintenance)  Recommended Protein Intake:  grams of protein/day  Patient Active Problem List:  Patient Active Problem List   Diagnosis     Dyshidrosis     Irritable Bowel Syndrome     Vitamin D Deficiency     Anxiety     Essential Hypertriglyceridemia     Obstructive Sleep Apnea     Obesity (BMI 35.0-39.9) with comorbidity (H)     Benign essential hypertension     Family history of cardiomyopathy     Paresthesia     Diabetes: No    Progress on goals from last visit:   1. Aim to eat 3 times per day, include a protein source at each meal - she estimates that she is getting around 60-70 g of protein per day  2. Start walking routine, aiming for 6 days per week - not met, her knee has been bothering her    Dietary Recall:  Breakfast: Eggs OR Chicken salad  Lunch: Salad (mixed greens, apple/cranberry, " vanessa cheese, chicken)  Dinner: Stir Salter or Rice bowl   Beverages:   Iced coffee: makes cold brew and adds almond milk creamer, about 16-24 oz  Water: 1/2 gallon per day  Used to drink soda, but cold turkey in November   Exercise:   She has set a walking goal of walking 6 days per week. Has not been good the last few weeks due to a bad knee.   Nutrition Diagnosis:    Overweight/Obesity (NC 3.3) related to excessive calorie intake as evidenced by BMI 34.4  Intervention:  1. Food and/or nutrient delivery: 1400 kcals per day,  g protein  2. Nutrition counseling: Discussed progress with current goals, barriers, and plan moving forward    Monitoring/Evaluation:    Goals:  1. Aim to eat 3 times per day, include a protein source at each meal ( g per day)  2. Aim for 150 minutes of moderate physical activity per week    Patient to follow up in 1 months(s) with bariatrician and 2 month(s) with RD    Video-Visit Details    Type of service:  Video Visit    Video End Time (time video stopped): 3:50 PM  Originating Location (pt. Location): Home    Distant Location (provider location):  [facility] [place]     Platform used for Video Visit: [employer]

## 2021-06-17 NOTE — PATIENT INSTRUCTIONS - HE
"Patient Instructions by Octavio Candelaria MD at 7/25/2019  4:40 PM     Author: Octavio Candelaria MD Service: -- Author Type: Physician    Filed: 7/25/2019  5:48 PM Encounter Date: 7/25/2019 Status: Addendum    : Octavio Candelaria MD (Physician)    Related Notes: Original Note by Octavio Candelaria MD (Physician) filed at 7/25/2019  5:47 PM       - Your neurologic examination is normal.   - Your symptoms could be concerning for developing shingles, Bell's Palsy, or trigeminal neuralgia.   - Seek urgent medical attention if you develop redness / blisters on the right side of your face or weakness / droop on the right side of your face.   - Seek emergent medical attention if you develop weakness in your arm / leg, difficulty speaking, or other concerning symptoms.   - Checking these labs: blood cell counts, electrolytes, kidney function, liver function, thyroid function, and Lyme screen.       Patient Education     Paraesthesias  Paraesthesia is a burning or prickling sensation that is sometimes felt in the hands, arms, legs or feet. It can also occur in other parts of the body. It can also feel like tingling or numbness, skin crawling, or itching. The feeling is not comfortable, but it is not painful. (The \"pins and needles\" feeling that happens when a foot or hand \"falls asleep\" is a temporary paraesthesia.)  Paraesthesias that last or come and go may be caused by medical issues that need to be treated. These include stroke, a bulging disk pressing on a nerve, a trapped nerve, vitamin deficiencies, or even certain medicines.  Tests are often done. These tests may include blood tests, X-ray, CT (computerized tomography) scan, or a muscle test (electromyography). Depending on the cause, treatment may include physical therapy.  Home care    Tell the healthcare provider about all medicines you take. This includes prescription and over-the-counter medicines, vitamins, and herbs. Ask if any of the medicines " may be causing your problems. Do not make any changes to prescription medicines without talking to your healthcare provider first.    You may be prescribed medicines to help relieve the tingling feeling or for pain. Take all medicines as directed.    A numb hand or foot may be more prone to injury. To help protect it:  ? Always use oven mitts.  ? Test water with an unaffected hand or foot.  ? Use caution when trimming nails. File sharp areas.  ? Wear shoes that fit well to avoid pressure points, blisters, and ulcers.  ? Inspect your hands and feet carefully (including the soles of your feet and between your toes) at least once a week. If you see red areas, sores, or other problems, tell your healthcare provider.  Follow-up care  Follow up with your doctor or as advised by our staff. You may need further testing or evaluation.  When to seek medical advice  Call your healthcare provider right away if any of the following occur:    Numbness or weakness of the face, one arm, or one leg    Slurred speech, confusion, trouble speaking, walking, or seeing    Severe headache, fainting spell, dizziness, or seizure    Chest, arm, neck, or upper back pain    Loss of bladder or bowel control    Open wound with redness, swelling, or pus  Date Last Reviewed: 9/25/2015 2000-2017 [employer]. [address] [place] [address]. All rights reserved. This information is not intended as a substitute for professional medical care. Always follow your healthcare professional's instructions.           Patient Education     Glendale Palsy  Glendale palsy is a nerve disorder that usually happens suddenly and without warning. This condition happens when a nerve that controls facial movement is damaged. Nerve damage can happen for many reasons. But most cases of Glendale palsy are probably caused by a virus.  Symptoms of Glendale palsy  Here are signs of the disorder:     Mild weakness to total paralysis of one side of your  face    Drooping mouth, drooling on one side of mouth    Trouble closing one eye    Noises seeming louder than usual    Change in your sense of taste  When to go to the emergency room (ER)  There are conditions, such as stroke, that may look like Bell's palsy and are medical emergencies. Therefore, you should seek emergent medical care if you notice facial weakness or drooping. Although Kellogg palsy can be alarming, its rarely serious. Many people begin to improve in about 2 weeks, even without treatment. It is important to be seen as soon as possible. Most research shows that treatment with beneficial results was received within the first few days of symptoms.   Treatment  To treat Kellogg palsy, you may be given steroid medicines. This helps reduce swelling of the affected nerve. In some cases, your healthcare provider may prescribe an antiviral medicine. Your open eye may be covered with a patch to prevent it from drying out. You also may need to use eye drops and ointments for a time. Your healthcare provider will discuss follow-up care with you, including the possible need for further treatment to help your facial muscles return to normal.  Date Last Reviewed: 10/7/2015    8703-2778 [employer]. [address] [place] [address]. All rights reserved. This information is not intended as a substitute for professional medical care. Always follow your healthcare professional's instructions.           Patient Education     Shingles  Shingles is a viral infection caused by the same virus as chicken pox. Anyone who has had chicken pox may get shingles later in life. The virus stays in the body, but remains dormant (asleep). Shingles often occurs in older persons or persons with lowered immunity. But it can affect anyone at any age.  Shingles starts as a tingling patch of skin on one side of the body. Small, painful blisters may then appear. The rash does not spread to the rest of the body.  Exposure  to shingles cannot cause shingles. However, it can cause chicken pox in anyone who has not had chicken pox or has not been vaccinated. The contagious period ends when all blisters have crusted over (generally about 2 weeks after the illness begins).  After the blisters heal, the affected skin may be sensitive or painful for months (neuralgia). This often gradually goes away.  A shingles vaccine is available. This can help prevent shingles or make it less painful. It is generally recommended for adults over the age of 60 who have had chicken pox in the past, but who have never had shingles. Adults over 60 who have had neither chicken pox nor shingles can prevent both diseases with the chicken pox vaccine. Ask your healthcare provider about these vaccines.  Home care    Medicines may be prescribed to help relieve pain. Take these medicines as directed. Ask your healthcare provider or pharmacist before using over-the-counter medicines for helping treat pain and itching.    In certain cases, antiviral medicines may be prescribed to reduce pain, shorten the illness, and prevent neuralgia. Take these medicines as directed.    Compresses made from a solution of cool water mixed with cornstarch or baking soda may help relieve pain and itching.     Gently wash skin daily with soap and water to help prevent infection.  Be certain to rinse off all of the soap, which can be irritating.    Trim fingernails and try not to scratch. Scratching the sores may leave scars.    Stay home from work or school until all blisters have formed a crust and you are no longer contagious.  Follow-up care  Follow up with your healthcare provider or as directed by our staff.  When to seek medical advice    Fever of 100.4 F (38 C) or higher, or as directed by your healthcare provider    Affected skin is on the face or neck and any of the following occur:  ? Headache  ? Eye pain  ? Changes in vision  ? Sores near the eye  ? Weakness of facial  muscles    Pain, redness, or swelling of a joint    Signs of skin infection: colored drainage from the sores, warmth, increasing redness, or increasing pain  Date Last Reviewed: 9/25/2015 2000-2017 [employer]. [address] [place] [address], [place] [address]. All rights reserved. This information is not intended as a substitute for professional medical care. Always follow your healthcare professional's instructions.           Patient Education     Trigeminal Neuralgia  You have trigeminal neuralgia. This is pain caused by irritation of the trigeminal nerve on your face. Symptoms include sudden, sharp pain in your head or face. It may feel like an electric shock. It can last for several seconds or minutes. It usually happens on only 1 side of your face. Pain may be triggered by things like moving your jaw or a touch on the skin of your face. The pain may be caused by something irritating the trigeminal nerve, such as a blood vessel pressing against it. But the exact cause of this problem often isnt known. Although it can be quite painful, the condition isnt dangerous.  Trigeminal neuralgia is often treated with medicines. These include anti-seizure medicines or antidepressants. Certain other treatments may also help. In some cases, you may need surgery.    Home care  Your healthcare provider may prescribe medicines to help relieve and prevent pain. Take all medicines as directed. Please note that it may take several changes in dose and medicines before the right combination is found that controls the pain.  General care:    Plan to rest at home today.    Avoid any specific activities that seem to trigger the pain.    Over the next few weeks, keep a pain diary. Write down when your symptoms happen and how they feel. Certain activities such as touching your face, chewing, talking, or brushing your teeth may bring on the pain. Cold air can also trigger the pain. Make sure you write down any triggers and  discuss these with your healthcare provider. This will help guide treatment.  Follow-up care  Follow up with your healthcare provider, or as advised. If you were referred to a neurologist, be sure to make an appointment.  For more information on your condition, visit:    [employer] www.fpa-support.org  When to seek medical advice  Call your healthcare provider right away if any of these occur:    Fever of 100.4 F (38 C) or higher, or as advised    Headache with very stiff neck    You arent able to keep liquids down (repeated vomiting)    Extreme drowsiness or confusion    Dizziness or fainting    A new feeling of weakness or numbness or tingling in your arm, leg, or face    Difficulty speaking or seeing  Date Last Reviewed: 8/1/2016 2000-2017 [employer]. [address] [place] [address]. All rights reserved. This information is not intended as a substitute for professional medical care. Always follow your healthcare professional's instructions.           Patient Education     Lyme Disease  Lyme disease is caused by bacteria. The infection is most often passed during the bite of a deer tick. The tick is very small, so many people with Lyme disease do not know they have been bitten. Tests for Lyme disease are not always accurate early in the disease. If the disease is suspected, treatment may begin before testing confirms the infection. A long course of antibiotics is the standard treatment.  If untreated, Lyme disease can worsen and full-body symptoms can develop          Early local symptoms may appear within a few days to a month after the tick bite. These symptoms may include a round, red rash that looks like a bull's-eye target with darker outer ring and a darker center. There may fever, chills, fatigue, body aches, and headache. In time, the rash goes away, even without treatment. That doesn't mean the infection has gone away, however. In some cases, early local symptoms  never develop.    Early disseminated symptoms may appear weeks to months after the bite. These can include muscle aches, fatigue, fever, headache, stiff neck, and joint pain and swelling.    Late-stage symptoms include weakness in an arm, leg or one side of the face, headache, fever, and numbness and tingling in the arms or legs, confusion, and memory loss.  Testing is done for the presence of the bacteria. When the infection is treated early, it can be cured. In some cases, a second or third course of antibiotics may be needed. Be sure to follow your healthcare providers directions about treatment.  Home care  If oral antibiotics have been prescribed, take them exactly as directed until they are completely gone. Do not stop taking them until you have taken the full course or your healthcare provider has told you to stop.  Ask your healthcare provider about taking over-the-counter medicines to control symptoms such as aches and fever.  Follow-up care  Follow up with your healthcare provider as advised. Be sure to return for follow-up testing as directed to be sure the infection has been treated.  When to seek medical advice  Call your healthcare provider right away if any of the following occur:    Current symptoms get worse    Unexplained fever, neck pain or stiffness, or headache    Arm, leg or facial weakness    Joint pain or swelling    Numbness and tingling in the arms or legs    Confusion or memory loss    Irregular or rapid heartbeat  Date Last Reviewed: 9/25/2015 2000-2017 [employer] [address] [place] [address]. All rights reserved. This information is not intended as a substitute for professional medical care. Always follow your healthcare professional's instructions.

## 2021-06-17 NOTE — ANESTHESIA PROCEDURE NOTES
Epidural Block    Patient location during procedure: OB  Time Called: 3/29/2018 12:48 PM  Reason for Block:labor epidural  Staffing:  Performing  Anesthesiologist: CARMITA MORENO  Preanesthetic Checklist  Completed: patient identified, risks, benefits, and alternatives discussed, timeout performed, consent obtained, at patient's request, airway assessed, oxygen available, suction available, emergency drugs available and hand hygiene performed  Procedure  Patient position: sitting  Prep: ChloraPrep and site prepped and draped  Patient monitoring: blood pressure, heart rate and continuous pulse oximetry  Approach: midline  Location: L3-L4  Injection technique: MOLINA saline  Number of Attempts:1  Needle  Needle type: Sajan   Needle gauge: 18 G     Catheter in Space: 4 (5 cm to EDS)  Assessment  Sensory level:  No complications

## 2021-06-17 NOTE — ANESTHESIA PREPROCEDURE EVALUATION
Anesthesia Evaluation      Patient summary reviewed   No history of anesthetic complications     Airway   Mallampati: II  Neck ROM: full   Pulmonary    (+) sleep apnea on CPAP, moderate, a smoker                         Cardiovascular   (+) hypertension well controlled, , hypercholesterolemia,      Neuro/Psych    (+) depression, anxiety/panic attacks,     Endo/Other    (+) obesity, pregnant     Comments: Vitamin D Deficiency.    GI/Hepatic/Renal      Comments: IBS     Other findings: Normal labs.      Dental                         Anesthesia Plan  Planned anesthetic: epidural  LE.  ASA 3     Anesthetic plan and risks discussed with: patient  Anesthesia plan special considerations: antiemetics,   Post-op plan: routine recovery

## 2021-06-17 NOTE — PATIENT INSTRUCTIONS - HE
"  Plan:  1. Ramping up with non surgical weight loss given hypertension, sleep apnea, fatty liver disease history and some medication induced weight gain in the past with history of vertebral artery dissection.   2. Food intake goal of 1400kcal/day given relatively sedentary lifestyle. Aiming for 80-100g of protein/day about 25-30g/meal would be ideal. Follow up as planned with your dietician visit next week.  3. Mindfulness. Tracking intake/timing and sense of appetite control vs hunger based on the routine will be very helpful.   4. Exercise is limited by child rearing but 10min walk daily is a good place to start to work up to the 150min/week goal for all adults in [place]. Videos/machines/yoga/swimming/etc are all good options. Make a plan for each season on activities you enjoy.  5. Medication support: start naltrexone to complement your previous bupropion and offset some of the loss of satiety from your gabapentin and metoprolol effects. Start HALF a tablet with supper for a week and then, if tolerating that dose, increase to half a tablet with breakfast and supper (take with food).   6. I'll send a PA for Saxenda as well. If covered, start 0.6mg injected daily and after 7-10 days increase to 1.2mg injected daily. We'll look to ramp up every 1-2 weeks until you reach the maximum tolerated dose or hit the 3mg/day limit. Stop if severe nausea/vomiting/abdominal pain due to risk of pancreatitis and let me know. Reduce dose if upset stomach/nausea is too strong a feeling (OK to take a break for 1-2 days if needed).   7. Recheck with me in one month.  8. Labs can be done anytime (orders in place).       What makes a person succeed with dramatic and sustained weight loss?    It's being at the right point in your life where you feel the need to lose the weight, not because anyone told you so but because of a voice inside of you that says, \"I am ready for this\".  You're now at a point where you may be feeling anxious, " "irritable and when you look in the mirror you do not recognize the person looking back.  Your healthy self is buried somewhere in that reflexion and you want to free it again.  This is the sort of motivation that leads to success, and it comes from you.    Because the only person that can lose that extra weight is you, I like my patients to focus on the mindset of being in Weight Loss Season.  This gives you permission to make the changes necessary to be consistent with the diet/activity and behavior changes that lead to successful, healthy weight loss.  Nearly any diet plan can work for weight loss, but keeping it healthy and nutrient based to prevent deficiencies/hair loss/fatigue or irritability is vital.  If you have a plan you want to try, we'll work with you to make sure no adjustments are needed to keep you healthy through your weight loss season and working with our Bariatric Dieticians you'll be given expert guidance to customize your diet plan to suit your particular needs. If you don't have your own ideas in mind, we are always happy to suggest well researched and validated plans that provide enough food to prevent hunger but still tap into your excess fat reserves and lose weight in a sustainable fashion.  There is great evidence that lean protein/healthy fat intake with good fiber intake while minimizing simple starches/carbs produces reliable/sustainable weight loss in most people. But some feel more connected to an intermittent fasting/fast mimicking or ketogenic diet.  These protocols can be hard for many to stick with and that's why we prefer the protein/healthy fat focused diets but if these alternative strategies appeal to you, we can work with you to optimize your knowledge and results with these tools.    Losing weight is a temporary commitment, but you need to be \"All In\" to have a good weight loss season.  To avoid frustration, you have to be willing to be on track 19 out of 20 days or even " "better than that. But, weight loss season is generally only 4-8 months in length. After that length of time, it can be hard to maintain a negative calorie balance and our brain, motivations and metabolism will usually bring you to a plateau that cannot be broken in this modern world where other commitments start to take priority. That's when we look to stabilize the weight loss you've achieved.  If you've reached your goal by that time, fantastic, and job well done.  If there is more to go, then after a few months of stabilization, we can usually attack that previous plateau and break into new territory.    Because of this time limitation, we want to really get to work right away and get into a sustainable routine ASAP.  One of the best predictors of how much weight you're going to lose throughout the season is how much you lose in the first 6 weeks, so prepare well and jump in with both feet.      Occasionally, people may feel like they cannot commit fully to the changes necessary and may want to change one thing at a time and \"get used to\" the idea of losing weight.  That is OK because that is where they are in their life, and they cannot fully commit for any number of reasons.  It's part of that internal motivation and they just haven't reached PRIORTY NUMBER ONE status yet. It's possible that what they need is more time to reach that point and I am always willing to work with people that want to \"dabble\", but understand, the amount of success obtained with half measures, is much less than half results. Behavior Change cannot occur until we prepare our minds, bodies and environment for what is to come, action!    As you go through your plan, look for things to keep your motivation rolling.  The most successful people have a goal or target/reward that they are working towards.  Having a reward that celebrates your new fitness, mobility and energy is the best sort because it will encourage you to do well with the " "weight maintenance phase and long term lifestyle changes that promotes keeping the weight off for the long term.  Usually, \"getting healthier\" or improving blood tests or losing weight so your clothes fit better is not as internally motivating as having a tangible reward.  A good weight loss season reward is one that isn't food based, is affordable, but something special:  Something you won't be getting/doing unless you achieve your goal.   It s important to keep to the rule of success:  in order to get the reward, your goal MUST be achieved. Write this reward down, where you can look at it daily and keep it in the front of your mind as you go through your weight loss season and it will help keep you on track.    Tools that help change behavior are vital for success. The most studied and most supported tool for weight loss is nothing more than writing down your food and weight every day.  Every Day.  Accurately and completely.  When you commit to weight loss season, this information tells you whether you're getting ENOUGH food to fuel your weight loss properly as well as teaches you the interaction between different foods you eat and how your body responds with weight loss.  You'll see that sometimes after a heavy workout you don't see the scale move until 2-3 days later.  How saltier meals (chili for example) may make you retain water for 4-5 days before you see the weight come off, you'll get used to the mini-plateaus that develop after a good 3-8 lb drop in weight as well as how you break through if you keep working the diet as you should.    Weight loss is not a linear process, there are mini ups and downs.  Learning how your body loses weight and getting comfortable with that is very rewarding. The act of writing words on paper also solidifies your will power and commitment to the season of weight loss and that by itself changes your brain chemistry/appetite, motivation and prepares you for maximal success. "     Behavior change is all about getting into a new routine.  The old habits and routine have to change because without changing the circumstances of how you gained your weight, it's unlikely you'll enjoy satisfying results. If you have snacking habits, like every time you walk through the kitchen you grab a little something, well, that habit has to change and be replaced by a new habit.  It can be something as simple as keeping a doodle pad on the counter that you make a few scribbles and then walk through the kitchen having not opened the cupboard, or starting with a glass of water and leaving the kitchen without anything else, or checking your food journal to see how many calories you have left for the day.  Boredom is the enemy as are the old habits. Break new ground and try to push those old habits into a deep hole.  There are apps/counseling options available that can help with some of the day to day urges/behaviors if you're struggling. One commercial product that does a good job is [employer].  Unfortunately, there is a subscription fee.    Finally, exercise always helps.  While not mandatory to lose weight, every little bit helps and exercise has so many other benefits that to not work it into your plan is to miss out on all the mood, sleep, stress and general health benefits that come from making yourself a little short of breath and sweaty at least 3-4 days out of the week.  The metabolism and calorie burn benefits aside, almost every chronic ailment in medicine gets better with proper, aerobic exercise.  Allow yourself to start slow and let your body prepare itself to accept harder training 4-6 weeks down the road, but start now and commit to a plan.  Whether you have the means to hire a , join a gym or just walk out your front door or go down to your basement for a video workout, get into a exercise routine and  after 3 weeks of at least 3 times a week exercise you should be at a point where you can  "slowly start ramping up 10% each week to our goal of at least 150-300minutes weekly of aerobic exercise and at least twice weekly resistance training/strenghtening with weights/bands or body weight exercises.     I am a big fan of modifying the free training plan, \"Couch to 5k\", for almost all of my patients. Just type it into Google or look it up on your smart phone david store.  To modify the plan,  you can use the training plan for whatever aerobic activity you do (bike/treadmill/elliptical/rower/pool/etc). During the \"jog\" intervals, you just move a little faster or harder or increase the tension or incline.  You use those little intervals to switch up the workout and recruit more muscles and pump the blood a little more and then recover again in the \"walk\" intervals by slowing down, decreasing the incline or turning down the tension.  3-4 days a week is not that much to ask and the benefits are enormous.  Start slow and develop the base from which you can then build on and reduce the risk of injury.  It's much more important 2-4 months from now to be enjoying your exercise then it is to over exert yourself at the start and hurt yourself.  Starting slowly allows your body to accept the training better down the road when the exercise becomes crucial for weight maintenance.  Without exercise down the road during your maintenance phase, all this hard work you are about to put in can be undone. It usually takes about 100-300 calories a day of exercise to maintain a weight loss and our focus during weight loss season is to generate the routine/activities and hobbies that make that enjoyable/sustainable.    Thanks for taking this first and most important step in your weight loss season.  Commit to it and we will cheerlead you all the way to success.  When things get tough or off track we'll offer guidance and analysis and when you reach your goal we'll celebrate your success.  In the end, it is all about your success, " your health and what you do with it.      Tahir Yeh MD  [facility]  [phone]        LEAN PROTEIN SOURCES  Getting 20-30 grams of protein, 3 meals daily, is appropriate for most people, some need more but more than about 40 grams per meal is not useful.  General rule is drinking one ounce of water per gram of protein eaten over the course of the day:  70 grams of protein each day, drink 70 oz of water.  Protein Source Portion Calories Grams of Protein                           Nonfat, plain Greek yogurt    (10 grams sugar or less) 3/4 cup (6 oz)  12-17   Light Yogurt (10 grams sugar or less) 3/4 cup (6 oz)  6-8   Protein Shake 1 shake 110-180 15-30   Skim/1% Milk or lactose-free milk 1 cup ( 8 oz)  8   Plain or light, flavored soymilk 1 cup  7-8   Plain or light, hemp milk 1 cup 110 6   Fat Free or 1% Cottage Cheese 1/2 cup 90 15   Part skim ricotta cheese 1/2 cup 100 14   Part skim or reduced fat cheese slices 1 ounce 65-80 8     Mozzarella String Cheese 1 80 8   Canned tuna, chicken, crab or salmon  (canned in water)  1/2 cup 100 15-20   White fish (broiled, grilled, baked) 3 ounces 100 21   [place]/Tuna (broiled, grilled, baked) 3 ounces 150-180 21   Shrimp, Scallops, Lobster, Crab 3 ounces 100 21   Pork loin, Pork Tenderloin 3 ounces 150 21   Boneless, skinless chicken /turkey breast                          (broiled, grilled, baked) 3 ounces 120 21   [place], [place], [place], and Venison 3 ounces 120 21   Lean cuts of red meat and pork (sirloin,   round, tenderloin, flank, ground 93%-96%) 3 ounces 170 21   Lean or Extra Lean Ground Turkey 1/2 cup 150 20   90-95% Lean Bellamy Burger 1 eleonora 140-180 21   Low-fat casserole with lean meat 3/4 cup 200 17   Luncheon Meats                                                        (turkey, lean ham, roast beef, chicken) 3 ounces 100 21   Egg (boiled, poached, scrambled) 1 Egg 60 7   Egg Substitute 1/2 cup 70 10    Nuts (limit to 1 serving per day)  3 Tbsp. 150 7   Nut Rembert (peanut, almond)  Limit to 1 serving or less daily 1 Tbsp. 90 4   Soy Burger (varies) 1  15   Garbanzo, Black, [place] Beans 1/2 cup 110 7   Refried Beans 1/2 cup 100 7   Kidney and Lima beans 1/2 cup 110 7   Tempeh 3 oz 175 18   Vegan crumbles 1/2 cup 100 14   Tofu 1/2 cup 110 14   Chili (beans and extra lean beef or turkey) 1 cup 200 23   Lentil Stew/Soup 1 cup 150 12   Black Bean Soup 1 cup 175 12             To help lose weight in a safe way and sustainable way, I'd like to start you on a 1300 calorie diet each day.  Understanding that every 3500 calorie deficit adds up to a pound of weight reduction, with the combination of light aerobic exercise, some modest weight training and diet, we should be able to lose around 1 to 2.5lbs weekly depending on your starting height and weight and exercise routine with this goal intake. Dropping abut 1% total body weight weekly is exceptional weight loss and very sustainable once in a good routine.    Hunger and fatigue are the enemies of weight loss and behavior change in general.  We become much more reactive in our eating when we're hungry and tired and decrease our levels of self control.  It's not a personal failing, it's just body chemistry.   We can combat this by trying to avoid being tired and hungry at the same time.  To help this,  try to front load your calories in the first 10 hours of you day so you get into the fatigued evening hours reasonably full and you can control impulses/mindless eating a lot better and avoid those bedtime snacks/evening treats that the tired brain craves.  If you can getting your exercise in the beginning of your day has also been shown to have superior results (but anytime is better than none).  We want to build up to 150 minutes or more as you progress through the first 2-3 months of weight loss season (300 minutes weekly is ideal for maintaining weight loss for  most after the end of weight loss season).     Weight loss goes through ups and downs and plateaus but if you stay on the program, you will enjoy success.   Commit to the process, try to be on track at least 19 days out of 20 and continue to think about why you're doing this and what you're working towards. If you haven't thought of your reward for hitting your weight loss goals, think about it now. Using these little victory bribes along the way helps a lot.    Finding a diet that is satisfying and repeatable-- day in and day out, improves success.  The following uses the concept of Daily Caloric Restriction, eating less every day.  An outline of how to break up the day's food is given below.  It is a starting point and example of what a 1300 calorie diet looks like.  You can modify the listed foods to suite your particular tastes, but pay attention to portions and protein content.   Do not skip breakfast on this plan as it will leave you hungry and lead to overeating at some point during the rest of the day.  If you can make supper the last food for the day more days of the week then not, it will help a lot.  That means that the intake during those first 10-12 hours of the day and hitting your protein/intake targets for all three meals is vital to your success and evening hunger control.     Start reading labels so you know that you're getting what you think you are and start measuring foods so you can eventually look at a portion and understand how it will provide the fuel you want.    Prepping raw veggies after you buy them (washing and putting into bags/tupperware for easy access), cooking several chicken breasts/proteins for the next 2-3 lunches and generally being able to grab and go what will keep you on target when time is short will greatly aid your success.  Prepare and plan ahead and success will follow. It really only requires a couple days weekly to optimize the access to the right food at the right  time of day.  Food delivery and stopping at fast food is a sign of reactive eating and usually will signal a stalling of your weight loss.    Read labels:  for protein portions/yogurt, protein bars etc looking for items with more than 10 grams of protein and less than 10 grams of sugar is very helpful.  Frozen meals should have at least 18 grams of protein, under 10 grams of sugar as well (typically around 300-380 calories).    Please note: if you've had previous bariatric surgery: wait 20-30 minutes before/after eating to drink your beverages to avoid early fullness/dumping syndrome/worsening malabsorption, early loss of fullness and hunger.  Start with eating your protein first, slow down your meals and chew thoroughly.          1300 calorie diet:  Think Big Breakfast, Medium Lunch, smaller dinner.  Note: it's OK to consolidate the calories/protein of the mid morning snack with breakfast and the midafternoon snack with lunch if time doesn't allow or if your don't wish to have those snacks. No snacks recommended after supper. If you're prone to late afternoon nibbling, that is a great time to get your fitness in so you can get to supper without the extra.    Breakfast goal of 300 calories-350 calories (egg, 1/3 to 1/2 cup cooked old fashioned oatmeal or steel cut oats and berries,3-4oz greek yogurt.)Glass of water and if you like coffee (black) or tea.        Mid morning Snack or part of lunch, about 2-2.5 hrs later: 100 calories (cottage cheese/string cheese/ fruit or banana) and a handful of cruchy/green veggies (cucumber/celery/green peppers/broccoli).  Small glass of water    Lunch:  300 calories (3.5-4 oz tuna with little brice (no bread), apple, salad with drizzle of olive oil/balsamic vinegar for example)  Water    Snack:  100 calories.  4-5 oz Greek Yogurt with at least 17 grams of protein per serving.    Or Cottage cheese (lower sodium version preferable). Get this in about 2 hrs before you plan to have  dinner. Protein drinks with at least 15-20 grams of protein and less than 6 grams of sugar could be used here to hit protein goals and decrease afternoon/evening hunger.  Glass of water    Dinner:  350 calories (4 oz meat or fish with cooked veggies or salad with minimal dressing, one piece of bread).  Glass of water or unsweetened tea with lemon  Dessert: 100 calories Medium Apple or Small handful of nuts, about 2/3 of an ounce (almonds/walnuts/cashews or pistachios are ideal).   Glass of water.    Try to avoid all soda and juice (low sodium V8 ok once a day).   You can do it! Embrace the healthier you and give up the inflammatory sugary treats that accelerate disease.    Choose an activity that is fun/interesting and available to you such as  Going for a swim for 15 minutes, walking 40 minutes, elliptical 20 minutes or cycling 20 minutes as many days a week as you can.  Having a walking or workout buddy can make it even more enjoyable and keep you on track the days your motivation/energy may be lower.  If those times are too long for your fitness level, start at 10 minutes of movement and each week try to increase by 2 minutes each week.  The first 70 minutes a week (10 minutes a day only) of exercise drops the mortality rate of a sedentary person 30%!!!!  Our goal is to work up to 150 minutes or more per week of moderate to vigorous exercise  to optimize metabolism and prevent weight regain during maintenance. If time is short and your fitness allows it, high intensity interval training can be a nice way to cut the workout time in half:  warm up 2-4 minutes then 3-6 intervals of increased intensity effort (70% of max heart rate) followed by an equal amount or more of recovery before repeating, then 1-3 minutes of cooldown.     10 minutes of weight lifting can be helpful as well or using some body weight exercises like wall squats, pushups (with assistance as needed or standing/wall pushups), seat presses, yoga  "moves. At least twice weekly helps maintain a good strength to weight ratio, more days is better.  [employer] is a great resource for free video demonstrations.    If you are into strenuous weight lifting or prolonged exercise, use an online calculator for how many calories you've burned and if your exercise is lasting over 60 minutes, replace 20-30% of those calories burned immediately after exercise .  For the more limited exercise (less than 500 calories burned), there is no need to add extra food unless you notice a lot of hunger on your food journal.  Usually  Even a  calorie load after longer workouts is more than adequate.  For longer efforts, hunger will increase if you don't refuel afterwards and can get meal plan off track due to hunger, so replenish immediately after the workout to keep on the diet plan and feeling good.    Exercise example:  If you burned 1000 calories during the exercise, immediately (within 30 minutes) have a snack/replacement beverage totaling 200-300 calories and ideally have a 3:1 ratio of carbohydrate grams to protein grams to keep muscles ready for exercise the next day.  Have your next, full meal within 2-3 hours of exercise.    Tips for success:  KEEP A FOOD JOURNAL and a log of daily weights.  Pencil and paper works fine for most. Otherwise, [employer], [employer], [employer], [employer] are all good tracker apps/programs or websites for food/fitness.  Remembering to ask yourself, \"How did my nourishment affect me today\" and comparing \"good days\" to \"hungry days\" to solidify what helps your body, in your life, feel it's best while losing weight.    1.  Prepare proteins ahead of time (broil chicken breasts in bulk so you can grab and go), steel cut oats can be stored in casserole dish/bowl in the fridge for quick scoop in the morning and rewarm in microwave, make use of crock pot recipes (watch salt content).    2.  Drink a 8-12 oz glass of water every 2-3 hours when awake.  We " "often mistake hunger for thirst, especially when losing weight.    3. Remember your Reward and Motivation when things get hard.    4.  Weigh yourself every morning and record, you'll stay on track better and learn how your body loses weight. Don't worry about 1 or 2 day patterns, but when on track you'll notice good trend downward of weight over 3-4 day segments.    5. Call or use [employer] messaging if problems/concerns .    6.  Find a handful of meals/foods that keep you on track and get into a boring routine that is sustainable for you.    7.  Take a complete multivitamin just to make sure all micronutrients are adequate during weight loss.    8. If losing hair/brittle nails it often means you are not taking enough protein.  Minimum goal is 60 grams daily of protein, most people with normal kidneys do well with upwards of 100-120 grams/day of protein. Consider taking Biotin as supplement or a \"Hair and Nail\" multivitamin.  If you are hypothyroid and losing hair, see you doctor for a check up of your levels if you haven't had one recently.    9.  Getting adequate sleep is very important for starting your day properly, when we are sleep deprived, our morning appetite is suppressed and without eating an adequate breakfast, we overeat later in the day when we're tired.  Our body heals in our sleep and our mental and immune health depends on this rest.  Aim for 7-8 hours of sleep nightly if possible.  If you sleep is disturbed, perform some introspection on stressors/depression/anxiety/PTSD events or possible sleep disorders and we can trouble shoot solutions/evaulations if issues persist.    Exercise during the day, meditative breathing before bed and after waking and removing the television from the bedroom are easy ways to improve quality of sleep.      10. Relaxation.  Controlled breathing exercises can lower stress levels in the brain.  One technique is 4, 7, 8 breathing:  Place the tip of your tongue behind your " front teeth, breath in through your mouth for 4 seconds, Hold it for 7 seconds and breath out slowly, making a leaky tire noise for 8 seconds.  Repeat 4 times.  Ideally do at the start and end of your day or if feeling stressed.  It works and it's why meditation/yoga/martial arts are often very breath based activities.  There are breathing techniques for alertness as well as relaxation out there and they can be quite helpful.      MEDICATIONS FOR WEIGHT LOSS  There are several medications available to assist us in weight loss.  By themselves, without compliance to a change in diet and increase in movement/activity these medications are disappointing in their results. However, combined with a closely monitored program of diet change and exercise they can be very effective in controlling appetite and boosting initial weight loss.  All weight loss medications need continual re-evaluation for efficacy as their side effects and health benefits fail to be worthwhile if a person is not continuing to lose weight or in maintaining their healthy weight.  Some weight loss medications are scheduled drugs, meaning there is at least a theoretical possibility for developing addiction to them but in practice this is rare.  We do anticipate coming off meds in the future after stabilization of weight loss is assurred.  Finally, a tolerance can develop and people s perceived efficacy of medication can diminish.  In communication with your physician, it may be appropriate to intermittently take a break from these medications and then restart again (few weeks off then restart again) if a plateau is reached that cannot be broken through.  Each person can respond to a medication differently and to be a good option for you, it will need to be affordable, effective and well tolerated with minimal side effects.    In most cases, weight loss progress after one month and three months will be obtained and if a patient is not reaching the  satisfactory progress towards weight loss, the medications may be discontinued.  The thought is that if a person is taking a weight loss medication and not receiving the potential health benefit of that drug, the side effects are not worthwhile and use should be discontinued.  On the flip side, there are many people on some weight loss medications for years because it continues to be an effective tool in their weight management and they are tolerating the medication without any long-term side effects.  Each person's response and purpose will be evaluated.      PHENTERMINE (Adipex): approved in 1959 for appetite suppression.  It has stimulant effects and cannot be used with Ritalin, Concerta, or other stimulants.  Although it is not highly addictive, it's chemically related to amphetamines which are addictive.  Occasional dependence can develop, but rarely. The most common side effects are dry mouth, increased energy and concentration, increased pulse, and constipation.  You should not take phentermine if you have glaucoma, hyperthyroidism, or uncontrolled/untreated hypertension or overly anxious. You should stop if dramatic mood swings, severe insomnia, palpations, chest pains, visual changes or if your Blood Pressure is consistently elevated or any time it's over 160/90.   It's ok to go off the med for a few weeks and restart if efficacy is wearing off.  $24-$30 for 90 tablets at [facility]. Females are required to have reliable birth control to reduce the risk birth defects/miscarriage.      TOPIRAMATE (Topamax): Anti-seizure medication, also used to prevent migraines and sometimes for mood stabilization.  Side effects include paresthesia, glaucoma, altered concentration, attention difficulties, memory and speech problems, metabolic acidosis, depression, increase in body temperature and decrease sweating, risk of kidney stones.  Do not take Topamax while taking Depakote as this can cause high ammonia levels.   You must have reliable birth control as Topamax can cause birth defects.  If prolonged use has occurred it should be tapered off slowly to avoid withdrawal issues.  Insurance usually covers Topiramate.  At higher doses, there may be some confusion/forgetfulness associated with this so we try to limit dose to under 75mg twice daily to reduce this risk. Often covered by insurance as it's used for many reasons.  Topamax will cause carbonated beverages to taste bad. A recheck of your kidney/electrolytes may occur within a few months of starting.    QSYMIA (Phentermine + Topamax):  See above information about phentermine and Topamax.  Most common side effects are paresthesia, dizziness, distortion of taste, insomnia, constipation, and dry mouth.  See above descriptions for the two individual agents.Females are required to have reliable birth control to reduce the risk birth defects/miscarriage.  $150-$220 per month      Liraglutide (Victoza/Saxenda):   Part of the family of Glucagon Like Peptide Agonists, liraglutide directly suppresses appetite and is often used by diabetic patients due to decrease liver production of glucose, thereby improving glucose levels.  It also slows how quickly the stomach empties. May be hard to get covered for non diabetics and is an injectable medication delivered via a injector pen. It can be very costly without insurance coverage (over $500/month).  Small risk for pancreatitis and dose should be held if increased mid abdominal pain/burning. It is not to be used if previous Multiple Endocrine Neoplasia. In rodents, may increase risk of thyroid tumors and not indicated for anyone with hx of medullary thyroid cancer as a result.  If changes in voice/swallowing should be discontinued. Reliable birth control required in women.    Contrave (Bupropion/Naltrexone).    Synergistic combination of a mild appetite suppressing anti-depressant (Bupropion) whose effects are increased due to interaction  "with Naltrexone.  Naltrexone may have some effects on craving and is often used in addiction medicine to help previous opiate addicts be less prone to relapse as it blocks the action of opiates. Should be stopped if any need for opiate pain medication, surgery or planned procedures where you'll be given sedation/anesthesia. If prolonged use recommend stepping down bupropion over 2-3 weeks to limit any risk of withdrawal issues. Side effects may include dry mouth, increased heart rate, mild elevation in Blood pressure;  dizziness, ringing in the ears, anxiety (typically due to bupropion), nausea, constipation, and some get fatigued with naltrexone.  About $210 on [facility] for 120 tabs of \"Contrave\", the brand name without insurance coverage. Generic Bupropion 75mg: $25 for 120 tabs, Naltrexone: $55 for 90 tabs without insurance coverage on [facility]. Cannot be used if pregnant/trying to conceive or breast feeding.      Plenity:   Recently available October 2020, by mail order pharmacy only, but expensive. $98/month.  2-3 capsules taken 20 minutes before 2 meals daily provides crystals that expand into a gel that provides a mechanical fullness. Gel mixes with your next meal and increases satisfaction by bulking the meal up to feel bigger than it truly is. Plenity is not absorbed and gets passed through the digestive tract and excreted in stools. May cause some bloating/gas/full feeling as it behaves like a fiber in many ways. Cost not available yet. FDA cleared in March of 2019 but not available in stores as of March of 2020. Clearance safety and efficacy data done under \"Gelesis\" name. Not appropriate for people with stomach or bowel motility issues as requires you to pass it through digestive tract. [email or web address] has more information.  Bupropion/Naltrexone Patient Information (trade name, CONTRAVE)    This medication is used for weight loss.  In studies people lost an averaged 5-8% of their starting weight compared " to placebo (0-1%).    Often, this medication will be written as 2 separate prescriptions to improve cost to the patient. The trade name drug CONTRAVE comes as a combination of 8mg naltrexone/90mg bupropion and a 3 week escalating dose regimen going from one tablet daily up to a max of 2 tabs twice daily:  Week 1: one tablet in the daytime.  Week 2: one tablet A.M.  And One tablet in the PM.  Week 3: 2 tabs AM  And One tablet in the PM.  Week 4: 2 tabs AM and 2 tabs PM.     The generic Rx I write for is as follows:  I recommend starting One 75 mg bupropion and 1/2 a tablet of naltrexone (25mg) daily for 10 days and then moving up to One 75 mg bupropion tablet twice daily and half a naltrexone tablet twice daily.  Depending on your results/tolerance, we may increase the Bupropion up to a maximum of 150 mg twice daily of the immediate release medication or one Bupropion  mg-300 mg daily. Sometimes we'll have to go slower with the dose escalation.    Like any weight loss medication, showing results and having tolerable or no side effects is vital to continuing therapy and if either no significant weight loss after 8-12 weeks or too many side effects then we would discontinue therapy and look for alternative options/assistance.    AVOID taking with high fat meals as this increases bupropion levels, but some food in the stomach can help decrease nausea side effects from the Naltrexone.    People who take Metoprolol may need to decrease their dose of metoprolol as the bupropion increases the effect of metoprolol 2-4 fold in some cases and low blood pressure/low heart rate could occur.    Patients should STOP CONTRAVE if they have a severe allergic reaction to CONTRAVE.  Patients should be told that CONTRAVE should be discontinued and not restarted if they experience a seizure while on treatment.    Patients should be advised that the excessive use or abrupt discontinuation of alcohol,benzodiazepines, antiepileptic  drugs, or sedatives/hypnotics can increase the risk of seizure.    Patients should be advised to minimize or avoid use of alcohol.    Patients should be advised that if they previously used opioids, they may be more sensitive to lower doses of opioids and at risk of accidental overdose should they use opioids after CONTRAVE treatment is discontinued or temporarily interrupted.  Patients should be advised that because naltrexone, a component of CONTRAVE, can block the effects of opioids, they will not perceive any effect if they attempt to self-administer anyopioid drug in small doses while on CONTRAVE. Further advise patients that the attempt to administer large doses of any opioid or to bypass the blockade while on CONTRAVE may lead to serious injury, coma, or death.    Patients should be off all opioids for a minimum of 7 to 10 days before starting CONTRAVE in order to avoid precipitation of withdrawal. Advise patients they should not take CONTRAVE if they have any symptoms of opioid withdrawal.   Patients should be advised to call their healthcare provider if they experience increased blood pressure or heart rate.  Patients should be advised to notify their healthcare provider if they are taking, or plan to take, any prescription or over-the-counter drugs. Concern is warranted because CONTRAVE and other drugs may affect each other s metabolism.  Patients should be advised to notify their healthcare provider if they become pregnant, intend to become pregnant, or are breastfeeding during therapy.  CONTRAVE should NOT be taken if pregnant or nursing.    Patients with type 2 diabetes mellitus on antidiabetic therapy should be advised to monitor their blood glucose levels and report symptoms of hypoglycemia to their healthcare provider(s).    Patients should be advised to swallow CONTRAVE tablets whole so that the release rate is not altered. Do not chew, divide, or crush tablets if using the Trade drug Contrave,  dividing the generic naltrexone is fine.    As always, if any questions/concerns, don't hesitate to call us at the [facility] [place] [facility] clinic.    Tahir Yeh MD  [phone].  4/28/2021      Exercise Guidance    Nearly everything that bothers us gets better when the proper amount of exercise can be done in the proper amounts.  Getting to that level safely and without injury is the key.  When it comes to weight loss, exercise is especially important in maintaining the weight loss.  Unfortunately, one of the harsh realities is that substantial weight loss slows our metabolism, often anywhere from 5-20%.    Our brain always remembers our heaviest weight and we can return to that if we're not mindful and moving regularly.  Our biology doesn't understand the concept of having too much energy, only not having enough.  As such, when we lose weight, it's thought that the brain interprets this as we're ill or in a famine and dials back our metabolism to limit further weight loss.  This is why exercise is so important in keeping the weight off and is the main reason people have some weight regain from their low weight point after weight loss.  We have to make up that 10-20% of calories not being burned.Since we can restrict our intake for only so long, exercise becomes very important in our long term healthy weigh maintenance to balance out the occasional indiscretion with our diet.    Generally, for every 5% body weight reduction in a weight loss season, a person needs to add  kilocalories of exercise in their daily routine to keep that weight off for the long term.  This is why it's vital to be starting your fitness regimen during weight loss season, so that routine is well established as you move into your maintenance period.    Additionally, all sorts of good enzymes and genes turn on with exercise and our stress, sleep, mood and bodies feel better when we can get to the point of making  ourselves a little sweaty and short of breath 35-50 minutes most days of the week. But we have to start with what we can do first and give ourselves permission to work our way up to this goal.    Who isn't ready for exercise? Well, if you get severe dizziness/palpitations, chest pain or short of breath/faint with even minimal activity like walking across a room or you're having to pause while going up a flight of stairs, then getting your heart and/or lungs fully evaluated prior to starting an exercise regimen is recommended. Everyone else can probably start a program, but everyone may start at a different point:  Some can set a 5-10 minute walking goal and others will be able to ride their bike for an hour.      Start with where you're at and look to add 10% more each week until you're at that 150 minutes or more a week (or 75 minutes/week or more of vigorous exercise). Moderate exercise can be estimated as the pace you can carry on a conversation and vigorous is the pace at which you can get 3-5 words out before having to take a breath.  If you're using heart rate monitoring, Moderate is about 60% of your maximum heart rate and vigorous about 75%. (Max heart rate estimated as 220 beats minus your age:  Example: 220-age of 44 =176 Beats per minute (BPM) maximum. 0.6X 176= 105 BPM (moderate), 132 BMP(vigorous)).    If you like to count steps, the 10,000 steps per day does correlate well with weight maintenance but try to make at least 20-25% of those steps at a brisk pace (like you are about to miss your bus).    Finally, if you are pressed for time, it's important to know that some exercise is better than none.  High Intensity Interval training (HIIT) is a good way to get as much out of a short period of working out. If you can't walk, use the stairs, bike or swim; you could use a punching/arm workout regimen for your activity.  The idea with HIIT is to have a 3-6 minute warm up period of low intensity and the 3-6  "\"intervals\" where you push the intensity up and then recover and start the next interval. One study showed that 3 intervals of 20 seconds at \"Maximum Effort\" while either biking on a stationary bike or going up stairs and then having 100 seconds recovery time before the next Maximum Effort was equally as beneficial on cardiovascular fitness development as doing 30 minutes of moderately paced walking 3 days weekly over a 6 week period of time.  So intensity matters. You just need to be able to safely do your desired exercise without injury. There are many great HIIT exercises/routines out there. IF you're not doing much exercise currently, I recommend giving your self 2-3 weeks of moderate exercise, 3 days weekly minimum to get your bones/tendons/muscles used to exercise before going for High Intensity workouts.    If you like to use Apps on the phone, the couch to 5k david and 7 minute workout apps are nice places to start if you are reasonably healthy.  There are hundreds of other options out there.  Consider viewing Apps[employer] if gentler exercise/movement is desired. Videos on [employer] and chair yoga for seniors exist and are free. Check them out and let's get that 3-4 days a week routine going.    Let's move!  Tahir Yeh MD.     High Intensity Interval Training (HIIT):    To feel our best, our bodies need to move. Our mental health, sleep, memory, immune function, stress coping and metabolic health depend on exercise and its benefits for optimizing how our body works best. In the modern world, most do not get nearly enough exercise.  However, it's important to understand that ANYTHING is better than nothing and if you can get started with a routine for fitness, even a little bit has some benefit compared to none.  The more you do, the better (up to 20 hours weekly, beyond that the benefit tails off), but the NIH guidelines for all adults in [place] is to work up to 150-200 minutes of moderate aerobic activity each " "week to improve our health.  If you're a person that struggles with time pressure/lack of interest then those 2.5-3 hours weekly may be too much to ask.  So, we have to be very efficient in how we exercise to reap the benefits. It turns out that INTENSITY matters. When we use Vigorous rather than Moderate aerobic activity (Vigorous defined by a heart rate of 70-85% of calculated maximum heart rate; max heart rate equals 220 beats minus your age or the level of effort where you could talk 2-4 words before needing to take a breath), the amount of time required to reap the benefits of exercise is cut in half:  75-90 minutes/week.      A recent study showed that a 10 minute interval workout using a 3-4 minute warm up and then 20 second \"maximum effort\" followed by 1 minute, 40 seconds of recovery and then repeated two more times was as effective in improving fitness as a 30 minute brisk walk.  They utilized exercise bikes or stairs for the effort but you could adapt to whatever geography/gym/pool/bike/hill/staircase that you may have as long as you can safely do the effort without injury.   As your fitness improves, intervals of 30 seconds to 2 minutes of increased effort followed by 1-2 minutes of recovery are options as well. The fitter you become, the harder and more intervals you'll be able to do.  Start with what you CAN do, not what you WANT to do and that will allow your body to adapt/develop fitness down the road to reach your goals.  Give yourself permission to develop a base of fitness the first 3 weeks and then you should be able to ramp up from there nicely and progressively each week.    Jumping right into a High Intensity Interval workout if you've not been having some level of exercise/fitness recently can increase your risk of injury.  To help develop some base fitness here are some options that would give you a 3-4 week base development, assuming you can walk or ride a stationary bike but haven't been " doing much the last few months. 3-4 sessions each week of:      Week Warm up Interval: 3-6 repeats Cooldown   1 3-5 minutes easy walk/spin 20 seconds brisk but doable pace then recover with 90 seconds easy 2-3 minute easy walk/spin   2 3-5 minutes easy walk/spin 25 seconds brisk, 90 seconds recovery 2-3 minute easy walk/spin   3 3-5 minutes easy walk/spin 30 seconds brisk, 90 seconds recovery 2-3 minute easy walk/spin   4 3-5 minutes easy walk/spin 40 seconds brisk, 60 seconds recovery 2-3 minute easy walk/spin     *for base development, keep resistance steady and just  the speed. Once you've completed base phase, feel free to add a little extra resistance to the faster phase to increase vigorousness/heart rate.  During base phase you should be still able to talk comfortable/sing during faster pedaling/walking.     After completing the base phase, start ramping up workouts on the bike/walking. Have one easy pace workout but of longer duration (add 2-3 minutes each week to the time) each week.  One workout weekly should have an interval workout where you push your intensity working up to those 15-30 second maximum efforts and recovering fully and then repeating for at least 3-4 intervals.  Cool down/easy pedal at the end for 1-2 minutes.     Consider a spin class if you have the means/access and remember, it's OK to rest if needed. Make the workout yours and don't focus on other people's abilities, getting started will develop your own fitness every week.  Jogging plans such as the Couch to 10k or any aerobic training program make use of similar intervals and can help you reach a fitter and more capable body regardless of where/how you perform the intervals (walking/biking/swimming/elliptical/row machine/erg arm machine, peddler, raking, lawn mowing,etc).  Go for it.

## 2021-06-17 NOTE — ANESTHESIA POSTPROCEDURE EVALUATION
Patient: Diane Costa  * No procedures listed *  Anesthesia type: epidural    Patient location: PACU  Last vitals:   Vitals:    03/30/18 0115   BP: 136/85   Pulse: 88   Resp: 16   Temp: 36.8  C (98.3  F)   SpO2: 97%     Post vital signs: stable  Level of consciousness: awake and responds to simple questions  Post-anesthesia pain: pain controlled  Post-anesthesia nausea and vomiting: no  Pulmonary: unassisted, return to baseline  Cardiovascular: stable and blood pressure at baseline  Hydration: adequate  Anesthetic events: no    QCDR Measures:  ASA# 11 - Marce-op Cardiac Arrest: ASA11B - Patient did NOT experience unanticipated cardiac arrest  ASA# 12 - Marce-op Mortality Rate: ASA12B - Patient did NOT die  ASA# 13 - PACU Re-Intubation Rate: NA - No ETT / LMA used for case  ASA# 10 - Composite Anes Safety: ASA10A - No serious adverse event    Additional Notes:

## 2021-06-17 NOTE — PROGRESS NOTES
Diane Costa is 36 y.o.  female who presents for a billable video visit today.    How would you like to obtain your AVS? MyChart.  If dropped from the video visit, the video invitation should be resent by: Text to cell phone: [phone]  Will anyone else be joining your video visit? No      Video Start Time: 9:15 am  9:10 AM    Provider Notes: Nonsurgical visit for weight loss. Patient started last month with initial surgery intake visit but was found to have a BMI under 35 thus preventing that tool from being utilized. She's here today to discuss non surgical methods to improve her weight related comorbidites of AZALEA, fatty liver disease, GERD and hypertension and reduce some risk for vascular issues given her vertebral artery dissection history.  Today we reviewed her RMR data from her dietician visit and discussed medication support for appetite suppression. RMR of 1744kcal/day and protein goal of 80-100g/day on chart review from her dietician visit.   Reviewed med options and she's decided to use naltrexone and Saxenda. In the past used  Exercise is minimal currently, has a 3 year old and 2 older brothers.    Plan:  1. Ramping up with non surgical weight loss given hypertension, sleep apnea, fatty liver disease history and some medication induced weight gain in the past with history of vertebral artery dissection.   2. Food intake goal of 1400kcal/day given relatively sedentary lifestyle. Aiming for 80-100g of protein/day about 25-30g/meal would be ideal. Follow up as planned with your dietician visit next week.  3. Mindfulness. Tracking intake/timing and sense of appetite control vs hunger based on the routine will be very helpful.   4. Exercise is limited by child rearing but 10min walk daily is a good place to start to work up to the 150min/week goal for all adults in [place]. Videos/machines/yoga/swimming/etc are all good options. Make a plan for each season on activities you enjoy.  5. Medication  support: start naltrexone to complement your previous bupropion and offset some of the loss of satiety from your gabapentin and metoprolol effects. Start HALF a tablet with supper for a week and then, if tolerating that dose, increase to half a tablet with breakfast and supper (take with food).   6. I'll send a PA for Saxenda as well. If covered, start 0.6mg injected daily and after 7-10 days increase to 1.2mg injected daily. We'll look to ramp up every 1-2 weeks until you reach the maximum tolerated dose or hit the 3mg/day limit. Stop if severe nausea/vomiting/abdominal pain due to risk of pancreatitis and let me know. Reduce dose if upset stomach/nausea is too strong a feeling (OK to take a break for 1-2 days if needed).   7. Recheck with me in one month.  8. Labs can be done anytime (orders in place).   9. Continue good sleep apnea treatment, weight loss should reduce severity (retainer use).       To whom it may concern,  I am writing this letter on behalf of my patient, Diane Costa to express a concern. My patient is in need of a medication called  Saxenda  that is currently not covered by your insurance plan.  Saxenda  was approved by the FDA on December 23, 2014, as an adjunct to a reduced-calorie diet and increased physical activity  for chronic weight management in adult patients with an initial body mass index (BMI) of 30 kg/m2 or greater (obese), or in adults  with a BMI of 27 kg/m2 or greater (overweight) in the presence of at least one weight-related comorbid condition (eg, hypertension,  type 2 diabetes mellitus, or dyslipidemia).  It is well recognized that obesity is a chronic illness associated with many related diseases, such as dyslipidemia and hypertension.  Obesity deserves the same treatment and attention as any other chronic illness. Please contact your health plan or pharmacy benefit  manager to pursue coverage for either this individual employee or for the company at large.    Thank  you for being part of the solution for the obesity epidemic in our country and worldwide. In combination with diet change/behavior and exercise, medication supported weight loss plays an important role in reducing the risk of cancer, cerebral and coronary heart disease, liver disease, arthritis progression and dementia.  I thank you for considering my patient for coverage of Saxenda.     Kind Regards,  Tahir Yeh MD  [facility]  [address] [address]      Video-Visit Details    Type of service:  Video Visit    Video End Time (time video stopped): 9:51 AM  Originating Location (pt. Location): Home    Distant Location (provider location):  [facility] [place]     Platform used for Video Visit: [employer]

## 2021-06-17 NOTE — PROGRESS NOTES
Assessment:        Diane Costa is a 33 y.o. female here for postpartum exam at 6 weeks postpartum.     Plan:     .1. Routine postpartum follow-up  -Doing well    2. Contraception management  -Discussed options including IUD, Nexplanon and POP. She would like to proceed with the POP for now and will let me know if she would like to do something different.   - norethindrone (GIL) 0.35 mg tablet; Take 1 tablet (0.35 mg total) by mouth daily.  Dispense: 84 tablet; Refill: 3    3. Anxiety  -Doing well with the Citalopram, f/u in six months, sooner as needed.        Subjective:       Diane Costa is a 33 y.o. female who presents for a postpartum visit. She is 6 weeks postpartum following a spontaneous vaginal delivery. I have fully reviewed the prenatal and intrapartum course. The delivery was at 38-3 gestational weeks. Outcome: spontaneous vaginal delivery. Postpartum course has been uncomplicated. She was in the NICU for 5 days after some jaundice and then a few apenic episodes, that self resolved though and she has done well since then. Baby's course has been uncomplicated. Baby is feeding by breast. Bled for  1 weeks postpartum.  Currently bleeding  no bleeding. Bowel function is normal. Bladder function is normal. Patient has not resumed intercourse. Contraception method is abstinence. Postpartum depression screening score:     EPDS Total Score: 3 (5/11/2018  9:47 AM)  PHQ-2 Total Score: 2 (6/7/2017  7:00 AM)  PHQ-9 Total Score: 13 (6/7/2017  7:00 AM)     She did restart the citalopram and that has been going well. She has had no side effects as far as she knows. She goes back to work on 6/21.        The following portions of the patient's history were reviewed and updated as appropriate: allergies, current medications and problem list    Review of Systems  General:  Denies problem  Eyes: Denies problem  Ears/Nose/Throat: Denies problem  Cardiovascular: Denies problem  Respiratory:  Denies  problem  Gastrointestinal:  Denies problem, Genitourinary: Denies problem  Musculoskeletal:  Denies problem  Skin: Denies problem  Neurologic: Denies problem  Psychiatric: Denies problem  Endocrine: Denies problem  Heme/Lymphatic: Denies problem   Allergic/Immunologic: Denies problem          Objective:         Vitals:    05/11/18 0946   BP: 124/76   Pulse: 74   Weight: 213 lb 6.4 oz (96.8 kg)       Physical Exam:  General Appearance: Alert, cooperative, no distress, appears stated age  Head: Normocephalic, without obvious abnormality, atraumatic  Eyes: PERRL, conjunctiva/corneas clear, EOM's intact  Ears: Normal TM's and external ear canals, both ears  Nose: Nares normal, septum midline,mucosa normal, no drainage  Throat: Lips, mucosa, and tongue normal; teeth and gums normal  Neck: Supple, symmetrical, trachea midline, no adenopathy;  thyroid: not enlarged, symmetric, no tenderness/mass/nodules  Back: Symmetric, no curvature, ROM normal, no CVA tenderness  Lungs: Clear to auscultation bilaterally, respirations unlabored  Breasts: No breast masses, tenderness, asymmetry, or nipple discharge.  Heart: Regular rate and rhythm, S1 and S2 normal, no murmur, rub, or gallop,   Abdomen: Soft, non-tender, bowel sounds active all four quadrants,  no masses, no organomegaly  Pelvic:Normally developed genitalia with no external lesions or eruptions. Vagina and cervix show no lesions, inflammation, discharge or tenderness. No cystocele, No rectocele. Uterus normal.  No adnexal mass or tenderness.  Extremities: Extremities normal, atraumatic, no cyanosis or edema  Skin: Skin color, texture, turgor normal, no rashes or lesions  Lymph nodes: Cervical, supraclavicular, and axillary nodes normal  Neurologic: Normal

## 2021-06-20 NOTE — LETTER
Letter by Isabel Jefferson MD at      Author: Isabel Jefferson MD Service: -- Author Type: --    Filed:  Encounter Date: 4/20/2020 Status: (Other)         April 20, 2020     Patient: Diane Costa   YOB: 1985   Date of Visit: 4/20/2020       To Whom It May Concern:    It is my medical opinion that Diane Costa may return to work on 4/27/2020.    If you have any questions or concerns, please don't hesitate to call.    Sincerely,        Electronically signed by Isabel Jefferson MD

## 2021-06-24 NOTE — TELEPHONE ENCOUNTER
RN cannot approve Refill Request    RN can NOT refill this medication overdue for office visits and/or labs.    Anshu Avalos, [facility] Triage/Med Refill 3/6/2019    Requested Prescriptions   Pending Prescriptions Disp Refills     escitalopram oxalate (LEXAPRO) 20 MG tablet 90 tablet 0     Sig: Take 1 tablet (20 mg total) by mouth daily.    SSRI Refill Protocol  Failed - 3/6/2019  8:10 AM       Failed - PCP or prescribing provider visit in last year    Last office visit with prescriber/PCP: 8/1/2017 Isabel Jefferson MD OR same dept: Visit date not found OR same specialty: 2/8/2018 Petey Campos MD  Last physical: Visit date not found Last MTM visit: Visit date not found   Next visit within 3 mo: Visit date not found  Next physical within 3 mo: Visit date not found  Prescriber OR PCP: Isabel Jefferson MD  Last diagnosis associated with med order: There are no diagnoses linked to this encounter.  If protocol passes may refill for 12 months if within 3 months of last provider visit (or a total of 15 months).

## 2021-06-24 NOTE — PROGRESS NOTES
Order for Durable Medical Equipment was processed and equipment ordered.     DME provider: [facility]    Date Faxed: 2/15/19    Ordering Provider: DR. VASQUEZ    Equipment ordered: APAP    Fax Number: IN HOUSE [id number]

## 2021-06-24 NOTE — TELEPHONE ENCOUNTER
RN cannot approve Refill Request    RN can NOT refill this medication PCP messaged that patient is overdue for Office Visit.     Stacey Santacruz, [facility] Triage/Med Refill 3/1/2019    Requested Prescriptions   Pending Prescriptions Disp Refills     escitalopram oxalate (LEXAPRO) 20 MG tablet [Pharmacy Med Name: ESCITALOPRAM 20 MG TABLET] 30 tablet 0     Sig: TAKE 1 TABLET BY MOUTH EVERY DAY    SSRI Refill Protocol  Failed - 3/1/2019  9:10 AM       Failed - PCP or prescribing provider visit in last year    Last office visit with prescriber/PCP: 8/1/2017 Isabel Jefferson MD OR same dept: Visit date not found OR same specialty: 2/8/2018 Petey Campos MD  Last physical: Visit date not found Last MTM visit: Visit date not found   Next visit within 3 mo: Visit date not found  Next physical within 3 mo: Visit date not found  Prescriber OR PCP: Isabel Jefferson MD  Last diagnosis associated with med order: There are no diagnoses linked to this encounter.  If protocol passes may refill for 12 months if within 3 months of last provider visit (or a total of 15 months).
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
## 2021-06-24 NOTE — PATIENT INSTRUCTIONS - HE
Equipment Instructions    We will process your PAP order and send it to a Durable Medical Equipment (DME) provider.    The medical equipment company should call you within 7 days.  If you have not heard from the company, please contact them to see if they received your order and are planning to call you.    Please call us at [phone] if you are unable to contact the medical equipment company or if they do not have the order.    If you are starting a new PAP machine, please call us after you use it the first night to let us know how it went. This call also helps us know that you received your equipment and that everything is ready. Please use our central phone number [phone]    Contact information for [email or web address] company:    -[employer] Tel: [phone]    Please bring your machine, mask, hose and power cord on the next clinical visit with me. Thank you.

## 2021-06-26 NOTE — PROGRESS NOTES
Assessment & Plan     Hot flashes  -Discussed possible etiologies including premature menopause as well as infection or medication side effects.  We will start with the labs as listed below and if they are unremarkable would plan on doing a 24-hour urine for pheochromocytoma evaluation including metanephrines, catecholamines and 5-HIAA.  If she does have hematuria would consider doing a CT of the abdomen and pelvis to look for kidney stones given her recent episode of flank pain.  - Thyroid Cascade  - Erythrocyte Sedimentation Rate  - HM1(CBC and Differential)  - Estradiol  - Progesterone  - Follicle Stimulating Hormone (FSH)  - Luteinizing Hormone (LH)    Essential hypertension  -Blood pressure has been under good control, updating CMP as listed below.  - Comprehensive Metabolic Panel    Mild major depression (H)  -Stable with her current medications    Flank pain  -Updating urinalysis today, would consider CT of the abdomen and pelvis to look for kidney stones with her hematuria.  Will await labs for final decision.  - Urinalysis Macroscopic  - Culture, Urine           Return in about 6 months (around 12/4/2021) for Annual physical.    Isabel Jefferson MD  [facility]   Diane Costa is 36 y.o. and presents today for the following health issues   HPI     She notes that her hot flashes are kicking up randomly again. She will be sitting on the couch and will have dripping sweats again. She does note that she does have dry mouth as well, will get white dryness on the sides of her mouth.     She did have some discomfort in her right flank a few days ago, felt likek a gall bladder attack bt she does not have her gall bladder.     She ran out of gabapentin and missed her last night dose, can tell today that her tingles are coming back.     Her BP is good as well.       Review of Systems    Per above      Objective    /80   Pulse 74   Wt (!) 226 lb  (102.5 kg)   LMP 05/31/2021   SpO2 97%   BMI 34.36 kg/m    Body mass index is 34.36 kg/m .  Physical Exam    GENERAL APPEARANCE: A&A, NAD, well hydrated, well nourished  SKIN:  Normal skin turgor, no lesions/rashes   HEENT: moist mucous membranes, no rhinorrhea  NECK: No LAD  CV: RRR, no M/G/R   LUNGS: CTAB  ABDOMEN: S&NT, no masses, no flank tenderness to palpation  EXTREMITY: no edema   NEURO: no gross deficits   PSYCH: normal affect

## 2021-06-28 NOTE — PROGRESS NOTES
Progress Notes by Helen Benitez MD at 3/4/2020  9:20 AM     Author: Helen Benitez MD Service: -- Author Type: Physician    Filed: 3/4/2020 10:15 AM Encounter Date: 3/4/2020 Status: Signed    : Helen Benitez MD (Physician)         Thank you, Dr. Garcia, for asking the [facility] team to see Ms. Diane Costa to evaluate heart disease.      Assessment/Recommendations   Assessment/Plan:    HTN - DBP still elevated but she has only been on the HCTZ a few days. I recommend she f/u with Dr. Jefferson in a week and if it is still elevated at that time then amlodipine can be added.    Family history of cardiomyopathy - confusing in that it sounds like there may have been some coronary disease involved and a cousin with a drug/alcohol problem. She will try to get more info and will get back to me.     Risk modification - mediterranean diet, exercise, CPAP use reviewed. LDL last year good at 86.     F/U pending her family history        History of Present Illness/Subjective    Ms. Diane Costa is a 35 y.o. female with AZALEA on CPAP, obesity and IBS who has a family history with both grandpa's needing CABG (one was a diabetic smoker), a grandma with a stroke and 4 out of 9 of her mom's siblings having what sounds like a cardiomyopathy. Lorena has been taking 4 supplements from holistic health provider (Albizia complex, Min Nicholas, Parotid PMG, and Intestinal Cleanse) for two months. Sunday morning, Lorena awoke with chest pain shooting to her back and left shoulder, it was much worse laying down. She came to the ER where EKG showed sinus tachycardia. Her BP was elevated but EKG was normal. Her pain resolved with maalox. CT chest was benign. She has not had recurrent pain. The ER started her on HCTZ and famotidine.     Diane started exercising recently, doing 30 minutes cardio and 30 minutes of weights three days per week. She feels well when she exercises. She did have pregnancy  induced hypertension 2 years ago which she feels hasn't really improved. Her BPs at home this week have improved with SBPs in the 120s but DBP still elevated around 100 mmHg. Her diet is erratic.          Physical Examination Review of Systems   Vitals:    03/04/20 0929   BP: (!) 124/100   Pulse: 96   Resp: 16     Body mass index is 31.78 kg/m .  Wt Readings from Last 3 Encounters:   03/04/20 209 lb (94.8 kg)   03/01/20 211 lb (95.7 kg)   12/18/19 (!) 222 lb 3.2 oz (100.8 kg)     [unfilled]  General Appearance:   no distress, normal body habitus   ENT/Mouth: membranes moist, no oral lesions or bleeding gums.      EYES:  no scleral icterus, normal conjunctivae   Neck: no carotid bruits or thyromegaly   Chest/Lungs:   lungs are clear to auscultation, no rales or wheezing, no sternal scar, equal chest wall expansion    Cardiovascular:   Regular. Normal first and second heart sounds with no murmurs, rubs, or gallops. The right radial, dorsalis pedis and posterior tibial pulses are intact.  The left radial, dorsalis pedis and posterior tibial pulses are intact. Jugular venous pressure flat, no edema bilaterally    Abdomen:  no organomegaly, masses, bruits, or tenderness; bowel sounds are present   Extremities: no cyanosis or clubbing   Skin: no xanthelasma, warm.    Neurologic: normal  bilateral, no tremors     Psychiatric: alert and oriented x3, calm     General: Night Sweats  Eyes: WNL  Ears/Nose/Throat: WNL  Lungs: Shortness of Breath, Snoring  Heart: Chest Pain, Arm Pain  Stomach: Heartburn, Nausea  Bladder: WNL  Muscle/Joints: Joint Pain  Skin: WNL  Nervous System: Daytime Sleepiness, Dizziness  Mental Health: Anxiety     Blood: Easy Bruising     Medical History  Surgical History Family History Social History   Past Medical History:   Diagnosis Date   ? 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 IBS (irritable bowel syndrome)     Non-specified    ? Mental  disorder     history of anxiety   ? Partial Thickness (Second Degree) Saavedra    ? Pregnancy-induced hypertension in third trimester 3/27/2018   ? Sleep apnea     Past Surgical History:   Procedure Laterality Date   ? GA KNEE SCOPE,DIAGNOSTIC      Description: Arthroscopy Knee Left;  Recorded: 12/14/2012;  Comments: x3; ; 1. 2005 Initial ligament repair; ; 2. 2007 LIgament tightening; ; 3. 2010 LIgament thightening, floating cart removal and excess bone removal.   ? GA LAP,CHOLECYSTECTOMY      Description: Cholecystectomy Laparoscopic;  Proc Date: 06/16/2012;  Comments: postpartum    Family History   Problem Relation Age of Onset   ? Varicose Veins Mother    ? Heart disease Father    ? Diabetes Father    ? Leukemia Maternal Aunt    ? Diabetes Paternal Uncle    ? Cancer Maternal Grandmother    ? Stroke Paternal Grandmother    ? Diabetes Paternal Grandfather    ? Cancer Cousin     Social History     Socioeconomic History   ? Marital status:      Spouse name: Not on file   ? Number of children: Not on file   ? Years of education: Not on file   ? Highest education level: Not on file   Occupational History   ? Not on file   Social Needs   ? Financial resource strain: Not on file   ? Food insecurity:     Worry: Not on file     Inability: Not on file   ? Transportation needs:     Medical: Not on file     Non-medical: Not on file   Tobacco Use   ? Smoking status: Never Smoker   ? Smokeless tobacco: Never Used   Substance and Sexual Activity   ? Alcohol use: No   ? Drug use: No   ? Sexual activity: Yes     Partners: Male     Birth control/protection: Pill   Lifestyle   ? Physical activity:     Days per week: Not on file     Minutes per session: Not on file   ? Stress: Not on file   Relationships   ? Social connections:     Talks on phone: Not on file     Gets together: Not on file     Attends [employer]ism service: Not on file     Active member of club or organization: Not on file     Attends meetings of clubs or  organizations: Not on file     Relationship status: Not on file   ? Intimate partner violence:     Fear of current or ex partner: Not on file     Emotionally abused: Not on file     Physically abused: Not on file     Forced sexual activity: Not on file   Other Topics Concern   ? Not on file   Social History Narrative   ? Not on file          Medications  Allergies   Current Outpatient Medications   Medication Sig Dispense Refill   ? cholecalciferol, vitamin D3, (VITAMIN D3) 2,000 unit cap Take 1 tablet by mouth daily.     ? famotidine (PEPCID) 20 MG tablet Take 1 tablet (20 mg total) by mouth 2 (two) times a day. 30 tablet 0   ? hydroCHLOROthiazide (HYDRODIURIL) 25 MG tablet Take 1 tablet (25 mg total) by mouth daily. 20 tablet 0   ? norgestimate-ethinyl estradiol (ORTHO TRI-CYCLEN LO, 28,) 0.18/0.215/0.25 mg-25 mcg Tab tablet Take 1 tablet by mouth daily. 84 tablet 3   ? venlafaxine (EFFEXOR-XR) 150 MG 24 hr capsule TAKE 1 CAPSULE BY MOUTH EVERY DAY 30 capsule 5     No current facility-administered medications for this visit.     Allergies   Allergen Reactions   ? Percocet [Oxycodone-Acetaminophen] Nausea And Vomiting   ? Blood-Group Specific Substance      History of anti-E and anti-Cw.  Expect delays in obtaining blood for transfusion.          Lab Results    Chemistry/lipid CBC Cardiac Enzymes/BNP/TSH/INR   Lab Results   Component Value Date    CHOL 151 04/12/2019    HDL 44 (L) 04/12/2019    LDLCALC 86 04/12/2019    TRIG 106 04/12/2019    CREATININE 0.73 03/01/2020    BUN 12 03/01/2020    K 4.1 03/01/2020     03/01/2020     03/01/2020    CO2 27 03/01/2020    Lab Results   Component Value Date    WBC 12.5 (H) 03/01/2020    HGB 14.4 03/01/2020    HCT 43.6 03/01/2020    MCV 87 03/01/2020     03/01/2020    Lab Results   Component Value Date    TROPONINI <0.01 03/01/2020    TSH 2.74 03/01/2020    INR 0.91 03/29/2018

## 2021-07-03 NOTE — ADDENDUM NOTE
Addendum Note by Pippa Bruce at 10/19/2017  1:24 PM     Author: Pippa Bruce Service: -- Author Type:     Filed: 10/19/2017  1:24 PM Encounter Date: 10/11/2017 Status: Signed    : Pippa Bruce ()    Addended by: PIPPA PARNELL on: 10/19/2017 01:24 PM        Modules accepted: Orders

## 2021-07-03 NOTE — ADDENDUM NOTE
Addendum Note by Pippa Bruce at 3/3/2018  9:48 AM     Author: Pippa Bruce Service: -- Author Type:     Filed: 3/3/2018  9:48 AM Encounter Date: 2/27/2018 Status: Signed    : Pippa Bruce ()    Addended by: PIPPA PARNELL on: 3/3/2018 09:48 AM        Modules accepted: Orders

## 2021-07-03 NOTE — ADDENDUM NOTE
Addendum Note by Pippa Bruce at 1/9/2018  9:08 AM     Author: Pippa Bruce Service: -- Author Type:     Filed: 1/9/2018  9:08 AM Encounter Date: 1/8/2018 Status: Signed    : Pippa Bruce ()    Addended by: PIPPA PARNELL on: 1/9/2018 09:08 AM        Modules accepted: Orders

## 2021-07-03 NOTE — ADDENDUM NOTE
Addendum Note by Ying Garcia at 2/6/2018  3:04 PM     Author: Ying Garcia Service: -- Author Type:     Filed: 2/6/2018  3:04 PM Encounter Date: 1/30/2018 Status: Signed    : Ying Garcia ()    Addended by: YING GARCIA on: 2/6/2018 03:04 PM        Modules accepted: Orders

## 2021-07-06 VITALS
DIASTOLIC BLOOD PRESSURE: 80 MMHG | WEIGHT: 226 LBS | OXYGEN SATURATION: 97 % | SYSTOLIC BLOOD PRESSURE: 128 MMHG | BODY MASS INDEX: 34.36 KG/M2 | HEART RATE: 74 BPM

## 2021-07-14 PROBLEM — Z34.81 ENCOUNTER FOR SUPERVISION OF OTHER NORMAL PREGNANCY IN FIRST TRIMESTER: Status: RESOLVED | Noted: 2017-10-12 | Resolved: 2018-05-17

## 2021-07-14 PROBLEM — O36.0920: Status: RESOLVED | Noted: 2017-10-13 | Resolved: 2018-05-17

## 2021-07-14 PROBLEM — Z34.90 PREGNANT: Status: RESOLVED | Noted: 2018-03-27 | Resolved: 2018-03-31

## 2021-07-14 PROBLEM — O13.3 PREGNANCY-INDUCED HYPERTENSION IN THIRD TRIMESTER: Status: RESOLVED | Noted: 2018-03-27 | Resolved: 2018-05-17

## 2021-08-11 ENCOUNTER — MYC MEDICAL ADVICE (OUTPATIENT)
Dept: SLEEP MEDICINE | Facility: CLINIC | Age: 36
End: 2021-08-11

## 2021-08-11 DIAGNOSIS — G47.33 OBSTRUCTIVE SLEEP APNEA: Primary | ICD-10-CM

## 2021-08-12 NOTE — TELEPHONE ENCOUNTER
Please inform patient the order is signed. Please obtain a copy of the DL prior to schedule follow up to see me.

## 2021-08-12 NOTE — TELEPHONE ENCOUNTER
LOV 02/15/2019 With E Dr. Drew. Order pended for review and approval if appropriate. Patient has been asked to make follow up appointment.     Sirena Miguel CMA on 8/12/2021 at 9:35 AM

## 2021-09-09 ENCOUNTER — TRANSFERRED RECORDS (OUTPATIENT)
Dept: HEALTH INFORMATION MANAGEMENT | Facility: CLINIC | Age: 36
End: 2021-09-09

## 2021-09-19 ENCOUNTER — HEALTH MAINTENANCE LETTER (OUTPATIENT)
Age: 36
End: 2021-09-19

## 2021-10-08 ENCOUNTER — TELEPHONE (OUTPATIENT)
Dept: SLEEP MEDICINE | Facility: CLINIC | Age: 36
End: 2021-10-08

## 2021-11-25 ENCOUNTER — TRANSFERRED RECORDS (OUTPATIENT)
Dept: HEALTH INFORMATION MANAGEMENT | Facility: CLINIC | Age: 36
End: 2021-11-25
Payer: COMMERCIAL

## 2022-01-09 ENCOUNTER — HEALTH MAINTENANCE LETTER (OUTPATIENT)
Age: 37
End: 2022-01-09

## 2022-01-21 ENCOUNTER — VIRTUAL VISIT (OUTPATIENT)
Dept: SLEEP MEDICINE | Facility: CLINIC | Age: 37
End: 2022-01-21
Payer: COMMERCIAL

## 2022-01-21 VITALS — HEIGHT: 68 IN | BODY MASS INDEX: 34.86 KG/M2 | WEIGHT: 230 LBS

## 2022-01-21 DIAGNOSIS — G47.33 OBSTRUCTIVE SLEEP APNEA: Primary | ICD-10-CM

## 2022-01-21 DIAGNOSIS — G47.10 HYPERSOMNIA: ICD-10-CM

## 2022-01-21 DIAGNOSIS — E66.09 CLASS 1 OBESITY DUE TO EXCESS CALORIES WITH SERIOUS COMORBIDITY AND BODY MASS INDEX (BMI) OF 34.0 TO 34.9 IN ADULT: ICD-10-CM

## 2022-01-21 DIAGNOSIS — E66.811 CLASS 1 OBESITY DUE TO EXCESS CALORIES WITH SERIOUS COMORBIDITY AND BODY MASS INDEX (BMI) OF 34.0 TO 34.9 IN ADULT: ICD-10-CM

## 2022-01-21 DIAGNOSIS — G47.00 INSOMNIA, UNSPECIFIED TYPE: ICD-10-CM

## 2022-01-21 DIAGNOSIS — G47.50 PARASOMNIA, UNSPECIFIED TYPE: ICD-10-CM

## 2022-01-21 PROBLEM — I10 BENIGN ESSENTIAL HYPERTENSION: Status: ACTIVE | Noted: 2020-03-04

## 2022-01-21 PROBLEM — F32.0 MILD MAJOR DEPRESSION (H): Status: ACTIVE | Noted: 2021-06-04

## 2022-01-21 PROBLEM — E55.9 VITAMIN D DEFICIENCY: Status: ACTIVE | Noted: 2022-01-21

## 2022-01-21 PROBLEM — F41.1 ANXIETY STATE: Status: ACTIVE | Noted: 2020-04-06

## 2022-01-21 PROBLEM — R20.2 PARESTHESIA: Status: ACTIVE | Noted: 2020-04-06

## 2022-01-21 PROCEDURE — 99215 OFFICE O/P EST HI 40 MIN: CPT | Mod: 95 | Performed by: INTERNAL MEDICINE

## 2022-01-21 RX ORDER — HYDROXYZINE HYDROCHLORIDE 25 MG/1
TABLET, FILM COATED ORAL
COMMUNITY
Start: 2021-07-21 | End: 2022-08-09

## 2022-01-21 RX ORDER — GABAPENTIN 300 MG/1
CAPSULE ORAL
COMMUNITY
Start: 2020-07-09

## 2022-01-21 RX ORDER — BUPROPION HYDROCHLORIDE 300 MG/1
TABLET ORAL
COMMUNITY
Start: 2021-10-08 | End: 2022-08-09

## 2022-01-21 RX ORDER — ASPIRIN 81 MG/1
81 TABLET, CHEWABLE ORAL
COMMUNITY
Start: 2020-07-09

## 2022-01-21 RX ORDER — VENLAFAXINE HYDROCHLORIDE 75 MG/1
225 CAPSULE, EXTENDED RELEASE ORAL
COMMUNITY
Start: 2020-10-12

## 2022-01-21 RX ORDER — METOPROLOL SUCCINATE 100 MG/1
100 TABLET, EXTENDED RELEASE ORAL
COMMUNITY
Start: 2021-04-25 | End: 2022-02-07

## 2022-01-21 ASSESSMENT — SLEEP AND FATIGUE QUESTIONNAIRES
HOW LIKELY ARE YOU TO NOD OFF OR FALL ASLEEP WHILE WATCHING TV: HIGH CHANCE OF DOZING
HOW LIKELY ARE YOU TO NOD OFF OR FALL ASLEEP WHILE LYING DOWN TO REST IN THE AFTERNOON WHEN CIRCUMSTANCES PERMIT: HIGH CHANCE OF DOZING
HOW LIKELY ARE YOU TO NOD OFF OR FALL ASLEEP IN A CAR, WHILE STOPPED FOR A FEW MINUTES IN TRAFFIC: WOULD NEVER DOZE
HOW LIKELY ARE YOU TO NOD OFF OR FALL ASLEEP WHILE SITTING AND READING: MODERATE CHANCE OF DOZING
HOW LIKELY ARE YOU TO NOD OFF OR FALL ASLEEP WHILE SITTING INACTIVE IN A PUBLIC PLACE: SLIGHT CHANCE OF DOZING
HOW LIKELY ARE YOU TO NOD OFF OR FALL ASLEEP WHILE SITTING QUIETLY AFTER LUNCH WITHOUT ALCOHOL: HIGH CHANCE OF DOZING
HOW LIKELY ARE YOU TO NOD OFF OR FALL ASLEEP WHEN YOU ARE A PASSENGER IN A CAR FOR AN HOUR WITHOUT A BREAK: HIGH CHANCE OF DOZING
HOW LIKELY ARE YOU TO NOD OFF OR FALL ASLEEP WHILE SITTING AND TALKING TO SOMEONE: WOULD NEVER DOZE

## 2022-01-21 ASSESSMENT — MIFFLIN-ST. JEOR: SCORE: 1781.77

## 2022-01-21 NOTE — NURSING NOTE
Pt stated that she is taking venlafaxine 225 mg.    Pt stated that she is taking blood pressure meds and an aspirin 81 mg.    Vonda Costello VF

## 2022-01-21 NOTE — PATIENT INSTRUCTIONS
Online CBT-I       Research indicates that online CBT-I can be as effective as in-person therapy for motivated patients. It requires comfort with online learning and confidence  that making changes in sleep habits can improve your sleep.    Online CBT-I is not for everyone.  Contraindications include individuals with seizure disorders, bipolar disorder, unstable medical or mental health conditions,  risk of falling, or are pregnant.    Our online CBT-I program is a collaboration between our [facility] and the [facility]. Different from other online wellness programs, patients have the option to also incorporate live virtual or in-person follow-up to the program.     The cost for an entire 6 week program is $40.     As part of registration, we ask that you sign a consent for the [facility] to share your online CBT-I information with our sleep program.  To optimize telephone support we ask that you also agree to share data from your daily sleep diaries. This can be done by entering the sharing code BrightQube.    The online CBT-I program is formatted for use on computers and mobile devices.    Copy and past the link below which will take you to the following landing page to register for online CBT-I.:    [email or web address]                   Insomnia and Behavioral Sleep Medicine Program    The [facility] Insomnia and Behavioral Sleep Medicine Program provides non-drug treatment for sleep problems including:      Cognitive-behavioral Therapies for Insomnia (CBT-I)    Management of Shift-work and Jet Lag    Management of Delayed, Advanced and Irregular Circadian Rhythm Sleep Disorders    Imagery Rehearsal Therapy (IRT) for Nightmare Disorder    PAP Therapy Desensitization    You have been referred for consultation with a sleep psychologist who specializes in behavioral sleep medicine and treatment of insomnia.  The [facility]omnia and  Behavioral Sleep Medicine Program offers individualized telehealth services through our [facility] and online CBT-I.    Preparing for your Consultation    You will need to keep a Sleep Diary for at least a week prior to your visit. Your estimates should be your recollection of your last night's sleep.  Avoid watching the clock or recording data during the night.    Online Sleep Diary    We strongly recommend the convenient [facility] Better Sleep online sleep diary to track your sleep as you prepare for your consultation.  The online sleep diary can be used on your mobile phone, tablet or computer to keeps track of important data about your sleep.  Simply copy and paste the following link into your browser and save it as a favorite for daily recording in the morning:    Https://www.veterantraining.va.gov/apps/insomnia/resources/interactivities/diary/pages/index.html     Do not watch or monitor the clock in the middle of the night while keeping your sleep diary. The online program allows you to e-mail a spreadsheet of your data to yourself.  Make sure to have your data available at the time of your first visit.     [facility] Sleep Diary    You can also track your sleep using the [facility] paper sleep diary.  You can fax your sleep diaries to [phone] prior to your consultation or have it with you at the time of your consultation.              CBT-I Frequently Asked Questions    What is CBT-I?    Cognitive Behavioral Therapy for Insomnia, also known as CBT-I, is a highly effective non-drug treatment for insomnia. The American College of Physicians recommends CBT-I as the first treatment for chronic insomnia.  Research has shown CBT-I to be safer and more effective long term than sleeping pills.    What does CBT-I involve?     CBT-I targets behaviors that lead to chronic insomnia:    Habits that weaken the bed as a cue for sleep    Habits that weaken your body's sleep drive and  sleep/wake clock     Unhelpful sleep thoughts that increase sleep-related worry and arousal.    The process works like a 4-6 session training program that provides you with the information and coaching needed to implement proven strategies to get a better night's sleep.    People often see improvement in their sleep within a few weeks. Research shows if you keep practicing the skills you learn your sleep is likely to continue to improve 6-12 months after treatment.    Does this program prescribe or manage sleep medication?    No.  Your prescribing provider is responsible to assist you in managing your sleep medications.  Some people choose to stop using sleep medication prior to or during CBT-I.  Our program can work with your prescribing provider to help reduce or eliminate use of sleep medications.     Getting Started Today!    As you prepare for your sleep consultation, we recommend you consider making the following changes to your sleep habits if you haven't already done so:    ? Reduce your consumption of caffeine and alcohol.  Both can disrupt sleep and make strengthening your sleep more difficult.  Specifically:    - Avoid caffeine within 6 hours of bedtime   - No more than 3 caffeinated beverages per day (e.g. 8 oz. cup coffee or 12 oz. cup soda)            - No alcohol within 3 hours of bedtime    ? Make sure your bedroom is quiet, comfortable and dark.  Noise, light and an uncomfortable sleep space can harm your sleep.      ? Keep the same sleep schedule 7 days a week.unless you do shift work.      David and Online CBT-I     Research indicates that david-based and online CBT-I can be effective for some individuals. It requires comfort with david-based or online learning and confidence that making changes in sleep habits can improve your sleep.    Digital CBT-I programs are not for everyone.  Contraindications include:       seizure disorders,     bipolar disorder,     unstable medical or mental health  conditions,     Frailty or risk of falling    pregnant    You should consult a sleep specialist before using these resources if you have:      Sleep Apnea    Restless Leg Syndrome    Sleep Walking    REM behavior disorder    Night Terrors    Excessive Daytime Sleepiness    Are engaged in shift work    Or are using prescription sleep medication    Insomnia  training program    Insomnia  is a mobile david developed by the Department of 's Affairs involving a core 5 week training program The application is downloadable for free on Android and Apple phones.             Our Online CBT-I program    Our online CBT-I program is formatted for use on computers and mobile devices.  The cost for an entire 6 week program is $40.           If you opt to try online CBT-I, we ask that you sign a consent for the [facility] to share your online CBT-I information with our sleep program.  To optimize care coordination, we ask that you also agree to share data from your daily sleep diaries. This can be done by entering the sharing code Biorasis.    To get started, copy and past the link below which will take you to the landing page to register:            [email or web address]                                 Self-help workbooks for insomnia    If you have found self-help books useful in the past for changing health-related habits, you may want to consider one of the following resources:    Say Ilan to Insomnia:The Six-Week, Drug-Free Program Developed At [employer].  Matias Sellers MD. Available in paperback, Leif and audiobook.        Overcoming Insomnia: A Cognitive-Behavioral Therapy Approach, Workbook.  Aquilino Weber, PhD  and Nell Dover, PhD.  Available in paperback and Leif.        Quiet Your Mind and Get to Sleep: Solutions to Insomnia for Those with Depression, Anxiety, or Chronic Pain.  Arlyn Mallory, PhD and Nell Dover, PhD.  Available in paperback and  Leif                    For general sleep health questions:   http://sleepeducation.org    For tips about PAP and COVID-19:  https://www.thoracic.org/patients/patient-resources/resources/covid-19-and-home-pap-therapy.pdf    For general info about COVID-19 including vaccines:  https://[employer].org/covid19        Continue PAP therapy every night, for all hours that you are sleeping (including naps.)  As always, try to get at least 8 hours of sleep or more each day, keep a regular sleep schedule, and avoid sleep deprivation. Avoid alcohol.    Reasons that you might need a change to your pressure therapy would be weight gain or loss, waking having inadvertently removed your PAP overnight, having previously felt refreshed by sleep with CPAP use and now waking un-refreshed, and return of daytime sleepiness. Also, the development of new medical problems  (such as heart failure, stroke, medications such as narcotics) can sometimes affect breathing at night and change your PAP therapy needs.    Please bring PAP with you if you are hospitalized.  If anticipating surgery be sure to discuss with your surgeon that you have sleep apnea and use PAP therapy.      Maintain your equipment as recommended which includes routine cleaning and replacement of supplies.      Call DME for any questions regarding supplies or maintenance.    [facility], [facility] [phone]      Do not drive on engage in potentially dangerous activities if feeling sleepy.    Please follow up in sleep clinic again in 6 months.        Tips for your PAP use-    Mask fitting tips  Mask fitting exercise:    To improve your mask seal and your mobility at night, put mask on and secure in place.  Lie down in bed with full pressure and roll to one side, adjust headgear while in that position to eliminate any leaks. Repeat process rolling to other side.     The mask seal does not have to be perfect:   CPAP  machines are designed to make up for small leaks. However, you will not tolerate leaks blowing in your eyes so you will need to adjust.   Any leak should only be near or at the bottom of the mask.  We expect your mask to leak slightly at night.    Do not over-tighten the headgear straps, tighter IS NOT better, we expect minimal leak.    First try re-positioning the mask or headgear before tightening the headgear straps.  Mask leaks are expected due to changing sleeping positions. Try pulling the mask away from your skin allowing the cushion to re-inflate will minimize the leak.  If you struggle for a good fit, try turning the CPAP off and then readjust the mask by pulling it away from your face and then turning back on the CPAP.        Humidifier tips  Humidifiers can be adjusted to increase or decrease the amount of moisture according to your comfort level. You may need to adjust this frequently at first, but then might only change it with seasonal weather changes.     Try INCREASING the humidity if:  You experience a dry, irritated nasal passage or throat.  You have a runny, drippy nose or sneezing fits after using CPAP.  You experience nasal congestion during or after CPAP use.    Try DECREASING the humidity if:  You have excessive condensation or  rain out  in the tubing or mask.  Otherwise keep the tubing warm during the night by running it underneath the blankets or pillow.      Clinic visit after initial PAP set-up   Bring your equipment with you to your 5-8 week follow up clinic visit.  We will be extracting your data from the machine if not available from the cloud based modem.        Travel  Always take your equipment with you when you travel.  If you fly with your equipment bring it on with you as a carry on.  Medical equipment does not count as a carry on.    If you travel international the machines take 110-240v.  The only adapter needed is the adapter that will fit into the receptacle (outlet).    You  may also want to bring an extension cord as many hotel rooms have limited outlets at the bedside.  Do not travel with water in your humidifier chamber.     Cleaning and Maintenance Guidelines    Equipment Frequency Cleaning Method   Mask First Day    Daily      Weekly Soak mask in hot soapy water for 30 minutes, rinse and air dry.  Wipe nasal cushion with a hot soapy (Ivory, baby shampoo) cloth and rinse.  Baby wipes may also be used.  Do not use anti-bacterial soaps,Kristal  liquid soap, rubbing alcohol, bleach or ammonia.  Wash frame in hot soapy water (Ivory, baby shampoo) rinse and let air dry   Headgear Biweekly Wash in hot soapy water, rinse and air dry   Reusable Gray Filter Weekly Wash in hot soapy water, rinse, put in towel squeeze moisture out, let air dry   Disposable White Filter Check Weekly Replace when brown or gray in color; at least every 2 to 3 months   Humidifier Chamber Daily    Weekly Empty distilled water from humidifier and let air dry    Hand wash in hot soapy water, rinse and air dry   Tubing Weekly Wash in hot soapy water, rinse and let air dry   Mask, Tubing and Humidifier Chamber As needed Disinfect: Soak in 1 part distilled white vinegar to 3 parts hot water for 30 minutes, rinse well and air dry  Not the material headgear        MASK AND SUPPLY REORDERING and EQUIPMENT NEEDS through your DME and per your insurance  Reminder: Most insurance companies will allow for a new mask, headgear, tubing, and reusable gray filter every six months.  Disposable white ultra-fine filters are covered monthly.      HOME AND SAFETY INSTRUCTIONS    Do not use frayed or cracked electrical cords, multi plug adaptors, or switched receptacles    Do not immerse electrical equipment into water    Assure that electrical cords do not become a tripping hazard

## 2022-01-21 NOTE — PROGRESS NOTES
Diane is a 36 year old who is being evaluated via a billable video visit.      How would you like to obtain your AVS? MyChart  If the video visit is dropped, the invitation should be resent by: Text to cell phone: [phone]  Will anyone else be joining your video visit? No      Video Start Time: 4:30 PM  Video-Visit Details    Type of service:  Video Visit    Video End Time:4:59 PM    Originating Location (pt. Location): Home    Distant Location (provider location):  [facility]     Platform used for Video Visit: [employer]     Chief complaint: Excessive sleepiness, restarting CPAP    History of Present Illness: 36-year-old female last office visit in sleep medicine February 2019.  She was diagnosed with mild obstructive sleep apnea in 2014.  She tried an oral appliance for a while which was initially effective but eventually she started snoring through the appliance.  She also was noticing more sleepiness during the day.  She then switched to CPAP therapy.  She found CPAP therapy effective for a long time.  But eventually she tried to stop it to see if she could still sleep well without it.  She did sleep well for a while but then symptoms have gradually returned.  She had had a significant weight loss of about 30 pounds but has since gained it back.  Her current weight is similar to the time of her sleep study.     Prior to resuming CPAP in the last month she was told by her  about loud snoring and observed gasps at night.  She has resumed CPAP but has had some intermittent use.  COVID has just gone through the household.  She takes care of her 3 small children and has been up a lot with them.  She also has difficulty getting in the routine of using CPAP.  She admits that sometimes she falls asleep without it.  But most often she has difficulty initiating sleep.  She will be very tired around 10 PM or so but then not be able to fall asleep until after 11.  She may listen to  music or scroll on her phone.      She is also been told about kicking and moving at night as well as having elaborate sleep talking.  She is not on any actual sleepwalking.  She denies symptoms of restless legs.  No nightmares. She being treated for anxiety and depression and recently had a medication change.  It is possible that some of the movement activity at night started after venlafaxine was started.    She does not drink caffeinated beverages afternoon.  She drinks about 20 to 30 ounce coffee in the morning.  No excessive alcohol use no cannabis products.  She is not taking any over-the-counter sleep aids although she has tried melatonin without much benefit.        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     Fatty liver     hx of transaminase elevations 2020.     GERD (gastroesophageal reflux disease)     controlled w/ Pepcid     History of blood transfusion reaction     has antibodies.     History of transfusion      IBS (irritable bowel syndrome)     Non-specified      Left knee pain     hx of patellar ligament repair (cadaver)     Mental disorder     history of anxiety     Mild major depression (H) 6/4/2021     Pregnancy-induced hypertension in third trimester 3/27/2018     Sleep apnea      Vertebral artery dissection (H)     left, on lifelong baby ASA 81mg now.       Allergies   Allergen Reactions     Percocet [Oxycodone-Acetaminophen] Nausea and Vomiting     Blood-Group Specific Substance Unknown     History of anti-E and anti-Cw.  Expect delays in obtaining blood for transfusion.   "      Current Outpatient Medications   Medication     INCASSIA 0.35 MG tablet     norethindrone-ethinyl estradiol (FEMHRT 1/5) 1-5 MG-MCG per tablet     sertraline (ZOLOFT) 100 MG tablet     No current facility-administered medications for this visit.       Social History     Socioeconomic History     Marital status:      Spouse name: Not on file     Number of children: Not on file     Years of education: Not on file     Highest education level: Not on file   Occupational History     Not on file   Tobacco Use     Smoking status: Never Smoker     Smokeless tobacco: Never Used   Substance and Sexual Activity     Alcohol use: Yes     Alcohol/week: 0.0 - 1.0 standard drinks     Drug use: No     Sexual activity: Yes     Partners: Male     Birth control/protection: Pill   Other Topics Concern     Not on file   Social History Narrative     Not on file     Social Determinants of Health     Financial Resource Strain: Not on file   Food Insecurity: Not on file   Transportation Needs: Not on file   Physical Activity: Not on file   Stress: Not on file   Social Connections: Not on file   Intimate Partner Violence: Not on file   Housing Stability: Not on file       Family History   Problem Relation Age of Onset     Varicose Veins Mother      Obesity Mother      Heart Disease Father      Diabetes Father      Hypertension Father      Leukemia Maternal Aunt      Diabetes Paternal Uncle      Cancer Maternal Grandmother      Cerebrovascular Disease Paternal Grandmother      Diabetes Paternal Grandfather      CABG Paternal Grandfather      Cancer Cousin      Diabetes Type 2  Cousin      CABG Maternal Great-Grandfather      Heart Failure Maternal Great-Grandfather      Asthma Sister            EXAM:  Ht 1.727 m (5' 8\")   Wt 104.3 kg (230 lb)   BMI 34.97 kg/m    GENERAL: Alert and no distress  EYES: Eyes grossly normal to inspection.  No discharge or erythema, or obvious scleral/conjunctival abnormalities.  RESP: No audible " wheeze, cough, or visible cyanosis.  No visible retractions or increased work of breathing.    SKIN: Visible skin clear. No significant rash, abnormal pigmentation or lesions.  NEURO: Cranial nerves grossly intact.  Mentation and speech appropriate for age.  PSYCH: Mentation appears normal, affect normal, judgement and insight intact, normal speech and appearance well-groomed.       PSG [facility] 2/28/2014  Weight 232 lbs BMI 36.4  AHI 11.9, KENDAL 18.4, Lowest O2 SAT 85%    ResMed   PAP download from 12/18/2021 to 1/16/2022 reviewed:  Per cent of days used greater than 4 Hours 60% (minimum goal greater than 70%)  Average use on days used: 5 hours 41 min  Settings: Min EPAP 5 cmH2O    Max EPAP 15 cmH2O  Pressures delivered 90/95th percentile for pressure 11.6 cmH2O  Average AHI 2.5 events per hour (goal less than 5)  Leak acceptable    TSH   Date Value Ref Range Status   06/04/2021 1.93 0.30 - 5.00 uIU/mL Final         ASSESSMENT:  36-year-old female with history of obesity, hypertension, depression and anxiety, overall mild obstructive sleep apnea, incompletely treated at this time.  She is having issues with insomnia that could be contributing to her decreased compliance.  She may benefit from cognitive behavioral therapy for insomnia.  She had some parasomnia with elaborate sleep talking and abnormal movement activity at night that could be temporally related to the initiation of venlafaxine.  However untreated sleep apnea can contribute to some parasomnia.    PLAN:  We reviewed the potential benefits of cognitive behavioral therapy for insomnia..  Patient was provided referral for online and in person CBT-I.  Patient is instructed on the importance of establishing a regular routine with the CPAP and using it all night every night.  No changes to the settings recommended at this time.  Patient will inform her psychiatry team about possible medication effect if it continues despite using CPAP regularly.  Encourage  efforts at weight loss.  Follow-up in sleep medicine in 6 months.      45 minutes spent on the date of the encounter doing chart review, history and exam, documentation and further activities per the note    Ricarda Fish M.D.  Pulmonary/Critical Care/Sleep Medicine    [facility]   Floor [address], [address]   [address] [place] [address]   Appointments: [phone]    The above note was dictated using voice recognition software and may include typographical errors. Please contact the author for any clarifications.

## 2022-01-27 ENCOUNTER — LAB (OUTPATIENT)
Dept: LAB | Facility: CLINIC | Age: 37
End: 2022-01-27
Payer: COMMERCIAL

## 2022-01-27 DIAGNOSIS — R53.82 CHRONIC FATIGUE: ICD-10-CM

## 2022-01-27 LAB
ERYTHROCYTE [DISTWIDTH] IN BLOOD BY AUTOMATED COUNT: 12.7 % (ref 10–15)
HCT VFR BLD AUTO: 41.8 % (ref 35–47)
HGB BLD-MCNC: 14 G/DL (ref 11.7–15.7)
MCH RBC QN AUTO: 29.7 PG (ref 26.5–33)
MCHC RBC AUTO-ENTMCNC: 33.5 G/DL (ref 31.5–36.5)
MCV RBC AUTO: 89 FL (ref 78–100)
PLATELET # BLD AUTO: 240 10E3/UL (ref 150–450)
RBC # BLD AUTO: 4.72 10E6/UL (ref 3.8–5.2)
TSH SERPL DL<=0.005 MIU/L-ACNC: 1.39 UIU/ML (ref 0.3–5)
WBC # BLD AUTO: 7.9 10E3/UL (ref 4–11)

## 2022-01-27 PROCEDURE — 36415 COLL VENOUS BLD VENIPUNCTURE: CPT

## 2022-01-27 PROCEDURE — 82306 VITAMIN D 25 HYDROXY: CPT

## 2022-01-27 PROCEDURE — 84443 ASSAY THYROID STIM HORMONE: CPT

## 2022-01-27 PROCEDURE — 85027 COMPLETE CBC AUTOMATED: CPT

## 2022-01-28 LAB — DEPRECATED CALCIDIOL+CALCIFEROL SERPL-MC: 41 UG/L (ref 30–80)

## 2022-02-05 DIAGNOSIS — I10 ESSENTIAL (PRIMARY) HYPERTENSION: ICD-10-CM

## 2022-02-07 RX ORDER — METOPROLOL SUCCINATE 100 MG/1
TABLET, EXTENDED RELEASE ORAL
Qty: 90 TABLET | Refills: 1 | Status: SHIPPED | OUTPATIENT
Start: 2022-02-07 | End: 2022-08-09

## 2022-02-07 NOTE — TELEPHONE ENCOUNTER
"Last Written Prescription Date:  4/25/21  Last Fill Quantity: 90,  # refills: 2   Last office visit provider:  6/4/21         Requested Prescriptions   Pending Prescriptions Disp Refills     metoprolol succinate ER (TOPROL-XL) 100 MG 24 hr tablet [Pharmacy Med Name: METOPROLOL SUCC  MG TAB] 90 tablet 2     Sig: TAKE 1 TABLET BY MOUTH EVERY DAY       Beta-Blockers Protocol Passed - 2/5/2022  8:25 AM        Passed - Blood pressure under 140/90 in past 12 months     BP Readings from Last 3 Encounters:   06/04/21 128/80   01/27/21 118/88   10/09/20 (!) 132/90                 Passed - Patient is age 6 or older        Passed - Recent (12 mo) or future (30 days) visit within the authorizing provider's specialty     Patient has had an office visit with the authorizing provider or a provider within the authorizing providers department within the previous 12 mos or has a future within next 30 days. See \"Patient Info\" tab in inbasket, or \"Choose Columns\" in Meds & Orders section of the refill encounter.              Passed - Medication is active on med list             Adriana Ohara RN 02/07/22 11:22 AM  "

## 2022-05-19 ENCOUNTER — APPOINTMENT (OUTPATIENT)
Dept: URBAN - METROPOLITAN AREA CLINIC 260 | Age: 37
Setting detail: DERMATOLOGY
End: 2022-05-21

## 2022-05-19 VITALS — WEIGHT: 225 LBS | HEIGHT: 68 IN

## 2022-05-19 DIAGNOSIS — L57.8 OTHER SKIN CHANGES DUE TO CHRONIC EXPOSURE TO NONIONIZING RADIATION: ICD-10-CM

## 2022-05-19 DIAGNOSIS — L82.1 OTHER SEBORRHEIC KERATOSIS: ICD-10-CM

## 2022-05-19 DIAGNOSIS — L81.4 OTHER MELANIN HYPERPIGMENTATION: ICD-10-CM

## 2022-05-19 DIAGNOSIS — Z71.89 OTHER SPECIFIED COUNSELING: ICD-10-CM

## 2022-05-19 DIAGNOSIS — D22 MELANOCYTIC NEVI: ICD-10-CM

## 2022-05-19 DIAGNOSIS — D18.0 HEMANGIOMA: ICD-10-CM

## 2022-05-19 PROBLEM — D18.01 HEMANGIOMA OF SKIN AND SUBCUTANEOUS TISSUE: Status: ACTIVE | Noted: 2022-05-19

## 2022-05-19 PROBLEM — D22.5 MELANOCYTIC NEVI OF TRUNK: Status: ACTIVE | Noted: 2022-05-19

## 2022-05-19 PROCEDURE — OTHER COUNSELING: OTHER

## 2022-05-19 PROCEDURE — OTHER MIPS QUALITY: OTHER

## 2022-05-19 PROCEDURE — 99203 OFFICE O/P NEW LOW 30 MIN: CPT

## 2022-05-19 ASSESSMENT — LOCATION DETAILED DESCRIPTION DERM
LOCATION DETAILED: RIGHT SUPERIOR MEDIAL UPPER BACK
LOCATION DETAILED: LEFT MEDIAL UPPER BACK
LOCATION DETAILED: INFERIOR THORACIC SPINE
LOCATION DETAILED: LEFT INFERIOR MEDIAL UPPER BACK

## 2022-05-19 ASSESSMENT — LOCATION SIMPLE DESCRIPTION DERM
LOCATION SIMPLE: RIGHT UPPER BACK
LOCATION SIMPLE: UPPER BACK
LOCATION SIMPLE: LEFT UPPER BACK

## 2022-05-19 ASSESSMENT — LOCATION ZONE DERM: LOCATION ZONE: TRUNK

## 2022-05-27 ENCOUNTER — LAB (OUTPATIENT)
Dept: LAB | Facility: CLINIC | Age: 37
End: 2022-05-27
Payer: COMMERCIAL

## 2022-05-27 DIAGNOSIS — L83 ACANTHOSIS NIGRICANS: ICD-10-CM

## 2022-05-27 DIAGNOSIS — R23.2 HOT FLASHES: ICD-10-CM

## 2022-05-27 LAB
BASOPHILS # BLD AUTO: 0 10E3/UL (ref 0–0.2)
BASOPHILS NFR BLD AUTO: 0 %
EOSINOPHIL # BLD AUTO: 0.1 10E3/UL (ref 0–0.7)
EOSINOPHIL NFR BLD AUTO: 2 %
ERYTHROCYTE [DISTWIDTH] IN BLOOD BY AUTOMATED COUNT: 12.3 % (ref 10–15)
ESTRADIOL SERPL-MCNC: 22 PG/ML
FSH SERPL-ACNC: 8.9 MIU/ML
HBA1C MFR BLD: 5.2 % (ref 0–5.6)
HCT VFR BLD AUTO: 43.9 % (ref 35–47)
HGB BLD-MCNC: 14.6 G/DL (ref 11.7–15.7)
IMM GRANULOCYTES # BLD: 0 10E3/UL
IMM GRANULOCYTES NFR BLD: 0 %
LH SERPL-ACNC: 4.1 MIU/ML
LYMPHOCYTES # BLD AUTO: 1.6 10E3/UL (ref 0.8–5.3)
LYMPHOCYTES NFR BLD AUTO: 18 %
MCH RBC QN AUTO: 29.4 PG (ref 26.5–33)
MCHC RBC AUTO-ENTMCNC: 33.3 G/DL (ref 31.5–36.5)
MCV RBC AUTO: 89 FL (ref 78–100)
MONOCYTES # BLD AUTO: 0.6 10E3/UL (ref 0–1.3)
MONOCYTES NFR BLD AUTO: 6 %
NEUTROPHILS # BLD AUTO: 6.5 10E3/UL (ref 1.6–8.3)
NEUTROPHILS NFR BLD AUTO: 73 %
PLATELET # BLD AUTO: 224 10E3/UL (ref 150–450)
RBC # BLD AUTO: 4.96 10E6/UL (ref 3.8–5.2)
WBC # BLD AUTO: 8.9 10E3/UL (ref 4–11)

## 2022-05-27 PROCEDURE — 82670 ASSAY OF TOTAL ESTRADIOL: CPT

## 2022-05-27 PROCEDURE — 85025 COMPLETE CBC W/AUTO DIFF WBC: CPT

## 2022-05-27 PROCEDURE — 83001 ASSAY OF GONADOTROPIN (FSH): CPT

## 2022-05-27 PROCEDURE — 36415 COLL VENOUS BLD VENIPUNCTURE: CPT

## 2022-05-27 PROCEDURE — 83002 ASSAY OF GONADOTROPIN (LH): CPT

## 2022-05-27 PROCEDURE — 83036 HEMOGLOBIN GLYCOSYLATED A1C: CPT

## 2022-07-18 ENCOUNTER — MYC MEDICAL ADVICE (OUTPATIENT)
Dept: FAMILY MEDICINE | Facility: CLINIC | Age: 37
End: 2022-07-18

## 2022-08-09 ENCOUNTER — OFFICE VISIT (OUTPATIENT)
Dept: FAMILY MEDICINE | Facility: CLINIC | Age: 37
End: 2022-08-09
Payer: COMMERCIAL

## 2022-08-09 VITALS
BODY MASS INDEX: 34.71 KG/M2 | HEART RATE: 87 BPM | WEIGHT: 229 LBS | DIASTOLIC BLOOD PRESSURE: 78 MMHG | TEMPERATURE: 98.4 F | HEIGHT: 68 IN | OXYGEN SATURATION: 97 % | SYSTOLIC BLOOD PRESSURE: 126 MMHG

## 2022-08-09 DIAGNOSIS — I10 ESSENTIAL (PRIMARY) HYPERTENSION: ICD-10-CM

## 2022-08-09 DIAGNOSIS — I10 ESSENTIAL HYPERTENSION: ICD-10-CM

## 2022-08-09 DIAGNOSIS — R20.2 TINGLING IN EXTREMITIES: ICD-10-CM

## 2022-08-09 DIAGNOSIS — Z30.41 ENCOUNTER FOR SURVEILLANCE OF CONTRACEPTIVE PILLS: ICD-10-CM

## 2022-08-09 DIAGNOSIS — Z13.220 LIPID SCREENING: ICD-10-CM

## 2022-08-09 DIAGNOSIS — R23.2 HOT FLASHES: ICD-10-CM

## 2022-08-09 DIAGNOSIS — Z11.59 NEED FOR HEPATITIS C SCREENING TEST: ICD-10-CM

## 2022-08-09 DIAGNOSIS — Z00.00 ENCOUNTER FOR ROUTINE HISTORY AND PHYSICAL EXAMINATION OF ADULT: Primary | ICD-10-CM

## 2022-08-09 LAB
ALBUMIN SERPL BCG-MCNC: 4.7 G/DL (ref 3.5–5.2)
ALP SERPL-CCNC: 62 U/L (ref 35–104)
ALT SERPL W P-5'-P-CCNC: 50 U/L (ref 10–35)
ANION GAP SERPL CALCULATED.3IONS-SCNC: 11 MMOL/L (ref 7–15)
AST SERPL W P-5'-P-CCNC: 32 U/L (ref 10–35)
BILIRUB SERPL-MCNC: 0.2 MG/DL
BUN SERPL-MCNC: 11.3 MG/DL (ref 6–20)
CALCIUM SERPL-MCNC: 9.9 MG/DL (ref 8.6–10)
CHLORIDE SERPL-SCNC: 102 MMOL/L (ref 98–107)
CHOLEST SERPL-MCNC: 180 MG/DL
CREAT SERPL-MCNC: 0.8 MG/DL (ref 0.51–0.95)
DEPRECATED HCO3 PLAS-SCNC: 26 MMOL/L (ref 22–29)
FSH SERPL IRP2-ACNC: 1.5 MIU/ML
GFR SERPL CREATININE-BSD FRML MDRD: >90 ML/MIN/1.73M2
GLUCOSE SERPL-MCNC: 89 MG/DL (ref 70–99)
HDLC SERPL-MCNC: 44 MG/DL
LDLC SERPL CALC-MCNC: 108 MG/DL
LH SERPL-ACNC: 5 MIU/ML
NONHDLC SERPL-MCNC: 136 MG/DL
POTASSIUM SERPL-SCNC: 4.6 MMOL/L (ref 3.4–5.3)
PROT SERPL-MCNC: 7.4 G/DL (ref 6.4–8.3)
SODIUM SERPL-SCNC: 139 MMOL/L (ref 136–145)
TRIGL SERPL-MCNC: 141 MG/DL
TSH SERPL DL<=0.005 MIU/L-ACNC: 1.94 UIU/ML (ref 0.3–4.2)
VIT B12 SERPL-MCNC: 515 PG/ML (ref 232–1245)

## 2022-08-09 PROCEDURE — 84443 ASSAY THYROID STIM HORMONE: CPT | Performed by: FAMILY MEDICINE

## 2022-08-09 PROCEDURE — 86803 HEPATITIS C AB TEST: CPT | Performed by: FAMILY MEDICINE

## 2022-08-09 PROCEDURE — 83002 ASSAY OF GONADOTROPIN (LH): CPT | Performed by: FAMILY MEDICINE

## 2022-08-09 PROCEDURE — 83001 ASSAY OF GONADOTROPIN (FSH): CPT | Performed by: FAMILY MEDICINE

## 2022-08-09 PROCEDURE — 36415 COLL VENOUS BLD VENIPUNCTURE: CPT | Performed by: FAMILY MEDICINE

## 2022-08-09 PROCEDURE — 82607 VITAMIN B-12: CPT | Performed by: FAMILY MEDICINE

## 2022-08-09 PROCEDURE — 80053 COMPREHEN METABOLIC PANEL: CPT | Performed by: FAMILY MEDICINE

## 2022-08-09 PROCEDURE — 80061 LIPID PANEL: CPT | Performed by: FAMILY MEDICINE

## 2022-08-09 PROCEDURE — 99395 PREV VISIT EST AGE 18-39: CPT | Performed by: FAMILY MEDICINE

## 2022-08-09 RX ORDER — ARIPIPRAZOLE 2 MG/1
TABLET ORAL
COMMUNITY
Start: 2022-06-22 | End: 2023-04-27

## 2022-08-09 RX ORDER — TRAZODONE HYDROCHLORIDE 50 MG/1
TABLET, FILM COATED ORAL
COMMUNITY
Start: 2022-07-06

## 2022-08-09 RX ORDER — BUPROPION HYDROCHLORIDE 150 MG/1
TABLET ORAL
COMMUNITY
Start: 2022-07-06

## 2022-08-09 RX ORDER — METOPROLOL SUCCINATE 100 MG/1
100 TABLET, EXTENDED RELEASE ORAL DAILY
Qty: 90 TABLET | Refills: 3 | Status: SHIPPED | OUTPATIENT
Start: 2022-08-09 | End: 2023-08-15

## 2022-08-09 RX ORDER — BUSPIRONE HYDROCHLORIDE 15 MG/1
TABLET ORAL
COMMUNITY
Start: 2022-08-02

## 2022-08-09 RX ORDER — ACETAMINOPHEN AND CODEINE PHOSPHATE 120; 12 MG/5ML; MG/5ML
0.35 SOLUTION ORAL DAILY
Qty: 84 TABLET | Refills: 3 | Status: SHIPPED | OUTPATIENT
Start: 2022-08-09 | End: 2023-06-30

## 2022-08-09 ASSESSMENT — ENCOUNTER SYMPTOMS
WEAKNESS: 0
MYALGIAS: 0
FREQUENCY: 0
BREAST MASS: 0
EYE PAIN: 0
COUGH: 0
DIZZINESS: 0
DYSURIA: 0
PALPITATIONS: 0
DIARRHEA: 0
ARTHRALGIAS: 0
CHILLS: 0
PARESTHESIAS: 0
NAUSEA: 0
ABDOMINAL PAIN: 0
NERVOUS/ANXIOUS: 1
FEVER: 0
HEADACHES: 0
SHORTNESS OF BREATH: 0
HEARTBURN: 0
HEMATOCHEZIA: 0
SORE THROAT: 0
HEMATURIA: 0
CONSTIPATION: 0
JOINT SWELLING: 0

## 2022-08-09 ASSESSMENT — PATIENT HEALTH QUESTIONNAIRE - PHQ9
SUM OF ALL RESPONSES TO PHQ QUESTIONS 1-9: 17
10. IF YOU CHECKED OFF ANY PROBLEMS, HOW DIFFICULT HAVE THESE PROBLEMS MADE IT FOR YOU TO DO YOUR WORK, TAKE CARE OF THINGS AT HOME, OR GET ALONG WITH OTHER PEOPLE: VERY DIFFICULT
SUM OF ALL RESPONSES TO PHQ QUESTIONS 1-9: 17

## 2022-08-09 NOTE — LETTER
My Depression Action Plan  Name: Diane Costa   Date of Birth 1985  Date: 8/9/2022    My doctor: Isabel Jefferson   My clinic: [facility]  [address], [address]  [place] [address]  [phone]          GREEN    ZONE   Good Control    What it looks like:     Things are going generally well. You have normal ups and downs. You may even feel depressed from time to time, but bad moods usually last less than a day.   What you need to do:  1. Continue to care for yourself (see self care plan)  2. Check your depression survival kit and update it as needed  3. Follow your physician s recommendations including any medication.  4. Do not stop taking medication unless you consult with your physician first.           YELLOW         ZONE Getting Worse    What it looks like:     Depression is starting to interfere with your life.     It may be hard to get out of bed; you may be starting to isolate yourself from others.    Symptoms of depression are starting to last most all day and this has happened for several days.     You may have suicidal thoughts but they are not constant.   What you need to do:     1. Call your care team. Your response to treatment will improve if you keep your care team informed of your progress. Yellow periods are signs an adjustment may need to be made.     2. Continue your self-care.  Just get dressed and ready for the day.  Don't give yourself time to talk yourself out of it.    3. Talk to someone in your support network.    4. Open up your Depression Self-Care Plan/Wellness Kit.           RED    ZONE Medical Alert - Get Help    What it looks like:     Depression is seriously interfering with your life.     You may experience these or other symptoms: You can t get out of bed most days, can t work or engage in other necessary activities, you have trouble taking care of basic hygiene, or basic  responsibilities, thoughts of suicide or death that will not go away, self-injurious behavior.     What you need to do:  1. Call your care team and request a same-day appointment. If they are not available (weekends or after hours) call your local crisis line, emergency room or 911.          Depression Self-Care Plan / Wellness Kit    Many people find that medication and therapy are helpful treatments for managing depression. In addition, making small changes to your everyday life can help to boost your mood and improve your wellbeing. Below are some tips for you to consider. Be sure to talk with your medical provider and/or behavioral health consultant if your symptoms are worsening or not improving.     Sleep   Sleep hygiene  means all of the habits that support good, restful sleep. It includes maintaining a consistent bedtime and wake time, using your bedroom only for sleeping or sex, and keeping the bedroom dark and free of distractions like a computer, smartphone, or television.     Develop a Healthy Routine  Maintain good hygiene. Get out of bed in the morning, make your bed, brush your teeth, take a shower, and get dressed. Don t spend too much time viewing media that makes you feel stressed. Find time to relax each day.    Exercise  Get some form of exercise every day. This will help reduce pain and release endorphins, the  feel good  chemicals in your brain. It can be as simple as just going for a walk or doing some gardening, anything that will get you moving.      Diet  Strive to eat healthy foods, including fruits and vegetables. Drink plenty of water. Avoid excessive sugar, caffeine, alcohol, and other mood-altering substances.     Stay Connected with Others  Stay in touch with friends and family members.    Manage Your Mood  Try deep breathing, massage therapy, biofeedback, or meditation. Take part in fun activities when you can. Try to find something to smile about each day.     Psychotherapy  Be open  to working with a therapist if your provider recommends it.     Medication  Be sure to take your medication as prescribed. Most anti-depressants need to be taken every day. It usually takes several weeks for medications to work. Not all medicines work for all people. It is important to follow-up with your provider to make sure you have a treatment plan that is working for you. Do not stop your medication abruptly without first discussing it with your provider.    Crisis Resources   These hotlines are for both adults and children. They and are open 24 hours a day, 7 days a week unless noted otherwise.      National Suicide Prevention Lifeline   988 or 0-826-861-SBLB (7934)      Crisis Text Line    www.crisistextline.org  Text HOME to 471022 from anywhere in the [place], anytime, about any type of crisis. A live, trained crisis counselor will receive the text and respond quickly.      [place] Lifeline for LGBTQ Youth  A national crisis intervention and suicide lifeline for LGBTQ youth under 25. Provides a safe place to talk without judgement. Call 1-280.198.2077; text START to 858016 or visit www.the[employer]AZINEvorproject.org to talk to a trained counselor.      For Pending sale to [facility] crisis numbers, visit the [facility] website at:  https://[place].gov/dhs/people-we-serve/adults/health-care/mental-health/resources/crisis-contacts.jsp

## 2022-08-09 NOTE — PROGRESS NOTES
SUBJECTIVE:   CC: Diane Costa is an 37 year old woman who presents for preventive health visit.     Healthy Habits:     Getting at least 3 servings of Calcium per day:  Yes    Bi-annual eye exam:  Yes    Dental care twice a year:  Yes    Sleep apnea or symptoms of sleep apnea:  Daytime drowsiness, Excessive snoring and Sleep apnea    Diet:  Regular (no restrictions)    Frequency of exercise:  2-3 days/week    Duration of exercise:  30-45 minutes    Taking medications regularly:  Yes    PHQ-2 Total Score: 6    Additional concerns today:  Yes    She is seeing a mental health provider at [facility] and this has been challenging to know if her medications are working as the doses ar moving up and down. Work is ok, working while the kids are in school.     Birth control is working fine. BP looks great. Exercise has been ok, they are walking a few times a week.     Her sweats are getting worse, can be sitting in air conditioning for hours and then will all of a sudden be dripping sweat. It's gotten worse in the last 1.5 years, has been on the gabapentin for the last few years.     Tingling in her fingers is worse in the last few year as well, does have carpal tunnel. Does have shooting up the arm if she puts pressure on it.     Today's PHQ-2 Score:   PHQ-2 ( 1999 Pfizer) 8/9/2022   Q1: Little interest or pleasure in doing things 3   Q2: Feeling down, depressed or hopeless 3   PHQ-2 Score 6   Q1: Little interest or pleasure in doing things Nearly every day   Q2: Feeling down, depressed or hopeless Nearly every day   PHQ-2 Score 6     Abuse: Current or Past (Physical, Sexual or Emotional) - No  Do you feel safe in your environment? Yes    Have you ever done Advance Care Planning? (For example, a Health Directive, POLST, or a discussion with a medical provider or your loved ones about your wishes): No, advance care planning information given to patient to review.  Patient declined advance care planning discussion at  this time.    Social History     Tobacco Use     Smoking status: Never Smoker     Smokeless tobacco: Never Used   Substance Use Topics     Alcohol use: Yes     Alcohol/week: 0.0 - 1.0 standard drinks       Alcohol Use 8/9/2022   Prescreen: >3 drinks/day or >7 drinks/week? Not Applicable     Reviewed orders with patient.  Reviewed health maintenance and updated orders accordingly - Yes      Breast Cancer Screening:    Breast CA Risk Assessment (FHS-7) 8/9/2022   Do you have a family history of breast, colon, or ovarian cancer? No / Unknown         Patient under 40 years of age: Routine Mammogram Screening not recommended.   Pertinent mammograms are reviewed under the imaging tab.    History of abnormal Pap smear: NO - age 30-65 PAP every 5 years with negative HPV co-testing recommended  PAP / HPV Latest Ref Rng & Units 4/12/2019 10/11/2017 7/11/2014   PAP Negative for squamous intraepithelial lesion or malignancy. Negative for squamous intraepithelial lesion or malignancy  Electronically signed by Blanquita Suazo CT (ASCP) on 4/19/2019 at  9:44 AM   Atypical squamous cells of undetermined significance  Electronically signed by Roxanne Hannah MD on 10/18/2017 at  4:44 PM   Negative for squamous intraepithelial lesion or malignancy  Electronically signed by Blanquita Miles CT (ASCP) on 7/22/2014 at  3:48 PM     HPV16 NEG Negative - -   HPV18 NEG Negative - -   HRHPV NEG Negative - -     Reviewed and updated as needed this visit by clinical staff   Tobacco  Allergies  Meds  Problems  Med Hx  Surg Hx  Fam Hx  Soc   Hx          Reviewed and updated as needed this visit by Provider   Tobacco  Allergies  Meds  Problems  Med Hx  Surg Hx  Fam Hx               Review of Systems   Constitutional: Negative for chills and fever.   HENT: Negative for congestion, ear pain, hearing loss and sore throat.    Eyes: Negative for pain and visual disturbance.   Respiratory: Negative for cough and shortness of  "breath.    Cardiovascular: Negative for chest pain, palpitations and peripheral edema.   Gastrointestinal: Negative for abdominal pain, constipation, diarrhea, heartburn, hematochezia and nausea.   Breasts:  Negative for tenderness, breast mass and discharge.   Genitourinary: Negative for dysuria, frequency, genital sores, hematuria, pelvic pain, urgency, vaginal bleeding and vaginal discharge.   Musculoskeletal: Negative for arthralgias, joint swelling and myalgias.   Skin: Negative for rash.   Neurological: Negative for dizziness, weakness, headaches and paresthesias.   Psychiatric/Behavioral: Positive for mood changes. The patient is nervous/anxious.         OBJECTIVE:   /78   Pulse 87   Temp 98.4  F (36.9  C)   Ht 1.727 m (5' 8\")   Wt 103.9 kg (229 lb)   LMP 07/19/2022   SpO2 97%   BMI 34.82 kg/m    Physical Exam  GENERAL: healthy, alert and no distress  EYES: Eyes grossly normal to inspection, PERRL and conjunctivae and sclerae normal  HENT: ear canals and TM's normal, nose and mouth without ulcers or lesions  NECK: no adenopathy, no asymmetry, masses, or scars and thyroid normal to palpation  RESP: lungs clear to auscultation - no rales, rhonchi or wheezes  BREAST: normal without masses, tenderness or nipple discharge and no palpable axillary masses or adenopathy  CV: regular rate and rhythm, normal S1 S2, no S3 or S4, no murmur, click or rub, no peripheral edema and peripheral pulses strong  ABDOMEN: soft, nontender, no hepatosplenomegaly, no masses and bowel sounds normal   (female): deferred  MS: no gross musculoskeletal defects noted, no edema  SKIN: no suspicious lesions or rashes  NEURO: Normal strength and tone, mentation intact and speech normal, negative phalen's and tinel's  PSYCH: mentation appears normal, affect normal/bright        ASSESSMENT/PLAN:   Diane was seen today for physical.    Diagnoses and all orders for this visit:    Encounter for routine history and physical " examination of adult   Routine health maintenance discussion:  No smoking, limited alcohol (7 or less servings per week), 5 fruits/veg servings per day, 200 minutes of exercise per week.  Daily calcium/vitamin D guidelines, bone health, colon cancer screening beginning at age 50.  Accident avoidance, sun screen.    Encounter for surveillance of contraceptive pills  -     norethindrone (INCASSIA) 0.35 MG tablet; Take 1 tablet (0.35 mg) by mouth daily   Doing well on current medication, renewal today    Hot flashes  -     Luteinizing Hormone, Adult; Future  -     Follicle stimulating hormone; Future  -     Luteinizing Hormone, Adult  -     Follicle stimulating hormone   Will update again today to see if obvious signs of perimenopause. If normal should consider increase in gabapentin to see if this will help.     Essential hypertension  -     Comprehensive metabolic panel (BMP + Alb, Alk Phos, ALT, AST, Total. Bili, TP); Future  -     Comprehensive metabolic panel (BMP + Alb, Alk Phos, ALT, AST, Total. Bili, TP)   BP at goal today, will update labs and continue on current medications.     Tingling in extremities  -     TSH with free T4 reflex; Future  -     Vitamin B12; Future  -     TSH with free T4 reflex  -     Vitamin B12   Checking labs, if normal should consider referral to orthopedics for further evaluation    Need for hepatitis C screening test  -     Hepatitis C Screen Reflex to HCV RNA Quant and Genotype; Future  -     Hepatitis C Screen Reflex to HCV RNA Quant and Genotype    Lipid screening  -     Lipid panel reflex to direct LDL Non-fasting; Future  -     Lipid panel reflex to direct LDL Non-fasting    Essential (primary) hypertension  -     metoprolol succinate ER (TOPROL XL) 100 MG 24 hr tablet; Take 1 tablet (100 mg) by mouth daily    Other orders  -     REVIEW OF HEALTH MAINTENANCE PROTOCOL ORDERS        Patient has been advised of split billing requirements and indicates understanding:  "Yes    COUNSELING:  Reviewed preventive health counseling, as reflected in patient instructions       Regular exercise       Healthy diet/nutrition       Contraception       Osteoporosis prevention/bone health       Consider Hep C screening for all patients one time for ages 18-79 years       Advance Care Planning    Estimated body mass index is 34.82 kg/m  as calculated from the following:    Height as of this encounter: 1.727 m (5' 8\").    Weight as of this encounter: 103.9 kg (229 lb).    Weight management plan: Discussed healthy diet and exercise guidelines    She reports that she has never smoked. She has never used smokeless tobacco.      Counseling Resources:  ATP IV Guidelines  Pooled Cohorts Equation Calculator  Breast Cancer Risk Calculator  BRCA-Related Cancer Risk Assessment: FHS-7 Tool  FRAX Risk Assessment  ICSI Preventive Guidelines  Dietary Guidelines for Americans, 2010  USDA's MyPlate  ASA Prophylaxis  Lung CA Screening    Isabel Jefferson MD  [facility]  Answers for HPI/ROS submitted by the patient on 8/9/2022  If you checked off any problems, how difficult have these problems made it for you to do your work, take care of things at home, or get along with other people?: Very difficult  PHQ9 TOTAL SCORE: 17      "

## 2022-08-10 LAB — HCV AB SERPL QL IA: NONREACTIVE

## 2022-11-03 ENCOUNTER — MYC MEDICAL ADVICE (OUTPATIENT)
Dept: FAMILY MEDICINE | Facility: CLINIC | Age: 37
End: 2022-11-03

## 2022-11-04 NOTE — CONFIDENTIAL NOTE
Additional questions sent via [employer].  Blanquita Celestin RN on 11/4/2022 at 1:03 PM

## 2022-11-15 ENCOUNTER — MYC MEDICAL ADVICE (OUTPATIENT)
Dept: FAMILY MEDICINE | Facility: CLINIC | Age: 37
End: 2022-11-15

## 2022-11-21 ENCOUNTER — HEALTH MAINTENANCE LETTER (OUTPATIENT)
Age: 37
End: 2022-11-21

## 2023-01-05 ENCOUNTER — OFFICE VISIT (OUTPATIENT)
Dept: FAMILY MEDICINE | Facility: CLINIC | Age: 38
End: 2023-01-05
Payer: COMMERCIAL

## 2023-01-05 ENCOUNTER — E-VISIT (OUTPATIENT)
Dept: FAMILY MEDICINE | Facility: CLINIC | Age: 38
End: 2023-01-05
Payer: COMMERCIAL

## 2023-01-05 VITALS
OXYGEN SATURATION: 97 % | WEIGHT: 223 LBS | TEMPERATURE: 98 F | SYSTOLIC BLOOD PRESSURE: 114 MMHG | HEART RATE: 77 BPM | DIASTOLIC BLOOD PRESSURE: 75 MMHG | RESPIRATION RATE: 18 BRPM | BODY MASS INDEX: 33.91 KG/M2

## 2023-01-05 DIAGNOSIS — J02.0 STREP THROAT: Primary | ICD-10-CM

## 2023-01-05 DIAGNOSIS — Z20.818 STREPTOCOCCUS EXPOSURE: Primary | ICD-10-CM

## 2023-01-05 DIAGNOSIS — R07.0 THROAT PAIN: ICD-10-CM

## 2023-01-05 LAB — DEPRECATED S PYO AG THROAT QL EIA: POSITIVE

## 2023-01-05 PROCEDURE — 87880 STREP A ASSAY W/OPTIC: CPT | Performed by: PHYSICIAN ASSISTANT

## 2023-01-05 PROCEDURE — 99421 OL DIG E/M SVC 5-10 MIN: CPT | Performed by: FAMILY MEDICINE

## 2023-01-05 PROCEDURE — 99213 OFFICE O/P EST LOW 20 MIN: CPT | Performed by: PHYSICIAN ASSISTANT

## 2023-01-05 RX ORDER — AMOXICILLIN 500 MG/1
500 CAPSULE ORAL 2 TIMES DAILY
Qty: 20 CAPSULE | Refills: 0 | Status: SHIPPED | OUTPATIENT
Start: 2023-01-05 | End: 2023-04-27

## 2023-01-05 RX ORDER — PENICILLIN V POTASSIUM 500 MG/1
500 TABLET, FILM COATED ORAL 2 TIMES DAILY
Qty: 20 TABLET | Refills: 0 | Status: SHIPPED | OUTPATIENT
Start: 2023-01-05 | End: 2023-01-15

## 2023-01-05 NOTE — PATIENT INSTRUCTIONS
Suggested increased rest increased fluids and bedside humidification  Over-the-counter Tylenol for comfort.  Follow packaging directions  Over-the-counter throat lozenges with benzocaine such as Cepacol may be used if indicated and is not a choking hazard based on age.  Follow packaging directions.  Do not overuse the benzocaine as it will dry the throat and make it uncomfortable.  Noncontagious after 24 hours on the antibiotic.  Change toothbrush out after 48 hours to avoid reinfecting the mouth.  Follow-up after completion of the antibiotic if any consultation or sequela.          Self-Care for Sore Throats  Sore throats happen for many reasons, such as colds, allergies, and infections caused by viruses or bacteria. In any case, your throat becomes red and sore. Your goal for self-care is to reduce your discomfort while giving your throat a chance to heal.    Moisten and soothe your throat  Tips include the following:  Try a sip of water first thing after waking up.  Keep your throat moist by drinking 6 or more glasses of clear liquids every day.  Run a cool-air humidifier in your room overnight.  Avoid cigarette smoke.   Suck on throat lozenges, cough drops, hard candy, ice chips, or frozen fruit-juice bars. Use the sugar-free versions if your diet or medical condition requires them.  Gargle to ease irritation  Gargling every hour or 2 can ease irritation. Try gargling with 1 of these solutions:  1/4 teaspoon of salt in 1/2 cup of warm water  An over-the-counter anesthetic gargle  Use medicine for more relief  Over-the-counter medicine can reduce sore throat symptoms. Ask your pharmacist if you have questions about which medicine to use:  Ease pain with anesthetic sprays. Aspirin or an aspirin substitute also helps. Remember, never give aspirin to anyone 18 or younger, or if you are already taking blood thinners.   For sore throats caused by allergies, try antihistamines to block the allergic reaction.  Remember:  unless a sore throat is caused by a bacterial infection, antibiotics won t help you.  Prevent future sore throats  Prevention tips include the following:  Stop smoking or reduce contact with secondhand smoke. Smoke irritates the tender throat lining.  Limit contact with pets and with allergy-causing substances, such as pollen and mold.  When you re around someone with a sore throat or cold, wash your hands often to keep viruses or bacteria from spreading.  Don t strain your vocal cords.  Call your healthcare provider  Contact your healthcare provider if you have:  A temperature over 101 F (38.3 C)  White spots on the throat  Great difficulty swallowing  Trouble breathing  A skin rash  Recent exposure to someone else with strep bacteria  Severe hoarseness and swollen glands in the neck or jaw   Date Last Reviewed: 8/1/2016 2000-2016 [employer]. [address] [place] [address], [place] [address]. All rights reserved. This information is not intended as a substitute for professional medical care. Always follow your healthcare professional's instructions.

## 2023-01-05 NOTE — TELEPHONE ENCOUNTER
Provider E-Visit time total (minutes): 4      Assessment:   The primary encounter diagnosis was (Z20.818) Streptococcus exposure  (primary encounter diagnosis)  Comment:   Plan: amoxicillin (AMOXIL) 500 MG capsule,         Streptococcus A Rapid Screen w/Reflex to PCR -         Clinic Collect              Plan:   No orders of the defined types were placed in this encounter.      Patient Instructions     Thank you for choosing us for your care.    I think that with two kids and your  having strep and your symptoms it is very reasonable to treat you as positive for strep. I do have an order in for strep testing if you'd prefer to do this prior to taking the antibiotics as well.      I have placed an order for a prescription so that you can start treatment. View your full visit summary for details by clicking on the link below. Your pharmacist will able to address any questions you may have about the medication.     If you're not feeling better within 5-7 days, please schedule an appointment.  You can schedule an appointment right here in [facility], or call [phone]  If the visit is for the same symptoms as your eVisit, we'll refund the cost of your eVisit if seen within seven days.        Subjective:   Diane Costa is a 37 year old female who submitted an eVisit request for evaluation of   Chief Complaint   Patient presents with     Other     Entered automatically based on patient selection in [employer].       See the questionnaire and message section of encounter report for information related to history of present illness and review of systems.    Portions of the patient's history were reviewed and updated as appropriate.     Objective:   No exam performed today, patient submitted as eVisit.

## 2023-01-05 NOTE — PROGRESS NOTES
Patient presents with:  Sick: Has sore throat for 2 days and left ear pain 3       Clinical Decision Making:  Strep test was obtained and was positive.  Pen-Vee K as written for the strep throat. Symptomatic care was gone over. Expected course of resolution and indication for return was gone over and questions were answered to patient/parent's satisfaction before discharge.        ICD-10-CM    1. Strep throat  J02.0 penicillin V (VEETID) 500 MG tablet      2. Throat pain  R07.0 Streptococcus A Rapid Screen w/Reflex to PCR - Clinic Collect          Patient Instructions     Suggested increased rest increased fluids and bedside humidification  Over-the-counter Tylenol for comfort.  Follow packaging directions  Over-the-counter throat lozenges with benzocaine such as Cepacol may be used if indicated and is not a choking hazard based on age.  Follow packaging directions.  Do not overuse the benzocaine as it will dry the throat and make it uncomfortable.  Noncontagious after 24 hours on the antibiotic.  Change toothbrush out after 48 hours to avoid reinfecting the mouth.  Follow-up after completion of the antibiotic if any consultation or sequela.          Self-Care for Sore Throats  Sore throats happen for many reasons, such as colds, allergies, and infections caused by viruses or bacteria. In any case, your throat becomes red and sore. Your goal for self-care is to reduce your discomfort while giving your throat a chance to heal.    Moisten and soothe your throat  Tips include the following:    Try a sip of water first thing after waking up.    Keep your throat moist by drinking 6 or more glasses of clear liquids every day.    Run a cool-air humidifier in your room overnight.    Avoid cigarette smoke.     Suck on throat lozenges, cough drops, hard candy, ice chips, or frozen fruit-juice bars. Use the sugar-free versions if your diet or medical condition requires them.  Gargle to ease irritation  Gargling every hour or 2  can ease irritation. Try gargling with 1 of these solutions:    1/4 teaspoon of salt in 1/2 cup of warm water    An over-the-counter anesthetic gargle  Use medicine for more relief  Over-the-counter medicine can reduce sore throat symptoms. Ask your pharmacist if you have questions about which medicine to use:    Ease pain with anesthetic sprays. Aspirin or an aspirin substitute also helps. Remember, never give aspirin to anyone 18 or younger, or if you are already taking blood thinners.     For sore throats caused by allergies, try antihistamines to block the allergic reaction.    Remember: unless a sore throat is caused by a bacterial infection, antibiotics won t help you.  Prevent future sore throats  Prevention tips include the following:    Stop smoking or reduce contact with secondhand smoke. Smoke irritates the tender throat lining.    Limit contact with pets and with allergy-causing substances, such as pollen and mold.    When you re around someone with a sore throat or cold, wash your hands often to keep viruses or bacteria from spreading.    Don t strain your vocal cords.  Call your healthcare provider  Contact your healthcare provider if you have:    A temperature over 101 F (38.3 C)    White spots on the throat    Great difficulty swallowing    Trouble breathing    A skin rash    Recent exposure to someone else with strep bacteria    Severe hoarseness and swollen glands in the neck or jaw   Date Last Reviewed: 8/1/2016 2000-2016 [employer].. [address] [place] [address]. All rights reserved. This information is not intended as a substitute for professional medical care. Always follow your healthcare professional's instructions.        HPI:  Diane Costa is a 37 year old female who presents today one day acute onset of sore throat and odynophagia.  Patient denies fever, chills, night sweats, fatigue, vomiting, diarrhea, skin rash, abdominal pain or urinary symptoms.       Known sick contacts for strep throat with  and 2 children who have been positive for strep.    Has not tried treatment for this over-the-counter.    History obtained from chart review and the patient.    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     Fatty liver     hx of transaminase elevations .     GERD (gastroesophageal reflux disease)     controlled w/ Pepcid     History of blood transfusion reaction     has antibodies.     History of transfusion      IBS (irritable bowel syndrome)     Non-specified      Left knee pain     hx of patellar ligament repair (cadaver)     Mental disorder     history of anxiety     Mild major depression (H) 2021     AZALEA (obstructive sleep apnea)     \     Pregnancy-induced hypertension in third trimester 2018     Vertebral artery  dissection (H) 02/2020    left, on lifelong baby ASA 81mg now.       Social History     Tobacco Use     Smoking status: Never     Smokeless tobacco: Never   Substance Use Topics     Alcohol use: Yes     Alcohol/week: 0.0 - 1.0 standard drinks       Review of Systems  As above in HPI otherwise negative.    Vitals:    01/05/23 1655   BP: 114/75   Pulse: 77   Resp: 18   Temp: 98  F (36.7  C)   TempSrc: Oral   SpO2: 97%   Weight: 101.2 kg (223 lb)       General: Patient is resting comfortably no acute distress is afebrile  HEENT: Head is normocephalic atraumatic   eyes are PERRL EOMI sclera anicteric   TMs are clear bilaterally  Throat is with mild pharyngeal wall erythema and no exudate  No cervical lymphadenopathy present  LUNGS: Clear to auscultation bilaterally  HEART: Regular rate and rhythm  Skin: Without rash non-diaphoretic    Physical Exam      Labs:  Results for orders placed or performed in visit on 01/05/23   Streptococcus A Rapid Screen w/Reflex to PCR - Clinic Collect     Status: Abnormal    Specimen: Throat; Swab   Result Value Ref Range    Group A Strep antigen Positive (A) Negative     At the end of the encounter, I discussed results, diagnosis, medications. Discussed red flags for immediate return to clinic/ER, as well as indications for follow up if no improvement. Patient understood and agreed to plan. Patient was stable for discharge.

## 2023-01-05 NOTE — PATIENT INSTRUCTIONS
Thank you for choosing us for your care.    I think that with two kids and your  having strep and your symptoms it is very reasonable to treat you as positive for strep. I do have an order in for strep testing if you'd prefer to do this prior to taking the antibiotics as well.      I have placed an order for a prescription so that you can start treatment. View your full visit summary for details by clicking on the link below. Your pharmacist will able to address any questions you may have about the medication.     If you're not feeling better within 5-7 days, please schedule an appointment.  You can schedule an appointment right here in [facility], or call [phone]  If the visit is for the same symptoms as your eVisit, we'll refund the cost of your eVisit if seen within seven days.

## 2023-02-02 ENCOUNTER — TRANSFERRED RECORDS (OUTPATIENT)
Dept: HEALTH INFORMATION MANAGEMENT | Facility: CLINIC | Age: 38
End: 2023-02-02

## 2023-02-15 ENCOUNTER — E-VISIT (OUTPATIENT)
Dept: FAMILY MEDICINE | Facility: CLINIC | Age: 38
End: 2023-02-15
Payer: COMMERCIAL

## 2023-02-15 DIAGNOSIS — R51.9 INTRACTABLE HEADACHE, UNSPECIFIED CHRONICITY PATTERN, UNSPECIFIED HEADACHE TYPE: Primary | ICD-10-CM

## 2023-02-15 PROCEDURE — 99207 PR NON-BILLABLE SERV PER CHARTING: CPT | Performed by: FAMILY MEDICINE

## 2023-02-15 NOTE — TELEPHONE ENCOUNTER
Provider E-Visit time total (minutes): 3      Assessment:   The primary encounter diagnosis was (R51.9) Intractable headache, unspecified chronicity pattern, unspecified headache type  (primary encounter diagnosis)  Comment:   Plan:       Plan:   No orders of the defined types were placed in this encounter.      Patient Instructions     Thank you for choosing us for your care. I think an in-clinic visit would be best next steps based on your symptoms. Please schedule a clinic appointment; you won t be charged for this eVisit.  If something changes acutely, please go into the ER or urgent care.     I'm out of the office the rest of the week but let us know if you are having trouble getting in to see someone.     You can schedule an appointment right here in [employer], or call [phone]        Subjective:   Diane Costa is a 38 year old female who submitted an eVisit request for evaluation of   Chief Complaint   Patient presents with     Headache     Entered automatically based on patient selection in [employer].     Headache       See the questionnaire and message section of encounter report for information related to history of present illness and review of systems.    Portions of the patient's history were reviewed and updated as appropriate.     Objective:   No exam performed today, patient submitted as eVisit.

## 2023-02-16 ENCOUNTER — OFFICE VISIT (OUTPATIENT)
Dept: FAMILY MEDICINE | Facility: CLINIC | Age: 38
End: 2023-02-16
Payer: COMMERCIAL

## 2023-02-16 VITALS
TEMPERATURE: 98.4 F | OXYGEN SATURATION: 98 % | RESPIRATION RATE: 18 BRPM | HEART RATE: 78 BPM | SYSTOLIC BLOOD PRESSURE: 118 MMHG | DIASTOLIC BLOOD PRESSURE: 82 MMHG

## 2023-02-16 DIAGNOSIS — R42 VERTIGO: ICD-10-CM

## 2023-02-16 DIAGNOSIS — H54.7 ALTERATION IN VISION: ICD-10-CM

## 2023-02-16 DIAGNOSIS — I77.74 DISSECTION OF VERTEBRAL ARTERY (H): Primary | ICD-10-CM

## 2023-02-16 PROCEDURE — 99215 OFFICE O/P EST HI 40 MIN: CPT | Performed by: FAMILY MEDICINE

## 2023-02-16 NOTE — PROGRESS NOTES
ASSESSMENT/PLAN:      ICD-10-CM    1. Dissection of vertebral artery (H)  I77.74     4/2020, uncertain etiology       2. Alteration in vision  H54.7       3. Vertigo  R42         Called and spoke to [facility] to charge RN-patient will go to ER via POV driven by family member       Patient Instructions   To [facility] ER due to your history of vertebral artery dissection 4/2020 and evaluated at the [place] ER at that time.    You did well until the last 2 days with increasing blurring of vision ivonne vertigo 2 episodes an hour near falls, in past after dissection of vertebral artery you would only have these episodes at most once every 2 months and would only last for 30 seconds, in addition, worsening headache in the base of your occiput and pain into your neck-            Reviewed medication instructions and side effects. Follow up if experiences side effects.     I reviewed supportive care, otc meds to use if needed, expected course, and signs of concern.  Follow up as needed or if she does not improve within  1-2 days or if worsens in any way.  Reviewed red flag symptoms and is to go to the ER if experiences any of these.     The use of Dragon/[employer] dictation services may have been used to construct the content in this note; any grammatical or spelling errors are non-intentional. Please contact the author of this note directly if you are in need of any clarification.      On the day of the encounter, time spend on chart review, patient visit, review of testing, documentation was 40  minutes          Patient Instructions   To [facility] ER due to your history of vertebral artery dissection 4/2020 and evaluated at the [place] ER at that time.    You did well until the last 2 days with increasing blurring of vision ivonne vertigo 2 episodes an hour near falls, in past after dissection of vertebral artery you would only have these episodes at most once every 2 months and would only last for 30  seconds, in addition, worsening headache in the base of your occiput and pain into your neck-              Patient presents with:  Dizziness: Headache in back of head and dizziness        Subjective     Diane Costa is a 38 year old female who presents to clinic today for the following health issues:    HPI       Dizziness      Duration: 5/5/2020-vertebral artery dissection    Since dissection,boom sensation everything gets blurry, sometimes room spins and last less than 30 sec about every 1-2 months since, getting more frequent in the last month 2 times a week, 30 sec, in last 2 days, 1 an hour yesterday throughout the day, last night, 2 episodes in 1 hours, duration is still 30 seconds  minimal of 2 twice and hour started last night, and continue sot be 2 an hour       Starting last last night, aching in neck down to shoulder, has not gotten worse, has not resolved, in addition, intermittent ear pain started with onset of the neck pain, no hx of in the past, no hx of chronic ear infection, PE tubes as a child or adult   Onset of nausea around 12 noon, no emesis no diarrhea   Takes a baby asa daily   After dissection had these types of neck pain and goes into back of head and ibuprofen did not help     No syncope, no near syncope   Episodes of feeling off balance and almost fell in last 2 days, no history of in the past    NO fever, no cough, mild congestion, postnasal drainage, no myalgias, unable to sleep last night, if moved head, everything started to spin    No falls no hx of hitting head, no hx of concussion in her lifetime         Past Medical History:   Diagnosis Date     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     Fatty liver     hx of transaminase elevations 2020.     GERD (gastroesophageal reflux disease)     controlled w/  Pepcid     History of blood transfusion reaction     has antibodies.     History of transfusion      IBS (irritable bowel syndrome)     Non-specified      Left knee pain     hx of patellar ligament repair (cadaver)     Mental disorder     history of anxiety     Mild major depression (H) 06/04/2021     AZALEA (obstructive sleep apnea)     \     Pregnancy-induced hypertension in third trimester 03/27/2018     Vertebral artery dissection (H) 02/2020    left, on lifelong baby ASA 81mg now.     Social History     Tobacco Use     Smoking status: Never     Smokeless tobacco: Never   Vaping Use     Vaping status: Not on file   Substance Use Topics     Alcohol use: Yes     Alcohol/week: 0.0 - 1.0 standard drinks of alcohol       Current Outpatient Medications   Medication Sig Dispense Refill     aspirin (ASA) 81 MG chewable tablet Take 81 mg by mouth       buPROPion (WELLBUTRIN XL) 150 MG 24 hr tablet TAKE 3 TABLETS BY MOUTH EVERY DAY       busPIRone (BUSPAR) 15 MG tablet        cholecalciferol 50 MCG (2000 UT) CAPS Take 1 tablet by mouth daily       gabapentin (NEURONTIN) 300 MG capsule        metoprolol succinate ER (TOPROL XL) 100 MG 24 hr tablet Take 1 tablet (100 mg) by mouth daily 90 tablet 3     norethindrone (INCASSIA) 0.35 MG tablet Take 1 tablet (0.35 mg) by mouth daily 84 tablet 3     venlafaxine (EFFEXOR-XR) 75 MG 24 hr capsule Take 225 mg by mouth       amoxicillin (AMOXIL) 500 MG capsule Take 1 capsule (500 mg) by mouth 2 times daily (Patient not taking: Reported on 1/5/2023) 20 capsule 0     ARIPiprazole (ABILIFY) 2 MG tablet TAKE 1 TABLET BY MOUTH EVERY OTHER DAY (Patient not taking: Reported on 1/5/2023)       traZODone (DESYREL) 50 MG tablet TAKE 1/2-1 TABLET BY MOUTH EVERY NIGHT AT BEDTIME AS NEEDED FOR SLEEP (Patient not taking: Reported on 1/5/2023)       Allergies   Allergen Reactions     Oxycodone Nausea     Weakness, seen in ER for this     Percocet [Oxycodone-Acetaminophen] Nausea and Vomiting      Blood-Group Specific Substance Unknown     History of anti-E and anti-Cw.  Expect delays in obtaining blood for transfusion.              ROS are negative, except as otherwise noted HPI      Objective    /82   Pulse 78   Temp 98.4  F (36.9  C) (Oral)   Resp 18   LMP 02/02/2023 (Approximate)   SpO2 98%   There is no height or weight on file to calculate BMI.  Physical Exam   GENERAL:  alert and mild distress  EYES: Eyes grossly normal to inspection, pupils reative    + photophobia,  conjunctivae and sclerae normal  NECK: no adenopathy, no asymmetry,   RESP: lungs clear to auscultation - no rales, rhonchi or wheezes  CV: regular rate and rhythm, normal S1 S2, no S3 or S4, no murmur, click or rub,   MS: no gross musculoskeletal defects noted, no edema  Neck-mild paraspinous muscle tenderness and base of occiput, does not reproduce pain, supple, FROM,   NEURO: Normal strength and tone, mentation intact and speech normal, gait ataxic, abnormal tandem gait, able to get up on heels/toes, unable to do heel/toe gait, Romberg-normal     Diagnostic Test Results:  Labs reviewed in Epic  No results found for any visits on 02/16/23.

## 2023-02-16 NOTE — PATIENT INSTRUCTIONS
To [facility] ER due to your history of vertebral artery dissection 4/2020 and evaluated at the [place] ER at that time.    You did well until the last 2 days with increasing blurring of vision ivonne vertigo 2 episodes an hour near falls, in past after dissection of vertebral artery you would only have these episodes at most once every 2 months and would only last for 30 seconds, in addition, worsening headache in the base of your occiput and pain into your neck-

## 2023-03-07 ENCOUNTER — TRANSFERRED RECORDS (OUTPATIENT)
Dept: HEALTH INFORMATION MANAGEMENT | Facility: CLINIC | Age: 38
End: 2023-03-07

## 2023-03-16 NOTE — TELEPHONE ENCOUNTER
REFERRAL INFORMATION:    Referring Provider:      Referring Clinic:      Reason for Visit/Diagnosis: NEW MWM, bmi 34, pt will confirm mwm/rd cvg and complete history, self referral, insur is [facility], pt will complete in [employer] (jamila not working) -djr       FUTURE VISIT INFORMATION:    Appointment Date: 4/27/23    Appointment Time: 1:30PM     NOTES RECORD STATUS  DETAILS   OFFICE NOTE from [facility] Internal 5/3/21 OV Terri Freed RD  4/28/21 OV Tahir Yeh MD   OPERATIVE REPORT Internal 12/20/13 colonoscopy    IMAGING (CT, MRI, US, XR)  Internal CT CHEST ABD: 3/1/2020

## 2023-03-20 ENCOUNTER — HOSPITAL ENCOUNTER (EMERGENCY)
Facility: CLINIC | Age: 38
Discharge: HOME | End: 2023-03-20
Admitting: PHYSICIAN ASSISTANT
Payer: COMMERCIAL

## 2023-03-20 VITALS
HEIGHT: 68 IN | RESPIRATION RATE: 16 BRPM | TEMPERATURE: 98.3 F | DIASTOLIC BLOOD PRESSURE: 67 MMHG | OXYGEN SATURATION: 96 % | SYSTOLIC BLOOD PRESSURE: 126 MMHG | HEART RATE: 83 BPM | BODY MASS INDEX: 33.8 KG/M2 | WEIGHT: 223 LBS

## 2023-03-20 DIAGNOSIS — L08.9 TOE INFECTION: ICD-10-CM

## 2023-03-20 PROCEDURE — 99283 EMERGENCY DEPT VISIT LOW MDM: CPT

## 2023-03-20 RX ORDER — CEPHALEXIN 500 MG/1
500 CAPSULE ORAL 4 TIMES DAILY
Qty: 40 CAPSULE | Refills: 0 | Status: SHIPPED | OUTPATIENT
Start: 2023-03-20 | End: 2023-03-30

## 2023-03-20 NOTE — DISCHARGE INSTRUCTIONS
Based on history present illness and physical exam I do suspect soft tissue infection surrounding your toe.  Please start the antibiotics as directed.  You may perform warm water soapy or Epsom salt soaks a couple times a day.  Please use Tylenol ibuprofen as needed for pain.  If after 48 to 72 hours you have persistent or worsening symptoms consider returning to the ED.

## 2023-03-20 NOTE — ED TRIAGE NOTES
Right 5th toe painful, red, and swollen. Symptoms started yesterday. No known injury      Triage Assessment     Row Name 03/20/23 1128       Triage Assessment (Adult)    Airway WDL WDL       Respiratory WDL    Respiratory WDL WDL       Skin Circulation/Temperature WDL    Skin Circulation/Temperature WDL WDL       Cardiac WDL    Cardiac WDL WDL       Peripheral/Neurovascular WDL    Peripheral Neurovascular WDL WDL       Cognitive/Neuro/Behavioral WDL    Cognitive/Neuro/Behavioral WDL WDL

## 2023-03-20 NOTE — ED PROVIDER NOTES
EMERGENCY DEPARTMENT ENCOUNTER      NAME: Diane Costa  AGE: 38 year old female  YOB: 1985  MRN: [id number]  EVALUATION DATE & TIME: No admission date for patient encounter.    PCP: Isabel Jefferson    ED PROVIDER: Noman Munguia PA-C      Chief Complaint   Patient presents with     Toe Pain         FINAL IMPRESSION:  1. Toe infection          MEDICAL DECISION MAKING:    Pertinent Labs & Imaging studies reviewed. (See chart for details)  38 year old female presents to the Emergency Department for evaluation of 24-hour history of right fifth toe pain.    After obtaining history of present illness, reviewing vitals and exam the patient findings would be consistent with a mild cellulitis of the right fifth toe.  There is no obvious paronychia at this time.  No skin breakdown, no necrosis, no gross evidence of trauma.  We will initiate antibiotic therapy and have her follow-up in clinic in a couple of days for repeat assessment as needed.  As always if there are worsening symptoms she can return to the ED for repeat assessment.  Patient has no long-term medical problems, there are no red flag signs or symptoms of indicate the emergent need for blood work or advanced imaging such as MRI.    Medical Decision Making    History:    Supplemental history from: Documented in chart, if applicable    External Record(s) reviewed: Documented in chart, if applicable.    Work Up:    Chart documentation includes differential considered and any EKGs or imaging independently interpreted by provider, where specified.    In additional to work up documented, I considered the following work up: Documented in chart, if applicable.    External consultation:    Discussion of management with another provider: Documented in chart, if applicable    Complicating factors:    Care impacted by chronic illness: N/A    Care affected by social determinants of health: N/A    Disposition considerations: Discharge. I prescribed  additional prescription strength medication(s) as charted. N/A.            ED COURSE    I met with the patient, obtained history, performed an initial exam, and discussed options and plan for diagnostics and treatment here in the ED.    At the conclusion of the encounter I discussed the results of all of the tests and the disposition. The questions were answered. The patient or family acknowledged understanding and was agreeable with the care plan.     MEDICATIONS GIVEN IN THE EMERGENCY:  Medications - No data to display    NEW PRESCRIPTIONS STARTED AT TODAY'S ER VISIT  New Prescriptions    CEPHALEXIN (KEFLEX) 500 MG CAPSULE    Take 1 capsule (500 mg) by mouth 4 times daily for 10 days            =================================================================    HPI    Patient information was obtained from: Patient  Diane Costa is a 38 year old female who presents to this ED for evaluation of 24-hour history of right fifth toe pain.  Patient states that last night before bed she noticed that the sheets from her bed laying on her right fifth toe were causing her some pain.  She noticed some redness and swelling this morning and presented for assessment.  She denies any recent trauma.  No reports of skin breakdown.      REVIEW OF SYSTEMS   Review of Systems   All other systems reviewed and are negative.         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     Fatty liver     hx of transaminase elevations 2020.     GERD (gastroesophageal reflux disease)     controlled w/ Pepcid     History of blood transfusion reaction     has antibodies.     History of transfusion      IBS (irritable bowel syndrome)     Non-specified      Left knee pain     hx of patellar ligament repair  (cadaver)     Mental disorder     history of anxiety     Mild major depression (H) 06/04/2021     AZALEA (obstructive sleep apnea)     \     Pregnancy-induced hypertension in third trimester 03/27/2018     Vertebral artery dissection (H) 02/2020    left, on lifelong baby ASA 81mg now.       PAST SURGICAL HISTORY:  Past Surgical History:   Procedure Laterality Date     CHOLECYSTECTOMY       COLONOSCOPY       HC KNEE SCOPE, DIAGNOSTIC      Description: Arthroscopy Knee Left;  Recorded: 12/14/2012;  Comments: x3; ; 1. 2005 Initial ligament repair; ; 2. 2007 LIgament tightening; ; 3. 2010 LIgament thightening, floating cart removal and excess bone removal.     UPPER GASTROINTESTINAL ENDOSCOPY       [facility] LAP,CHOLECYSTECTOMY/EXPLORE      Description: Cholecystectomy Laparoscopic;  Proc Date: 06/16/2012;  Comments: postpartum         CURRENT MEDICATIONS:    No current facility-administered medications for this encounter.    Current Outpatient Medications:      cephALEXin (KEFLEX) 500 MG capsule, Take 1 capsule (500 mg) by mouth 4 times daily for 10 days, Disp: 40 capsule, Rfl: 0     amoxicillin (AMOXIL) 500 MG capsule, Take 1 capsule (500 mg) by mouth 2 times daily (Patient not taking: Reported on 1/5/2023), Disp: 20 capsule, Rfl: 0     ARIPiprazole (ABILIFY) 2 MG tablet, TAKE 1 TABLET BY MOUTH EVERY OTHER DAY (Patient not taking: Reported on 1/5/2023), Disp: , Rfl:      aspirin (ASA) 81 MG chewable tablet, Take 81 mg by mouth, Disp: , Rfl:      buPROPion (WELLBUTRIN XL) 150 MG 24 hr tablet, TAKE 3 TABLETS BY MOUTH EVERY DAY, Disp: , Rfl:      busPIRone (BUSPAR) 15 MG tablet, , Disp: , Rfl:      cholecalciferol 50 MCG (2000 UT) CAPS, Take 1 tablet by mouth daily, Disp: , Rfl:      gabapentin (NEURONTIN) 300 MG capsule, , Disp: , Rfl:      metoprolol succinate ER (TOPROL XL) 100 MG 24 hr tablet, Take 1 tablet (100 mg) by mouth daily, Disp: 90 tablet, Rfl: 3     norethindrone (INCASSIA) 0.35 MG tablet, Take 1 tablet (0.35 mg) by  "mouth daily, Disp: 84 tablet, Rfl: 3     traZODone (DESYREL) 50 MG tablet, TAKE 1/2-1 TABLET BY MOUTH EVERY NIGHT AT BEDTIME AS NEEDED FOR SLEEP (Patient not taking: Reported on 1/5/2023), Disp: , Rfl:      venlafaxine (EFFEXOR-XR) 75 MG 24 hr capsule, Take 225 mg by mouth, Disp: , Rfl:       ALLERGIES:  Allergies   Allergen Reactions     Oxycodone Nausea     Weakness, seen in ER for this     Percocet [Oxycodone-Acetaminophen] Nausea and Vomiting     Blood-Group Specific Substance Unknown     History of anti-E and anti-Cw.  Expect delays in obtaining blood for transfusion.        FAMILY HISTORY:  Family History   Problem Relation Age of Onset     Varicose Veins Mother      Obesity Mother      Sleep Apnea Mother      Heart Disease Father      Diabetes Father      Hypertension Father      Sleep Apnea Father      Asthma Sister      Cancer Maternal Grandmother      Cerebrovascular Disease Paternal Grandmother      Diabetes Paternal Grandfather      CABG Paternal Grandfather      Leukemia Maternal Aunt      Diabetes Paternal Uncle      Cancer Cousin      Diabetes Type 2  Cousin      CABG Maternal Great-Grandfather      Heart Failure Maternal Great-Grandfather        SOCIAL HISTORY:   Social History     Socioeconomic History     Marital status:    Tobacco Use     Smoking status: Never     Smokeless tobacco: Never   Substance and Sexual Activity     Alcohol use: Yes     Alcohol/week: 0.0 - 1.0 standard drinks     Drug use: No     Sexual activity: Yes     Partners: Male     Birth control/protection: Pill       VITALS:  Patient Vitals for the past 24 hrs:   BP Temp Temp src Pulse Resp SpO2 Height Weight   03/20/23 1129 (!) 149/83 98.3  F (36.8  C) Oral 78 16 96 % 1.727 m (5' 8\") 101.2 kg (223 lb)       PHYSICAL EXAM    Physical Exam  Vitals and nursing note reviewed.   Constitutional:       General: She is not in acute distress.     Appearance: She is obese. She is not toxic-appearing.   HENT:      Head: " Normocephalic.      Right Ear: External ear normal.      Left Ear: External ear normal.   Eyes:      Conjunctiva/sclera: Conjunctivae normal.   Pulmonary:      Effort: Pulmonary effort is normal. No respiratory distress.   Musculoskeletal:         General: No deformity or signs of injury. Normal range of motion.      Cervical back: Normal range of motion.      Comments: Examination of the right fifth toe does reveal the toe showing edema, erythema, and tenderness to touch.  There is no obvious paronychia, no necrosis, no gross evidence of trauma.  Erythema is not streaking up into the foot or past the ankle.  Findings would be consistent with minor soft tissue infection.  Remainder of exam is benign.   Skin:     General: Skin is warm.   Neurological:      General: No focal deficit present.      Mental Status: She is alert. Mental status is at baseline.      Sensory: No sensory deficit.      Motor: No weakness.          LAB:  All pertinent labs reviewed and interpreted.       RADIOLOGY:  Reviewed all pertinent imaging. Please see official radiology report.  No orders to display       Noman Munguia PA-C  Emergency Medicine  [facility]     Noman Munguia PA-C  03/20/23 1205

## 2023-04-03 ENCOUNTER — TRANSFERRED RECORDS (OUTPATIENT)
Dept: HEALTH INFORMATION MANAGEMENT | Facility: CLINIC | Age: 38
End: 2023-04-03
Payer: COMMERCIAL

## 2023-04-27 ENCOUNTER — TELEPHONE (OUTPATIENT)
Dept: ENDOCRINOLOGY | Facility: CLINIC | Age: 38
End: 2023-04-27

## 2023-04-27 ENCOUNTER — VIRTUAL VISIT (OUTPATIENT)
Dept: ENDOCRINOLOGY | Facility: CLINIC | Age: 38
End: 2023-04-27
Payer: COMMERCIAL

## 2023-04-27 ENCOUNTER — PRE VISIT (OUTPATIENT)
Dept: ENDOCRINOLOGY | Facility: CLINIC | Age: 38
End: 2023-04-27

## 2023-04-27 VITALS — WEIGHT: 230 LBS | HEIGHT: 68 IN | BODY MASS INDEX: 34.86 KG/M2

## 2023-04-27 DIAGNOSIS — G47.33 OBSTRUCTIVE SLEEP APNEA: ICD-10-CM

## 2023-04-27 DIAGNOSIS — I10 BENIGN ESSENTIAL HYPERTENSION: ICD-10-CM

## 2023-04-27 DIAGNOSIS — E66.811 CLASS 1 OBESITY WITH SERIOUS COMORBIDITY AND BODY MASS INDEX (BMI) OF 34.0 TO 34.9 IN ADULT, UNSPECIFIED OBESITY TYPE: Primary | ICD-10-CM

## 2023-04-27 PROCEDURE — 99204 OFFICE O/P NEW MOD 45 MIN: CPT | Mod: VID | Performed by: NURSE PRACTITIONER

## 2023-04-27 RX ORDER — SEMAGLUTIDE 0.25 MG/.5ML
0.25 INJECTION, SOLUTION SUBCUTANEOUS
Qty: 2 ML | Refills: 0 | Status: SHIPPED | OUTPATIENT
Start: 2023-04-27 | End: 2023-10-13

## 2023-04-27 RX ORDER — DEXTROAMPHETAMINE SACCHARATE, AMPHETAMINE ASPARTATE MONOHYDRATE, DEXTROAMPHETAMINE SULFATE AND AMPHETAMINE SULFATE 2.5; 2.5; 2.5; 2.5 MG/1; MG/1; MG/1; MG/1
CAPSULE, EXTENDED RELEASE ORAL
COMMUNITY
Start: 2023-04-13 | End: 2024-09-20

## 2023-04-27 RX ORDER — SEMAGLUTIDE 0.5 MG/.5ML
0.5 INJECTION, SOLUTION SUBCUTANEOUS
Qty: 2 ML | Refills: 1 | Status: SHIPPED | OUTPATIENT
Start: 2023-05-27 | End: 2023-10-13

## 2023-04-27 ASSESSMENT — PAIN SCALES - GENERAL: PAINLEVEL: NO PAIN (0)

## 2023-04-27 NOTE — TELEPHONE ENCOUNTER
PA Initiation    Medication: Wegovy   Insurance Company: [employer] - Phone [phone] Fax [phone]  Pharmacy Filling the Rx:    Filling Pharmacy Phone:    Filling Pharmacy Fax:    Start Date: 4/27/2023    [id number]

## 2023-04-27 NOTE — ASSESSMENT & PLAN NOTE
"Very early onset obesity with significant family history of obesity. Never able to lose more than 15lb at a time, even when working with weight management clinic a few years ago. Tried phentermine with no perceived benefit or weight loss. Currently, feels she is at \"breaking point\" and ready to get a handle on her weight. Wants to help her children not deal with same issues she has.     Treated at [facility] in the past for binge eating disorder. Has noticed some of these tendencies coming back in the last 3-4 weeks with increased stress/ turmoil in her relationship. Otherwise, had not noticed a regular pattern of binge eating. Describes no true eating pattern, snacking rather than eating meals, most food at the end of the day. Will avoid eating around others due to not wanting to be looked at differently while eating. Currently working with therapist. Has dietitian visit scheduled. Do not feel she needs eating disorder clinic based on today's conversation but will continue to assess, we discussed that if symptoms change over time we may need to discuss treatment.     We discussed eating at a more regular schedule and having protein at every eating episode to help prevent excessive hunger. Discussed she doesn't necessarily have to call it a meal but having an eating episode in the AM , around lunch, and then dinner time. If needing to snack, will assess hunger ahead of snacking. Has visit with RD scheduled in 4 weeks.     We discussed role of AOM. We discussed topiramate and wegovy. She recently started on adderall otherwise we could have consider vyvanse too. We decided to see if wegovy would be covered first. If no GLP1, we'll try topiramate.     Plan:  Start wegovy- 0.25mg x 4 weeks then 0.5mg x 4 weeks   Lauren Bloch MTM Pharmacist in 6-8 weeks to reassess   Work on assessing hunger before meals  Plan ahead your morning and afternoon eating episodes/ snacks   Try to add protein to every eating episode   See " dietitian   Try finding activities to do with kids   Follow up with me 3 months

## 2023-04-27 NOTE — PATIENT INSTRUCTIONS
"Thank you for allowing us the privilege of caring for you. We hope we provided you with the excellent service you deserve.   Please let us know if there is anything else we can do for you so that we can be sure you are completely satisfied with your care experience.    To ensure the quality of our services you may be receiving a patient satisfaction survey from an independent patient satisfaction monitoring company.    The greatest compliment you can give is a \"Likely to Recommend\"    Your visit was with Ara Aguirre NP today.    Instructions per today's visit:     Jens Costa, it was great to visit with you today.  Here is a review of our visit.  If our clinic scheduler is not able to reach you please call [phone] to schedule your next appointments.    Plan:  Start wegovy- 0.25mg x 4 weeks then 0.5mg x 4 weeks   Lauren Bloch MTM Pharmacist in 6-8 weeks to reassess   Work on assessing hunger before meals  Plan ahead your morning and afternoon eating episodes/ snacks   Try to add protein to every eating episode   See dietitian   Try finding activities to do with kids   Follow up with me 3 months       Information about Video Visits with [facility]: video visit information  _________________________________________________________________________________________________________________________________________________________  If you are asked by your clinic team to have your blood pressure checked:  [facility] do offer several locations for blood pressure checks. Please follow the below link to schedule an appointment. Scheduling an appointment at the pharmacy for a blood pressure check is now preferred.    Appointment Plus (appointment-plus.[facility])  _________________________________________________________________________________________________________________________________________________________  Important contact and scheduling information:  Please call our contact center at " [phone] to schedule your next appointments.  To find a lab location near you, please call [phone].  For any nursing questions or concerns call Niesha Grayson LPN at [phone] or Venice Merritt RN at [phone]  Please call during clinic hours Monday through Friday 8:00a - 4:00p if you have questions or you can contact us via [employer]am[employer]art at anytime and we will reply during clinic hours.    Lab results will be communicated through My Chart or letter (if My Chart not used). Please call the clinic if you have not received communication after 1 week or if you have any questions.?  Clinic Fax: [phone]    _________________________________________________________________________________________________________________________________________________________  Meal Replacement Products:    Here is the link to our new e-store where you can purchase our meal replacement products    [facility] E-Store  [email or web address]    The one week starter kit is a great way to sample a variety of products and see what works for you.    If you want more information about the product go to: [email or web address]    If you are an employee or [facility] or [facility] please contact your care team for a 10% estore discount    Free Shipping for orders over $75     Benefits of meal replacements products:    Portion and calorie control  Improved nutrition  Structured eating  Simplified food choices  Avoid contact with trigger foods  _________________________________________________________________________________________________________________________________________________________  Interested in working with a health ?  Health coaches work with you to improve your overall health and wellbeing.  They look at the whole person, and may involve discussion of different areas of life, including, but not limited to the four pillars of health (sleep, exercise, nutrition, and  stress management). Discuss with your care team if you would like to start working a health .  Health Coaching-3 Pack: Schedule by calling [phone]    $99 for three health coaching visits    Visits may be done in person or via phone    Coaching is a partnership between the  and the client; Coaches do not prescribe or diagnose    Coaching helps inspire the client to reach his/her personal goals   _________________________________________________________________________________________________________________________________________________________  24 Week Healthy Lifestyle Plan:    Our mission in the 24-week Healthy Lifestyle Plan is to provide you with individualized care by giving you the tools, education and support you need to lose weight and maintain a healthy lifestyle. In your 24-week journey, you ll be supported by a dedicated weight loss team that includes registered dietitians, medical weight management providers, health coaches, and nurses -- all with special expertise in weight loss -- to help you every step of the way.     Monthly meetings with your registered dietician or medical weight management provider help to review your progress, update your care plan, and make any adjustments needed to ensure success. Between these visits, weekly and bi-weekly health  visits will help you focus on the four pillars of weight loss -- stress, sleep, nutrition, and exercise -- and how you can best adapt each to achieve sustainable weight loss results.    In addition, you will be given exclusive access to online wellbeing classes through [email or web address].  Your initial visit will be with a medical weight management provider who will help to understand your weight loss goals and ensure this program is the right fit for you. Please let our team know if you are interested in the 24 week plan by sending a message to your care team or calling [phone] to  schedule.  _________________________________________________________________________________________________________________________________________________________  __________  [facility] Get Moving Program  Our team of physical therapists is trained to help you understand and take control of your condition. They will perform a thorough evaluation to determine your ability for activity and develop a customized plan to fit your goals and physical ability.  Scheduling: Unsure if the Get Moving program is right for you? Discuss the program with your medical provider or diabetes educator. You can also call us at [phone] to ask questions or schedule an appointment.   [facility] Get Moving Program  ____________________________________________________________________________________________________________________________________________________________________________   [employer] Diabetes Prevention Program (DPP)  If you have prediabetes and Medicare please contact us via TowerMet[employer]Xt to learn more about the Diabetes Prevention Program (DPP)  Program Details:   [employer] offers the year-long Diabetes Prevention Program (DPP). The program helps you to make lifestyle changes that prevent or delay type 2 diabetes by supporting healthy eating, increased physical activity, stress reduction and use of coping skills.   On average, previous [facility] DPP cohorts have lost and maintained at least 5% of their starting weight throughout the program and averaged more than 150 minutes of physical activity per week.  Participants meet weekly for one-hour group sessions over sixteen weeks, every other week for the next 8 weeks, and monthly for the last six months.   A year-long maintenance program is also available for participants who complete the first year.   Location & Cost:   During the COVID-19 Public Health Emergency, the program is offered virtually. When in-person classes can resume, they  will be held at [facility].  For people with Medicare, the program is covered in full. A self-pay option will also be available for those with non-Medicare insurance plans.   ______________________________________________________________________________________________________________________________________________________________________________________________________________________________    To work with a Behavioral Health Psychologist:    Call to schedule:    Karson Ochoa - [phone]  Connie Sanchez - [phone]  Tessa Benitez - [phone]  Annabelle Steele - [phone]   Jazzmine Luciano PhD (cannot accept Medicare) [phone]        Thank you,   [facility] Comprehensive Weight Management Team

## 2023-04-27 NOTE — LETTER
"2023       RE: Diane Costa  [address] [facility] [address]     Dear Colleague,    Thank you for referring your patient, Diane Costa, to the [facility] WEIGHT MANAGEMENT [facility]INIC [place] at [facility]. Please see a copy of my visit note below.    66 minutes spent by me on the date of the encounter doing chart review, history and exam, documentation and further activities per the note    New Medical Weight Management Consult    PATIENT:  Diane Costa  MRN:         [id number]  :         1985  ALISHA:         2023    Dear Dr. Jefferson    I had the pleasure of seeing your patient, Diane Costa. Full intake/assessment was done to determine barriers to weight loss success and develop a treatment plan. Diane Costa is a 38 year old female interested in treatment of medical problems associated with excess weight. She has a height of 5' 8\", a weight of 230 lbs 0 oz, and the calculated Body mass index is 34.97 kg/m .      Assessment & Plan   Problem List Items Addressed This Visit          Respiratory    Obstructive sleep apnea       Digestive    Class 1 obesity with serious comorbidity and body mass index (BMI) of 34.0 to 34.9 in adult - Primary     Very early onset obesity with significant family history of obesity. Never able to lose more than 15lb at a time, even when working with weight management clinic a few years ago. Tried phentermine with no perceived benefit or weight loss. Currently, feels she is at \"breaking point\" and ready to get a handle on her weight. Wants to help her children not deal with same issues she has.     [facility] eating disorder. Has noticed some of these tendencies coming back in the last 3-4 weeks with increased stress/ turmoil in her relationship. Otherwise, had not noticed a regular pattern of binge eating. Describes no true eating pattern, " snacking rather than eating meals, most food at the end of the day. Will avoid eating around others due to not wanting to be looked at differently while eating. Currently working with therapist. Has dietitian visit scheduled. Do not feel she needs eating disorder clinic based on today's conversation but will continue to assess, we discussed that if symptoms change over time we may need to discuss treatment.     We discussed eating at a more regular schedule and having protein at every eating episode to help prevent excessive hunger. Discussed she doesn't necessarily have to call it a meal but having an eating episode in the AM , around lunch, and then dinner time. If needing to snack, will assess hunger ahead of snacking. Has visit with RD scheduled in 4 weeks.     We discussed role of AOM. We discussed topiramate and wegovy. She recently started on adderall otherwise we could have consider vyvanse too. We decided to see if wegovy would be covered first. If no GLP1, we'll try topiramate.     Plan:  Start wegovy- 0.25mg x 4 weeks then 0.5mg x 4 weeks   Lauren Bl[facility] Pharmacist in 6-8 weeks to reassess   Work on assessing hunger before meals  Plan ahead your morning and afternoon eating episodes/ snacks   Try to add protein to every eating episode   See dietitian   Try finding activities to do with kids   Follow up with me 3 months          Relevant Medications    amphetamine-dextroamphetamine (ADDERALL XR) 10 MG 24 hr capsule    Semaglutide-Weight Management (WEGOVY) 0.25 MG/0.5ML pen    Semaglutide-Weight Management (WEGOVY) 0.5 MG/0.5ML pen (Start on 5/27/2023)    Other Relevant Orders    Med Therapy Management Referral       Circulatory    Benign essential hypertension          She has the following co-morbidities:        4/26/2023     7:53 AM   --   I have the following health issues associated with obesity Sleep Apnea    Fatty Liver   I have the following symptoms associated with obesity Knee Pain     "Depression    Back Pain    Fatigue    Groin Rash      Dyslipidemia   Elevated ALT   Fib4 0.63 (calculated from labs since 8/2023 from care everywhere)     AZALEA and insomnia, falls asleep without mask. When uses CPAP it is helpful.          View : No data to display.                    4/26/2023     7:53 AM   Referring Provider   Please name the provider who referred you to Medical Weight Management  If you do not know, please answer \"I Don't Know\" [place]           4/26/2023     7:53 AM   Weight History   How concerned are you about your weight? Very Concerned   I became overweight As a Child   The following factors have contributed to my weight gain Mental Health Issues    Eating Wrong Types of Food    Eating Too Much    Lack of Exercise    Genetic (Runs in the Family)    Stress   I have tried the following methods to lose weight Watching Portions or Calories    Exercise    Weight Watchers    Medications   My lowest weight since age 18 was 200   My highest weight since age 18 was 240   The most weight I have ever lost was (lbs) 15   I have the following family history of obesity/being overweight My mother is overweight    My father is overweight    One or more of my siblings are overweight    Many of my relatives are overweight   How has your weight changed over the last year? Gained   How many pounds? 20 or so      Lost weight with all 3 pregnancies. No hx gestational diabetes.     Previously took phentermine without any significant benefit     Feels she is at a \"breaking point\" with her weight and wants to get a handle on things. Wants to better manage health for her children. Has young children who she would like to help avoid having same issues as her.           4/26/2023     7:53 AM   Diet Recall Review with Patient   If you do eat supper, what types of food do you typically eat? Pasta, Pizza, potatoes, some meat, some veggies,   If you do snack, what types of food do you typically eat? chocolate, chips, sweet " candies   How many glasses of juice do you drink in a typical day? 0   How many of glasses of milk do you drink in a typical day? 3   If you do drink milk, what type? 1%   How many 8oz glasses of sugar containing drinks such as Srin[employer]/sweet tea do you drink in a day? 1   How many cans/bottles of sugar pop/soda/tea/sports drinks do you drink in a day? 0   How many cans/bottles of diet pop/soda/tea or sports drink do you drink in a day? 0   How often do you have a drink of alcohol? Monthly or Less           4/26/2023     7:53 AM   Eating Habits   Generally, my meals include foods like these bread, pasta, rice, potatoes, corn, crackers, sweet dessert, pop, or juice Almost Everyday   Generally, my meals include foods like these fried meats, brats, burgers, french fries, pizza, cheese, chips, or ice cream Almost Everyday   Eat fast food (like [employer], [employer], [employer]) Less Than Weekly   Eat at a buffet or sit-down restaurant Less Than Weekly   Eat most of my meals in front of the TV or computer A Few Times a Week   Often skip meals, eat at random times, have no regular eating times A Few Times a Week   Rarely sit down for a meal but snack or graze throughout Almost Everyday   Eat extra snacks between meals Almost Everyday   Eat most of my food at the end of the day A Few Times a Week   Eat in the middle of the night or wake up at night to eat Never   Eat extra snacks to prevent or correct low blood sugar Once a Week   Eat to prevent acid reflux or stomach pain Once a Week   Worry about not having enough food to eat Never   I eat when I am depressed Almost Everyday   I eat when I am stressed Almost Everyday   I eat when I am bored Almost Everyday   I eat when I am anxious Almost Everyday   I eat when I am happy or as a reward Less Than Weekly   I feel hungry all the time even if I just have eaten A Few Times a Week   Feeling full is important to me Less Than Weekly   I finish all the food on my plate even if I  "am already full Never   I can't resist eating delicious food or walk past the good food/smell Less Than Weekly   I eat/snack without noticing that I am eating A Few Times a Week   I eat when I am preparing the meal A Few Times a Week   I eat more than usual when I see others eating A Few Times a Week   I have trouble not eating sweets, ice cream, cookies, or chips if they are around the house Almost Everyday   I think about food all day Almost Everyday   What foods, if any, do you crave? Sweets/Candy/Chocolate   Please list any other foods you crave? Crunchy snacks, chocolate, sweets     Previously diagnosed with binge eating disorder. Food has always been the \"center\" of what is going on- always a part of the plan.     Does identify with diagnosis of binge eating historically - most of food in the evenings, eating in the car to avoid people seeing     No purging     Doesn't really eat meals- ever since she was little - snacks through the day     Snacks triggered from hunger, habit, emotions etc   Dinner eats with family   Difficult to feel full           4/26/2023     7:53 AM   Amount of Food   I make myself vomit what I have eaten or use laxatives to get rid of food Never   I eat a large amount of food, like a loaf of bread, a box of cookies, a pint/quart of ice cream, all at once Weekly   I eat a large amount of food even when I am not hungry Weekly   I eat rapidly Weekly   I eat alone because I feel embarrassed and do not want others to see how much I have eaten Almost Everyday   I eat until I am uncomfortably full Monthly   I feel bad, disgusted, or guilty after I overeat Almost Everyday           4/26/2023     7:53 AM   Activity/Exercise History   How much of a typical 12 hour day do you spend sitting? Most of the Day   How much of a typical 12 hour day do you spend lying down? Less Than Half the Day   How much of a typical day do you spend walking/standing? Less Than Half the Day   How many hours (not " including work) do you spend on the TV/Video Games/Computer/Tablet/Phone? 2-3 Hours   How many times a week are you active for the purpose of exercise? Once a Week   What keeps you from being more active? Shortness of Breath    Lack of Time    Too tired    Worried People Will Look At Me   How many total minutes do you spend doing some activity for the purpose of exercising when you exercise? 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    Fatty liver     hx of transaminase elevations 2020.    GERD (gastroesophageal reflux disease)     controlled w/ Pepcid    History of blood transfusion reaction     has antibodies.    History of transfusion     IBS (irritable bowel syndrome)     Non-specified     Left knee pain     hx of patellar ligament repair (cadaver)    Mental disorder     history of anxiety    Mild major depression (H) 06/04/2021    AZALEA (obstructive sleep apnea)     \    Pregnancy-induced hypertension in third trimester 03/27/2018    Vertebral artery dissection (H) 02/2020    left, on lifelong baby ASA 81mg now.           4/26/2023     7:53 AM   Work/Social History Reviewed With Patient   My employment status is Part-Time   My job is    How much of your job is spent on the computer or phone? 100%   How many hours do you spend commuting to work daily? 6-minute drive   What is your marital status? /In a Relationship   If in a relationship, is your significant other overweight? Yes   If you have children, are they overweight? N/A   Who do you live with? Spouse and 3 children   Who does the food shopping? 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 Nearly Everyday   Over the past 2 weeks how often have you felt down, depressed, or hopeless? Nearly Everyday     Marital problems currently- working with therapist and psychiatrist  Had been feeling pretty stable on meds recently           4/26/2023     7:53 AM   Sleep History Reviewed With Patient   How many hours do you sleep at night? 4       MEDICATIONS:   Current Outpatient Medications   Medication Sig Dispense Refill    aspirin (ASA) 81 MG chewable tablet Take 81 mg by mouth      buPROPion (WELLBUTRIN XL) 150 MG 24 hr tablet TAKE 3 TABLETS BY MOUTH EVERY DAY      busPIRone (BUSPAR) 15 MG tablet       gabapentin (NEURONTIN) 300 MG capsule       Semaglutide-Weight Management (WEGOVY) 0.25 MG/0.5ML pen Inject 0.25 mg Subcutaneous every 7 days 2 mL 0    [START ON 5/27/2023] Semaglutide-Weight Management (WEGOVY) 0.5 MG/0.5ML pen Inject 0.5 mg Subcutaneous every 7 days 2 mL 1    traZODone (DESYREL) 50 MG tablet       amphetamine-dextroamphetamine (ADDERALL XR) 10 MG 24 hr capsule TAKE 1 CAPSULE BY MOUTH EVERY DAY FOR ADHD      cholecalciferol 50 MCG (2000 UT) CAPS Take 1 tablet by mouth daily      metoprolol succinate ER (TOPROL XL) 100 MG 24 hr tablet Take 1 tablet (100 mg) by mouth daily 90 tablet 3    norethindrone (INCASSIA) 0.35 MG tablet Take 1 tablet (0.35 mg) by mouth daily 84 tablet 3    venlafaxine (EFFEXOR-XR) 75 MG 24 hr capsule Take 225 mg by mouth         ALLERGIES:   Allergies   Allergen Reactions    Oxycodone Nausea     Weakness, seen in ER for this    Percocet [Oxycodone-Acetaminophen] Nausea and Vomiting    Blood-Group Specific Substance Unknown     History of anti-E and anti-Cw.  Expect delays in obtaining blood for transfusion.        Office Visit on 01/05/2023   Component Date Value Ref Range Status    Group A Strep antigen 01/05/2023 Positive (A)  Negative Final  "      Anti-obesity medication ROS:    HEENT  Hx of glaucoma: No    Cardiovascular  CAD: hx of vertebral artery dissection   HTN:No    Gastrointestinal  GERD:Yes - pepcid as needed   Constipation/diarrhea/GI issues:chronic IBS symptoms   Liver Dz:Yes    H/O Pancreatitis:No    Psychiatric  Bipolar: No  Anxiety:Yes  Depression:Yes  History of alcohol/drug abuse: No  Hx of eating disorder:Yes - binge eating, [facility]    Endocrine  Personal or family hx of MTC or MEN2:No  Diabetes/prediabetes: No    Neurologic:  Hx of seizures: No  Hx of migraines: No  Memory Impairment: No  Chronic pain/opioids use: No      History of kidney stones: No  Kidney disease: No  Current birth control: oral birth control    PHYSICAL EXAM:  Objective    Ht 1.727 m (5' 8\")   Wt 104.3 kg (230 lb)   BMI 34.97 kg/m    Physical Exam   GENERAL: Healthy, alert and no distress  EYES: Eyes grossly normal to inspection.  No discharge or erythema, or obvious scleral/conjunctival abnormalities.  RESP: No audible wheeze, cough, or visible cyanosis.  No visible retractions or increased work of breathing.    SKIN: Visible skin clear. No significant rash, abnormal pigmentation or lesions.  NEURO: Cranial nerves grossly intact.  Mentation and speech appropriate for age.  PSYCH: Mentation appears normal, affect normal/bright, judgement and insight intact, normal speech and appearance well-groomed.    Computed FIB-4 Calculation unavailable. Necessary lab results were not found in the last year.    Fib-4 < 1.3: No further evaluation at this point, unless other concerns  - If the Fib-4 is > 2.67,  Fibroscan and elective liver clinic referral  - Intermediate Fib-4 scores: Get a Fibroscan, consider repeating this in 1-2 years.    Sincerely,    Ara Aguirre NP        Diane Costa is a 38 year old who is being evaluated via a billable video visit.      How would you like to obtain your AVS? MyChart  If the video visit is dropped, the " invitation should be resent by: Text to cell phone: [phone]  Will anyone else be joining your video visit? No  If patient encounters technical issues they should call [phone]    During this virtual visit the patient is located in [place], patient verifies this as the location during the entirety of this visit.     Video-Visit Details  Video Start Time: 1330    Type of service:  Video Visit    Video End Time:1417    Originating Location (pt. Location): Home    Distant Location (provider location):  Off-site    Platform used for Video Visit: Fer Tello 4/27/2023      1:01 PM

## 2023-04-27 NOTE — PROGRESS NOTES
"66 minutes spent by me on the date of the encounter doing chart review, history and exam, documentation and further activities per the note    New Medical Weight Management Consult    PATIENT:  Diane Costa  MRN:         [id number]  :         1985  ALISHA:         2023    Dear Dr. Jefferson    I had the pleasure of seeing your patient, Diane Costa. Full intake/assessment was done to determine barriers to weight loss success and develop a treatment plan. Diane Costa is a 38 year old female interested in treatment of medical problems associated with excess weight. She has a height of 5' 8\", a weight of 230 lbs 0 oz, and the calculated Body mass index is 34.97 kg/m .      Assessment & Plan   Problem List Items Addressed This Visit        Respiratory    Obstructive sleep apnea       Digestive    Class 1 obesity with serious comorbidity and body mass index (BMI) of 34.0 to 34.9 in adult - Primary     Very early onset obesity with significant family history of obesity. Never able to lose more than 15lb at a time, even when working with weight management clinic a few years ago. Tried phentermine with no perceived benefit or weight loss. Currently, feels she is at \"breaking point\" and ready to get a handle on her weight. Wants to help her children not deal with same issues she has.     Treated at [facility] in the past for binge eating disorder. Has noticed some of these tendencies coming back in the last 3-4 weeks with increased stress/ turmoil in her relationship. Otherwise, had not noticed a regular pattern of binge eating. Describes no true eating pattern, snacking rather than eating meals, most food at the end of the day. Will avoid eating around others due to not wanting to be looked at differently while eating. Currently working with therapist. Has dietitian visit scheduled. Do not feel she needs eating disorder clinic based on today's conversation but will continue to assess, we " discussed that if symptoms change over time we may need to discuss treatment.     We discussed eating at a more regular schedule and having protein at every eating episode to help prevent excessive hunger. Discussed she doesn't necessarily have to call it a meal but having an eating episode in the AM , around lunch, and then dinner time. If needing to snack, will assess hunger ahead of snacking. Has visit with RD scheduled in 4 weeks.     We discussed role of AOM. We discussed topiramate and wegovy. She recently started on adderall otherwise we could have consider vyvanse too. We decided to see if wegovy would be covered first. If no GLP1, we'll try topiramate.     Plan:  Start wegovy- 0.25mg x 4 weeks then 0.5mg x 4 weeks   Lauren Bloch MTM Pharmacist in 6-8 weeks to reassess   Work on assessing hunger before meals  Plan ahead your morning and afternoon eating episodes/ snacks   Try to add protein to every eating episode   See dietitian   Try finding activities to do with kids   Follow up with me 3 months          Relevant Medications    amphetamine-dextroamphetamine (ADDERALL XR) 10 MG 24 hr capsule    Semaglutide-Weight Management (WEGOVY) 0.25 MG/0.5ML pen    Semaglutide-Weight Management (WEGOVY) 0.5 MG/0.5ML pen (Start on 5/27/2023)    Other Relevant Orders    Med Therapy Management Referral       Circulatory    Benign essential hypertension          She has the following co-morbidities:        4/26/2023     7:53 AM   --   I have the following health issues associated with obesity Sleep Apnea    Fatty Liver   I have the following symptoms associated with obesity Knee Pain    Depression    Back Pain    Fatigue    Groin Rash      Dyslipidemia   Elevated ALT   Fib4 0.63 (calculated from labs since 8/2023 from care everywhere)     AZALEA and insomnia, falls asleep without mask. When uses CPAP it is helpful.          View : No data to display.                    4/26/2023     7:53 AM   Referring Provider   Please name  "the provider who referred you to Medical Weight Management  If you do not know, please answer \"I Don't Know\" Ronny           4/26/2023     7:53 AM   Weight History   How concerned are you about your weight? Very Concerned   I became overweight As a Child   The following factors have contributed to my weight gain Mental Health Issues    Eating Wrong Types of Food    Eating Too Much    Lack of Exercise    Genetic (Runs in the Family)    Stress   I have tried the following methods to lose weight Watching Portions or Calories    Exercise    Weight Watchers    Medications   My lowest weight since age 18 was 200   My highest weight since age 18 was 240   The most weight I have ever lost was (lbs) 15   I have the following family history of obesity/being overweight My mother is overweight    My father is overweight    One or more of my siblings are overweight    Many of my relatives are overweight   How has your weight changed over the last year? Gained   How many pounds? 20 or so      Lost weight with all 3 pregnancies. No hx gestational diabetes.     Previously took phentermine without any significant benefit     Feels she is at a \"breaking point\" with her weight and wants to get a handle on things. Wants to better manage health for her children. Has young children who she would like to help avoid having same issues as her.           4/26/2023     7:53 AM   Diet Recall Review with Patient   If you do eat supper, what types of food do you typically eat? Pasta, Pizza, potatoes, some meat, some veggies,   If you do snack, what types of food do you typically eat? chocolate, chips, sweet candies   How many glasses of juice do you drink in a typical day? 0   How many of glasses of milk do you drink in a typical day? 3   If you do drink milk, what type? 1%   How many 8oz glasses of sugar containing drinks such as Srinivas-Aid/sweet tea do you drink in a day? 1   How many cans/bottles of sugar pop/soda/tea/sports drinks do you " drink in a day? 0   How many cans/bottles of diet pop/soda/tea or sports drink do you drink in a day? 0   How often do you have a drink of alcohol? Monthly or Less           4/26/2023     7:53 AM   Eating Habits   Generally, my meals include foods like these bread, pasta, rice, potatoes, corn, crackers, sweet dessert, pop, or juice Almost Everyday   Generally, my meals include foods like these fried meats, brats, burgers, french fries, pizza, cheese, chips, or ice cream Almost Everyday   Eat fast food (like McDonalds, Burger Arben, Taco Bell) Less Than Weekly   Eat at a buffet or sit-down restaurant Less Than Weekly   Eat most of my meals in front of the TV or computer A Few Times a Week   Often skip meals, eat at random times, have no regular eating times A Few Times a Week   Rarely sit down for a meal but snack or graze throughout Almost Everyday   Eat extra snacks between meals Almost Everyday   Eat most of my food at the end of the day A Few Times a Week   Eat in the middle of the night or wake up at night to eat Never   Eat extra snacks to prevent or correct low blood sugar Once a Week   Eat to prevent acid reflux or stomach pain Once a Week   Worry about not having enough food to eat Never   I eat when I am depressed Almost Everyday   I eat when I am stressed Almost Everyday   I eat when I am bored Almost Everyday   I eat when I am anxious Almost Everyday   I eat when I am happy or as a reward Less Than Weekly   I feel hungry all the time even if I just have eaten A Few Times a Week   Feeling full is important to me Less Than Weekly   I finish all the food on my plate even if I am already full Never   I can't resist eating delicious food or walk past the good food/smell Less Than Weekly   I eat/snack without noticing that I am eating A Few Times a Week   I eat when I am preparing the meal A Few Times a Week   I eat more than usual when I see others eating A Few Times a Week   I have trouble not eating sweets,  "ice cream, cookies, or chips if they are around the house Almost Everyday   I think about food all day Almost Everyday   What foods, if any, do you crave? Sweets/Candy/Chocolate   Please list any other foods you crave? Crunchy snacks, chocolate, sweets     Previously diagnosed with binge eating disorder. Food has always been the \"center\" of what is going on- always a part of the plan.     Does identify with diagnosis of binge eating historically - most of food in the evenings, eating in the car to avoid people seeing     No purging     Doesn't really eat meals- ever since she was little - snacks through the day     Snacks triggered from hunger, habit, emotions etc   Dinner eats with family   Difficult to feel full           4/26/2023     7:53 AM   Amount of Food   I make myself vomit what I have eaten or use laxatives to get rid of food Never   I eat a large amount of food, like a loaf of bread, a box of cookies, a pint/quart of ice cream, all at once Weekly   I eat a large amount of food even when I am not hungry Weekly   I eat rapidly Weekly   I eat alone because I feel embarrassed and do not want others to see how much I have eaten Almost Everyday   I eat until I am uncomfortably full Monthly   I feel bad, disgusted, or guilty after I overeat Almost Everyday           4/26/2023     7:53 AM   Activity/Exercise History   How much of a typical 12 hour day do you spend sitting? Most of the Day   How much of a typical 12 hour day do you spend lying down? Less Than Half the Day   How much of a typical day do you spend walking/standing? Less Than Half the Day   How many hours (not including work) do you spend on the TV/Video Games/Computer/Tablet/Phone? 2-3 Hours   How many times a week are you active for the purpose of exercise? Once a Week   What keeps you from being more active? Shortness of Breath    Lack of Time    Too tired    Worried People Will Look At Me   How many total minutes do you spend doing some activity " for the purpose of exercising when you exercise? 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     Fatty liver     hx of transaminase elevations 2020.     GERD (gastroesophageal reflux disease)     controlled w/ Pepcid     History of blood transfusion reaction     has antibodies.     History of transfusion      IBS (irritable bowel syndrome)     Non-specified      Left knee pain     hx of patellar ligament repair (cadaver)     Mental disorder     history of anxiety     Mild major depression (H) 06/04/2021     AZALEA (obstructive sleep apnea)     \     Pregnancy-induced hypertension in third trimester 03/27/2018     Vertebral artery dissection (H) 02/2020    left, on lifelong baby ASA 81mg now.           4/26/2023     7:53 AM   Work/Social History Reviewed With Patient   My employment status is Part-Time   My job is    How much of your job is spent on the computer or phone? 100%   How many hours do you spend commuting to work daily? 6-minute drive   What is your marital status? /In a Relationship   If in a relationship, is your significant other overweight? Yes   If you have children, are they overweight? N/A   Who do you live with? Spouse and 3 children   Who does the food shopping? 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 Nearly Everyday   Over the past 2 weeks how often have you felt down, depressed, or hopeless? Nearly Everyday     Marital problems currently- working with therapist and psychiatrist  Had been feeling pretty stable on  meds recently           4/26/2023     7:53 AM   Sleep History Reviewed With Patient   How many hours do you sleep at night? 4       MEDICATIONS:   Current Outpatient Medications   Medication Sig Dispense Refill     aspirin (ASA) 81 MG chewable tablet Take 81 mg by mouth       buPROPion (WELLBUTRIN XL) 150 MG 24 hr tablet TAKE 3 TABLETS BY MOUTH EVERY DAY       busPIRone (BUSPAR) 15 MG tablet        gabapentin (NEURONTIN) 300 MG capsule        Semaglutide-Weight Management (WEGOVY) 0.25 MG/0.5ML pen Inject 0.25 mg Subcutaneous every 7 days 2 mL 0     [START ON 5/27/2023] Semaglutide-Weight Management (WEGOVY) 0.5 MG/0.5ML pen Inject 0.5 mg Subcutaneous every 7 days 2 mL 1     traZODone (DESYREL) 50 MG tablet        amphetamine-dextroamphetamine (ADDERALL XR) 10 MG 24 hr capsule TAKE 1 CAPSULE BY MOUTH EVERY DAY FOR ADHD       cholecalciferol 50 MCG (2000 UT) CAPS Take 1 tablet by mouth daily       metoprolol succinate ER (TOPROL XL) 100 MG 24 hr tablet Take 1 tablet (100 mg) by mouth daily 90 tablet 3     norethindrone (INCASSIA) 0.35 MG tablet Take 1 tablet (0.35 mg) by mouth daily 84 tablet 3     venlafaxine (EFFEXOR-XR) 75 MG 24 hr capsule Take 225 mg by mouth         ALLERGIES:   Allergies   Allergen Reactions     Oxycodone Nausea     Weakness, seen in ER for this     Percocet [Oxycodone-Acetaminophen] Nausea and Vomiting     Blood-Group Specific Substance Unknown     History of anti-E and anti-Cw.  Expect delays in obtaining blood for transfusion.        Office Visit on 01/05/2023   Component Date Value Ref Range Status     Group A Strep antigen 01/05/2023 Positive (A)  Negative Final       Anti-obesity medication ROS:    HEENT  Hx of glaucoma: No    Cardiovascular  CAD: hx of vertebral artery dissection   HTN:No    Gastrointestinal  GERD:Yes - pepcid as needed   Constipation/diarrhea/GI issues:chronic IBS symptoms   Liver Dz:Yes    H/O Pancreatitis:No    Psychiatric  Bipolar:  "No  Anxiety:Yes  Depression:Yes  History of alcohol/drug abuse: No  Hx of eating disorder:Yes - binge eating, treated at [facility]    Endocrine  Personal or family hx of MTC or MEN2:No  Diabetes/prediabetes: No    Neurologic:  Hx of seizures: No  Hx of migraines: No  Memory Impairment: No  Chronic pain/opioids use: No      History of kidney stones: No  Kidney disease: No  Current birth control: oral birth control    PHYSICAL EXAM:  Objective    Ht 1.727 m (5' 8\")   Wt 104.3 kg (230 lb)   BMI 34.97 kg/m    Physical Exam   GENERAL: Healthy, alert and no distress  EYES: Eyes grossly normal to inspection.  No discharge or erythema, or obvious scleral/conjunctival abnormalities.  RESP: No audible wheeze, cough, or visible cyanosis.  No visible retractions or increased work of breathing.    SKIN: Visible skin clear. No significant rash, abnormal pigmentation or lesions.  NEURO: Cranial nerves grossly intact.  Mentation and speech appropriate for age.  PSYCH: Mentation appears normal, affect normal/bright, judgement and insight intact, normal speech and appearance well-groomed.    Computed FIB-4 Calculation unavailable. Necessary lab results were not found in the last year.    Fib-4 < 1.3: No further evaluation at this point, unless other concerns  - If the Fib-4 is > 2.67,  Fibroscan and elective liver clinic referral  - Intermediate Fib-4 scores: Get a Fibroscan, consider repeating this in 1-2 years.    Sincerely,    Ara Aguirre NP      "

## 2023-04-27 NOTE — NURSING NOTE
"(   Chief Complaint   Patient presents with     New Patient     New MWM    )    ( Weight: 104.3 kg (230 lb) )  ( Height: 172.7 cm (5' 8\") )  ( BMI (Calculated): 34.97 )  (   )  (   )  (   )  (   )  (   )  (   )    (   )  (   )  (   )  (   )  (   )  (   )  (   )    (   Patient Active Problem List   Diagnosis     Anxiety state     Benign essential hypertension     Mild major depression (H)     Paresthesia     Vitamin D deficiency     Obstructive sleep apnea     Insomnia, unspecified type     Parasomnia, unspecified type     Hypersomnia     Class 1 obesity due to excess calories with serious comorbidity and body mass index (BMI) of 34.0 to 34.9 in adult    )  (   Current Outpatient Medications   Medication Sig Dispense Refill     aspirin (ASA) 81 MG chewable tablet Take 81 mg by mouth       buPROPion (WELLBUTRIN XL) 150 MG 24 hr tablet TAKE 3 TABLETS BY MOUTH EVERY DAY       busPIRone (BUSPAR) 15 MG tablet        gabapentin (NEURONTIN) 300 MG capsule        traZODone (DESYREL) 50 MG tablet        amoxicillin (AMOXIL) 500 MG capsule Take 1 capsule (500 mg) by mouth 2 times daily (Patient not taking: Reported on 1/5/2023) 20 capsule 0     ARIPiprazole (ABILIFY) 2 MG tablet TAKE 1 TABLET BY MOUTH EVERY OTHER DAY (Patient not taking: Reported on 1/5/2023)       cholecalciferol 50 MCG (2000 UT) CAPS Take 1 tablet by mouth daily       metoprolol succinate ER (TOPROL XL) 100 MG 24 hr tablet Take 1 tablet (100 mg) by mouth daily 90 tablet 3     norethindrone (INCASSIA) 0.35 MG tablet Take 1 tablet (0.35 mg) by mouth daily 84 tablet 3     venlafaxine (EFFEXOR-XR) 75 MG 24 hr capsule Take 225 mg by mouth      )  ( Diabetes Eval:    )    ( Pain Eval:  No Pain (0) )    ( Wound Eval:       )    (   History   Smoking Status     Never   Smokeless Tobacco     Never    )    ( Signed By:  Nicko Tello; April 27, 2023; 1:05 PM )  "

## 2023-04-27 NOTE — PROGRESS NOTES
Diane Costa is a 38 year old who is being evaluated via a billable video visit.      How would you like to obtain your AVS? MyChart  If the video visit is dropped, the invitation should be resent by: Text to cell phone: [phone]  Will anyone else be joining your video visit? No  If patient encounters technical issues they should call [phone]    During this virtual visit the patient is located in [place], patient verifies this as the location during the entirety of this visit.     Video-Visit Details  Video Start Time: 1330    Type of service:  Video Visit    Video End Time:1417    Originating Location (pt. Location): Home    Distant Location (provider location):  Off-site    Platform used for Video Visit: Fer Tello 4/27/2023      1:01 PM   0

## 2023-05-09 ENCOUNTER — TELEPHONE (OUTPATIENT)
Dept: ENDOCRINOLOGY | Facility: CLINIC | Age: 38
End: 2023-05-09
Payer: COMMERCIAL

## 2023-05-09 NOTE — TELEPHONE ENCOUNTER
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 with edema surrounding  - MRI brain w/wo (5/6): possible DVA and concern for cavernous malformation  - left medullary hemorrhage vs cavernoma    Plan:  - Admit to NCC  - f/u CT and MRI per NSGY  - NSGY following, no acute surgical intervention at this time   - OP f/u w NSGY in 4-6 weeks with a repeat MRI Brain w/wo contrast   - Open intervention for cavernoma may be considered if findings are concordant on repeat imaging given first time hemorrhage, but will discuss in OP setting   - DSA likely not beneficial  - Neuro checks Q1  - SBP< 160 per NSGY  - intubated for airway protection, copious secretions and weak cough   - continue trials of spontaneous ventilator setting   - failed T-piece trial   - f/u daily ABG's  - Hold AC/AP  - Coags & CBC  - VN following  - PT/OT/SLP   Patient Returning Call  Reason for call:  Patient called back.  Information relayed to patient:  Informed of Dr Jefferson's message listed below.  Patient states she is little better since starting the effexor. States has good days and bad days, but most of the time is ok.  Patient states she was locked out of her My Chart, so is not getting messages.   Did transfer patient to the My Chart Support to see if can get back on My Chart.  Patient has additional questions:  No  If YES, what are your questions/concerns:    Okay to leave a detailed message?: No call back needed

## 2023-05-15 ENCOUNTER — TRANSFERRED RECORDS (OUTPATIENT)
Dept: HEALTH INFORMATION MANAGEMENT | Facility: CLINIC | Age: 38
End: 2023-05-15
Payer: COMMERCIAL

## 2023-05-18 ENCOUNTER — APPOINTMENT (OUTPATIENT)
Dept: URBAN - METROPOLITAN AREA CLINIC 260 | Age: 38
Setting detail: DERMATOLOGY
End: 2023-05-29

## 2023-05-18 VITALS — HEIGHT: 68 IN | WEIGHT: 223 LBS

## 2023-05-18 DIAGNOSIS — L82.1 OTHER SEBORRHEIC KERATOSIS: ICD-10-CM

## 2023-05-18 DIAGNOSIS — D22 MELANOCYTIC NEVI: ICD-10-CM

## 2023-05-18 DIAGNOSIS — L64.8 OTHER ANDROGENIC ALOPECIA: ICD-10-CM

## 2023-05-18 DIAGNOSIS — Z71.89 OTHER SPECIFIED COUNSELING: ICD-10-CM

## 2023-05-18 DIAGNOSIS — D18.0 HEMANGIOMA: ICD-10-CM

## 2023-05-18 DIAGNOSIS — L57.8 OTHER SKIN CHANGES DUE TO CHRONIC EXPOSURE TO NONIONIZING RADIATION: ICD-10-CM

## 2023-05-18 DIAGNOSIS — L81.4 OTHER MELANIN HYPERPIGMENTATION: ICD-10-CM

## 2023-05-18 PROBLEM — D18.01 HEMANGIOMA OF SKIN AND SUBCUTANEOUS TISSUE: Status: ACTIVE | Noted: 2023-05-18

## 2023-05-18 PROBLEM — D22.5 MELANOCYTIC NEVI OF TRUNK: Status: ACTIVE | Noted: 2023-05-18

## 2023-05-18 PROCEDURE — OTHER COUNSELING: OTHER

## 2023-05-18 PROCEDURE — OTHER PRESCRIPTION: OTHER

## 2023-05-18 PROCEDURE — 99214 OFFICE O/P EST MOD 30 MIN: CPT

## 2023-05-18 PROCEDURE — OTHER PHOTO-DOCUMENTATION: OTHER

## 2023-05-18 PROCEDURE — OTHER MIPS QUALITY: OTHER

## 2023-05-18 RX ORDER — SPIRONOLACTONE 50 MG/1
TABLET, FILM COATED ORAL QD
Qty: 90 | Refills: 1 | Status: ERX | COMMUNITY
Start: 2023-05-18

## 2023-05-18 RX ORDER — MINOXIDIL 2.5 MG/1
TABLET ORAL QD
Qty: 90 | Refills: 1 | Status: ERX | COMMUNITY
Start: 2023-05-18

## 2023-05-18 ASSESSMENT — LOCATION DETAILED DESCRIPTION DERM
LOCATION DETAILED: LEFT INFERIOR MEDIAL UPPER BACK
LOCATION DETAILED: LEFT MID-UPPER BACK
LOCATION DETAILED: LEFT MEDIAL UPPER BACK
LOCATION DETAILED: RIGHT SUPERIOR MEDIAL UPPER BACK
LOCATION DETAILED: LEFT SUPERIOR PARIETAL SCALP

## 2023-05-18 ASSESSMENT — LOCATION SIMPLE DESCRIPTION DERM
LOCATION SIMPLE: SCALP
LOCATION SIMPLE: RIGHT UPPER BACK
LOCATION SIMPLE: LEFT UPPER BACK

## 2023-05-18 ASSESSMENT — LOCATION ZONE DERM
LOCATION ZONE: SCALP
LOCATION ZONE: TRUNK

## 2023-06-05 ENCOUNTER — TRANSFERRED RECORDS (OUTPATIENT)
Dept: HEALTH INFORMATION MANAGEMENT | Facility: CLINIC | Age: 38
End: 2023-06-05
Payer: COMMERCIAL

## 2023-06-15 ENCOUNTER — TRANSFERRED RECORDS (OUTPATIENT)
Dept: HEALTH INFORMATION MANAGEMENT | Facility: CLINIC | Age: 38
End: 2023-06-15
Payer: COMMERCIAL

## 2023-06-30 ENCOUNTER — TRANSFERRED RECORDS (OUTPATIENT)
Dept: HEALTH INFORMATION MANAGEMENT | Facility: CLINIC | Age: 38
End: 2023-06-30
Payer: COMMERCIAL

## 2023-06-30 DIAGNOSIS — Z30.41 ENCOUNTER FOR SURVEILLANCE OF CONTRACEPTIVE PILLS: ICD-10-CM

## 2023-06-30 RX ORDER — ACETAMINOPHEN AND CODEINE PHOSPHATE 120; 12 MG/5ML; MG/5ML
0.35 SOLUTION ORAL DAILY
Qty: 84 TABLET | Refills: 1 | Status: SHIPPED | OUTPATIENT
Start: 2023-06-30 | End: 2024-02-16

## 2023-06-30 NOTE — TELEPHONE ENCOUNTER
Prescription approved per [facility] Refill Protocol.    Monse Stanford, RN, BSN, PHN  [facility]

## 2023-06-30 NOTE — TELEPHONE ENCOUNTER
Refill Request  Medication name: Pending Prescriptions:                       Disp   Refills    norethindrone (INCASSIA) 0.35 MG tablet   84 tab*3            Sig: Take 1 tablet (0.35 mg) by mouth daily    Requested Pharmacy:  [id number] [id number]  [place]

## 2023-07-14 ENCOUNTER — TRANSFERRED RECORDS (OUTPATIENT)
Dept: HEALTH INFORMATION MANAGEMENT | Facility: CLINIC | Age: 38
End: 2023-07-14

## 2023-08-15 DIAGNOSIS — I10 ESSENTIAL (PRIMARY) HYPERTENSION: ICD-10-CM

## 2023-08-15 RX ORDER — METOPROLOL SUCCINATE 100 MG/1
100 TABLET, EXTENDED RELEASE ORAL DAILY
Qty: 90 TABLET | Refills: 1 | Status: SHIPPED | OUTPATIENT
Start: 2023-08-15 | End: 2024-02-06

## 2023-08-15 NOTE — TELEPHONE ENCOUNTER
"Routing refill request to provider for review/approval because:  Patient needs to be seen because it has been more than 1 year since last office visit.    Last Written Prescription Date:  8/9/2022  Last Fill Quantity: 90,  # refills: 3   Last office visit provider:  8/9/2022     Requested Prescriptions   Pending Prescriptions Disp Refills    metoprolol succinate ER (TOPROL XL) 100 MG 24 hr tablet 90 tablet 3     Sig: Take 1 tablet (100 mg) by mouth daily       Beta-Blockers Protocol Passed - 8/15/2023 11:58 AM        Passed - Blood pressure under 140/90 in past 12 months     BP Readings from Last 3 Encounters:   03/20/23 126/67   02/16/23 118/82   01/05/23 114/75                 Passed - Patient is age 6 or older        Passed - Recent (12 mo) or future (30 days) visit within the authorizing provider's specialty     Patient has had an office visit with the authorizing provider or a provider within the authorizing providers department within the previous 12 mos or has a future within next 30 days. See \"Patient Info\" tab in inbasket, or \"Choose Columns\" in Meds & Orders section of the refill encounter.              Passed - Medication is active on med list             Mavis Lucia RN 08/15/23 12:34 PM  "

## 2023-08-15 NOTE — TELEPHONE ENCOUNTER
Refill Request  Medication name: Pending Prescriptions:                       Disp   Refills    metoprolol succinate ER (TOPROL XL) 100 M*90 tab*3            Sig: Take 1 tablet (100 mg) by mouth daily    Requested Pharmacy:  [id number]  [place]

## 2023-08-16 NOTE — TELEPHONE ENCOUNTER
Called pt to schedule and she also needs pre op for surgery in October, wanted to do all at one time, advised will send to Dr Jefferson to be sure ok  due to need and Dr Jefferson;s schedule being so tight, but did schedule for 10/13/23 pre op, med check

## 2023-09-17 ENCOUNTER — HEALTH MAINTENANCE LETTER (OUTPATIENT)
Age: 38
End: 2023-09-17

## 2023-09-20 NOTE — PROGRESS NOTES
"  Caller: AISHA    Relationship to patient: SELF    Best call back number: [phone]    Patient is needing: PT IS NEEDING TO SCHEDULE A \"BLADDER COCKTAIL\" SOMETIME THIS WEEK.  PLEASE CALL PT TO SCHEDULE        " ASSESSMENT:  1. Obstructive Sleep Apnea  diethylpropion 75 mg TbER   2. Essential Hypertriglyceridemia  diethylpropion 75 mg TbER   3. BMI 36.0-36.9,adult  diethylpropion 75 mg TbER         PLAN:  Follow up in 1 month.  Continue supplements.  Refill given for diethylpropion today.  Continue to work on weight loss for treatment of obstructive sleep apnea.  Significantly better when she loses weight.  Discussed motivation factors.  She will start journaling when she eats.  Recommend increasing movement throughout the day--sitting less, moving more.  Will increase activity over time to reach a goal of at least 150 minutes of moderate exercise each week.  Specifically discussed resistance training.  Behavior modification:  Cognitive restructuring exercises, journaling stressors, triggers for food cravings.  Dietary Intervention:  Reduced calorie, reduced carbohydrates, whole food diet.  Greater than 50% of this 15 minute visit was spent in counseling regarding patient's obesity, medications, dietary, exercise, and behavior modification recommendations.      SUBJECTIVE  Patient presents for treatment of chronic, comorbid conditions (obstructive sleep apnea, hypertriglyceridemia) using intensive therapeutic lifestyle interventions including nutrition, physical activity, and behavior management.  She is now been in since July 2016.  She feels like she has lost motivation and is ready to get back on track in.  She feels she is self sabotage his herself.  She had gotten down to about 203 pounds.  She then started getting off track and gained weight which causes worse feelings about herself and anxiety and she tends to develop and stop exercising and eating well.  Her sister is in our program and doing quite well and is a big motivating factor for her in coming back.  She is going today to title [employer] in [place] and is very excited about this.    Are you experiencing any side effects to the medications:  No  Hunger control:   poor  Exercise was discussed: yes  Taking supplements:  no  Discussed journaling food:  yes  Patient is pleased with the current results:  no  The patient is following the nutrition plan:  no  Barriers to losing weight:  Psychology:   fear of success    Patient Active Problem List   Diagnosis     Dyshidrosis     Irritable Bowel Syndrome     Vitamin D Deficiency     Anxiety     Essential Hypertriglyceridemia     Obstructive Sleep Apnea     Obesity (BMI 30-39.9)      (normal spontaneous vaginal delivery)     BMI 36.0-36.9,adult       Current Outpatient Prescriptions on File Prior to Visit   Medication Sig Dispense Refill     cholecalciferol, vitamin D3, (VITAMIN D3) 2,000 unit cap Take 1 tablet by mouth daily.       citalopram (CELEXA) 20 MG tablet Take 1.5 tablets (30 mg total) by mouth daily. 45 tablet 5     MULTIVITAMIN ORAL Take 1 tablet by mouth daily.       norethindrone-ethinyl estradiol 0.5/0.75/1 mg- 35 mcg per tablet Take 1 tablet by mouth daily. 84 tablet 3     OMEGA-3/DHA/EPA/FISH OIL (FISH OIL-OMEGA-3 FATTY ACIDS) 300-1,000 mg capsule Take 2 g by mouth daily.       [DISCONTINUED] diethylpropion 75 mg TbER Take 75 mg by mouth daily. 30 each 0     No current facility-administered medications on file prior to visit.            OBJECTIVE:  Vitals:    17 0705   BP: 112/60   Pulse: 74     Initial Weight: 231.7 lbs  Weight: 221 lb 6.4 oz (100.4 kg)  Weight loss from initial: 10.3  % Weight loss: 4.45 %  Body mass index is 34.16 kg/(m^2).  General:  Patient is alert, pleasant, no distress.  Patient is obese.       .  This note has been dictated using voice recognition software. Any grammatical or context distortions are unintentional and inherent to the software

## 2023-10-13 ENCOUNTER — OFFICE VISIT (OUTPATIENT)
Dept: FAMILY MEDICINE | Facility: CLINIC | Age: 38
End: 2023-10-13
Payer: COMMERCIAL

## 2023-10-13 ENCOUNTER — VIRTUAL VISIT (OUTPATIENT)
Dept: SLEEP MEDICINE | Facility: CLINIC | Age: 38
End: 2023-10-13
Payer: COMMERCIAL

## 2023-10-13 VITALS — BODY MASS INDEX: 33.8 KG/M2 | WEIGHT: 223 LBS | HEIGHT: 68 IN

## 2023-10-13 VITALS
WEIGHT: 219 LBS | OXYGEN SATURATION: 99 % | TEMPERATURE: 98.4 F | BODY MASS INDEX: 33.19 KG/M2 | HEART RATE: 87 BPM | SYSTOLIC BLOOD PRESSURE: 128 MMHG | DIASTOLIC BLOOD PRESSURE: 62 MMHG | HEIGHT: 68 IN

## 2023-10-13 DIAGNOSIS — R23.2 HOT FLASHES: ICD-10-CM

## 2023-10-13 DIAGNOSIS — I10 ESSENTIAL HYPERTENSION: ICD-10-CM

## 2023-10-13 DIAGNOSIS — G47.00 INSOMNIA, UNSPECIFIED TYPE: ICD-10-CM

## 2023-10-13 DIAGNOSIS — Z23 IMMUNIZATION DUE: ICD-10-CM

## 2023-10-13 DIAGNOSIS — G47.50 PARASOMNIA, UNSPECIFIED TYPE: ICD-10-CM

## 2023-10-13 DIAGNOSIS — Z01.818 PREOP GENERAL PHYSICAL EXAM: Primary | ICD-10-CM

## 2023-10-13 DIAGNOSIS — I77.74 DISSECTION OF VERTEBRAL ARTERY (H): ICD-10-CM

## 2023-10-13 DIAGNOSIS — N39.3 SUI (STRESS URINARY INCONTINENCE, FEMALE): ICD-10-CM

## 2023-10-13 DIAGNOSIS — G47.33 OBSTRUCTIVE SLEEP APNEA: Primary | ICD-10-CM

## 2023-10-13 DIAGNOSIS — G47.10 HYPERSOMNIA: ICD-10-CM

## 2023-10-13 LAB
ERYTHROCYTE [DISTWIDTH] IN BLOOD BY AUTOMATED COUNT: 12.2 % (ref 10–15)
HCT VFR BLD AUTO: 43.1 % (ref 35–47)
HGB BLD-MCNC: 14.3 G/DL (ref 11.7–15.7)
MCH RBC QN AUTO: 30.2 PG (ref 26.5–33)
MCHC RBC AUTO-ENTMCNC: 33.2 G/DL (ref 31.5–36.5)
MCV RBC AUTO: 91 FL (ref 78–100)
PLATELET # BLD AUTO: 249 10E3/UL (ref 150–450)
RBC # BLD AUTO: 4.74 10E6/UL (ref 3.8–5.2)
WBC # BLD AUTO: 8.4 10E3/UL (ref 4–11)

## 2023-10-13 PROCEDURE — 83002 ASSAY OF GONADOTROPIN (LH): CPT | Performed by: FAMILY MEDICINE

## 2023-10-13 PROCEDURE — 80048 BASIC METABOLIC PNL TOTAL CA: CPT | Performed by: FAMILY MEDICINE

## 2023-10-13 PROCEDURE — 99214 OFFICE O/P EST MOD 30 MIN: CPT | Mod: VID | Performed by: INTERNAL MEDICINE

## 2023-10-13 PROCEDURE — 84443 ASSAY THYROID STIM HORMONE: CPT | Performed by: FAMILY MEDICINE

## 2023-10-13 PROCEDURE — 83001 ASSAY OF GONADOTROPIN (FSH): CPT | Performed by: FAMILY MEDICINE

## 2023-10-13 PROCEDURE — 99214 OFFICE O/P EST MOD 30 MIN: CPT | Performed by: FAMILY MEDICINE

## 2023-10-13 PROCEDURE — 85027 COMPLETE CBC AUTOMATED: CPT | Performed by: FAMILY MEDICINE

## 2023-10-13 PROCEDURE — 36415 COLL VENOUS BLD VENIPUNCTURE: CPT | Performed by: FAMILY MEDICINE

## 2023-10-13 RX ORDER — MINOXIDIL 2.5 MG/1
TABLET ORAL
COMMUNITY
Start: 2023-08-15

## 2023-10-13 RX ORDER — SPIRONOLACTONE 50 MG/1
TABLET, FILM COATED ORAL
COMMUNITY
Start: 2023-08-15

## 2023-10-13 ASSESSMENT — SLEEP AND FATIGUE QUESTIONNAIRES
HOW LIKELY ARE YOU TO NOD OFF OR FALL ASLEEP WHILE WATCHING TV: MODERATE CHANCE OF DOZING
HOW LIKELY ARE YOU TO NOD OFF OR FALL ASLEEP WHILE SITTING QUIETLY AFTER LUNCH WITHOUT ALCOHOL: SLIGHT CHANCE OF DOZING
HOW LIKELY ARE YOU TO NOD OFF OR FALL ASLEEP WHILE SITTING AND READING: MODERATE CHANCE OF DOZING
HOW LIKELY ARE YOU TO NOD OFF OR FALL ASLEEP IN A CAR, WHILE STOPPED FOR A FEW MINUTES IN TRAFFIC: WOULD NEVER DOZE
HOW LIKELY ARE YOU TO NOD OFF OR FALL ASLEEP WHEN YOU ARE A PASSENGER IN A CAR FOR AN HOUR WITHOUT A BREAK: HIGH CHANCE OF DOZING
HOW LIKELY ARE YOU TO NOD OFF OR FALL ASLEEP WHILE SITTING AND TALKING TO SOMEONE: WOULD NEVER DOZE
HOW LIKELY ARE YOU TO NOD OFF OR FALL ASLEEP WHILE LYING DOWN TO REST IN THE AFTERNOON WHEN CIRCUMSTANCES PERMIT: HIGH CHANCE OF DOZING
HOW LIKELY ARE YOU TO NOD OFF OR FALL ASLEEP WHILE SITTING INACTIVE IN A PUBLIC PLACE: SLIGHT CHANCE OF DOZING

## 2023-10-13 ASSESSMENT — PATIENT HEALTH QUESTIONNAIRE - PHQ9
SUM OF ALL RESPONSES TO PHQ QUESTIONS 1-9: 20
SUM OF ALL RESPONSES TO PHQ QUESTIONS 1-9: 20
10. IF YOU CHECKED OFF ANY PROBLEMS, HOW DIFFICULT HAVE THESE PROBLEMS MADE IT FOR YOU TO DO YOUR WORK, TAKE CARE OF THINGS AT HOME, OR GET ALONG WITH OTHER PEOPLE: VERY DIFFICULT
SUM OF ALL RESPONSES TO PHQ QUESTIONS 1-9: 20

## 2023-10-13 ASSESSMENT — PAIN SCALES - GENERAL: PAINLEVEL: NO PAIN (0)

## 2023-10-13 NOTE — PROGRESS NOTES
[facility]  [address]  [place]  [facility] [address]  Phone: [phone]  Fax: [phone]  Primary Provider: Kelly Santoyo  Pre-op Performing Provider: KELLY SANTOYO      PREOPERATIVE EVALUATION:  Today's date: 10/13/2023    Diane is a 38 year old female who presents for a preoperative evaluation.      10/13/2023     1:41 PM   Additional Questions   Roomed by Danelle       Surgical Information:  Surgery/Procedure: POSTERIOR VAGINAL REPAIR PARAVAGINAL REPAIR POSSIBLE PUBOVAGINAL SLING   Surgery Location: [facility]  Surgeon: Dr. Ramana Stringer  Surgery Date: 10/27/23  Time of Surgery: 12pm  Where patient plans to recover: At home with family  Fax number for surgical facility: Note does not need to be faxed, will be available electronically in Epic.    Assessment & Plan     The proposed surgical procedure is considered INTERMEDIATE risk.    Preop general physical exam  -medically optimized for surgery at this time. She will proceed with surgery as deemed medically necessary and appropriate by anesthesia and Ob. She will hold her ASA for 7 days prior to surgery and resume after surgery. She will hold her Adderall and Spironolactone as well the day of surgery.   - CBC with platelets  - Basic metabolic panel  - CBC with platelets  - Basic metabolic panel    MONTY (stress urinary incontinence, female)  -Per above    Dissection of vertebral artery (H24)  -Now 3 years remote from her dissection. Will hold ASA for one week prior to surgery and will resume following surgery.     Hot flashes  -Updating labs as listed, certainly could be kat-menopause. They are normal at this time, will watch for now.   - Luteinizing Hormone  - Follicle stimulating hormone  - TSH with free T4 reflex  - Luteinizing Hormone  - Follicle stimulating hormone  - TSH with free T4 reflex    Essential hypertension  -BP under great control with metoprolol,  continue on current medications.     Immunization due  - HEPATITIS B, ADULT 20+ (ENGERIX-B/RECOMBIVAX HB)       - No identified additional risk factors other than previously addressed    Antiplatelet or Anticoagulation Medication Instructions:   - aspirin: Discontinue aspirin 7-10 days prior to procedure to reduce bleeding risk. It should be resumed postoperatively.     Additional Medication Instructions:   - Diuretics: HOLD on the day of surgery.   - Psychostimulants: Hold the day of surgery    RECOMMENDATION:  APPROVAL GIVEN to proceed with proposed procedure, without further diagnostic evaluation.            Subjective       HPI related to upcoming procedure:     She is having this done for urinary incontinence. She does not have good pelvic floor function either. She does have a small prolapse as well that he is going to fix as well.     She is in a good place with her mental health, most of her medications are for this.     She does have a history of vertebral artery dissection as well. This was May of 2020.     Her tingling is doing well with her gabapentin as well as carpal tunnel surgery.     She is still having random hot flashes. Is wondering if this is a sign of kat-menopause.         10/13/2023     1:37 PM   Preop Questions   1. Have you ever had a heart attack or stroke? No   2. Have you ever had surgery on your heart or blood vessels, such as a stent placement, a coronary artery bypass, or surgery on an artery in your head, neck, heart, or legs? YES - previous vertebral artery dissection, no intervention though   3. Do you have chest pain with activity? No   4. Do you have a history of  heart failure? No   5. Do you currently have a cold, bronchitis or symptoms of other infection? No   6. Do you have a cough, shortness of breath, or wheezing? No   7. Do you or anyone in your family have previous history of blood clots? No   8. Do you or does anyone in your family have a serious bleeding problem such  as prolonged bleeding following surgeries or cuts? No   9. Have you ever had problems with anemia or been told to take iron pills? YES - nothing recent, this is remote   10. Have you had any abnormal blood loss such as black, tarry or bloody stools, or abnormal vaginal bleeding? No   11. Have you ever had a blood transfusion? No   12. Are you willing to have a blood transfusion if it is medically needed before, during, or after your surgery? Yes   13. Have you or any of your relatives ever had problems with anesthesia? No   14. Do you have sleep apnea, excessive snoring or daytime drowsiness? YES - does use cpap   14a. Do you have a CPAP machine? Yes   15. Do you have any artifical heart valves or other implanted medical devices like a pacemaker, defibrillator, or continuous glucose monitor? No   16. Do you have artificial joints? No   17. Are you allergic to latex? No   18. Is there any chance that you may be pregnant? No       Health Care Directive:  Patient does not have a Health Care Directive or Living Will: Patient states has Advance Directive and will bring in a copy to clinic.    Preoperative Review of :   reviewed - controlled substances reflected in medication list.        Review of Systems  Constitutional, neuro, ENT, endocrine, pulmonary, cardiac, gastrointestinal, genitourinary, musculoskeletal, integument and psychiatric systems are negative, except as otherwise noted.    Patient Active Problem List    Diagnosis Date Noted    Vitamin D deficiency 01/21/2022     Priority: Medium     Formatting of this note might be different from the original.  Created by Conversion    Replacement Utility updated for latest IMO load      Obstructive sleep apnea 01/21/2022     Priority: Medium    Insomnia, unspecified type 01/21/2022     Priority: Medium    Parasomnia, unspecified type 01/21/2022     Priority: Medium    Hypersomnia 01/21/2022     Priority: Medium    Class 1 obesity with serious comorbidity and body  mass index (BMI) of 34.0 to 34.9 in adult 01/21/2022     Priority: Medium    Mild major depression (H24) 06/04/2021     Priority: Medium    Anxiety state 04/06/2020     Priority: Medium     Formatting of this note might be different from the original.  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    Fatty liver     hx of transaminase elevations 2020.    GERD (gastroesophageal reflux disease)     controlled w/ Pepcid    History of blood transfusion reaction     has antibodies.    History of transfusion     IBS (irritable bowel syndrome)     Non-specified     Left knee pain     hx of patellar ligament repair (cadaver)    Mental disorder     history of anxiety    Mild major depression (H24) 06/04/2021    AZALEA (obstructive sleep apnea)     \    Pregnancy-induced hypertension in third trimester 03/27/2018    Vertebral artery dissection (H24) 02/2020    left, on lifelong baby ASA 81mg now.     Past Surgical History:   Procedure Laterality Date    CARPAL TUNNEL RELEASE RT/LT Left 06/30/2023    CARPAL TUNNEL RELEASE RT/LT Right 06/05/2023    CHOLECYSTECTOMY      COLONOSCOPY      HC KNEE SCOPE, DIAGNOSTIC      Description: Arthroscopy Knee Left;  Recorded: 12/14/2012;  Comments: x3; ; 1. 2005 Initial ligament repair; ; 2. 2007 LIgament tightening; ; 3. 2010 LIgament thightening, floating cart removal and excess bone removal.    UPPER GASTROINTESTINAL ENDOSCOPY      ZZC LAP,CHOLECYSTECTOMY/EXPLORE      Description: Cholecystectomy Laparoscopic;  Proc Date: 06/16/2012;  Comments:  postpartum     Current Outpatient Medications   Medication Sig Dispense Refill    amphetamine-dextroamphetamine (ADDERALL XR) 10 MG 24 hr capsule TAKE 1 CAPSULE BY MOUTH EVERY DAY FOR ADHD      aspirin (ASA) 81 MG chewable tablet Take 81 mg by mouth      buPROPion (WELLBUTRIN XL) 150 MG 24 hr tablet TAKE 3 TABLETS BY MOUTH EVERY DAY      busPIRone (BUSPAR) 15 MG tablet       cholecalciferol 50 MCG (2000 UT) CAPS Take 1 tablet by mouth daily      gabapentin (NEURONTIN) 300 MG capsule       metoprolol succinate ER (TOPROL XL) 100 MG 24 hr tablet Take 1 tablet (100 mg) by mouth daily 90 tablet 1    minoxidil (LONITEN) 2.5 MG tablet TAKE ONE TABLET DAILY FOR HAIR LOSS      norethindrone (INCASSIA) 0.35 MG tablet Take 1 tablet (0.35 mg) by mouth daily 84 tablet 1    spironolactone (ALDACTONE) 50 MG tablet TAKE 1 TABLET DAILY IN THE MORNING WITH FULL GLASS OF WATER FOR HAIRLOSS      venlafaxine (EFFEXOR-XR) 75 MG 24 hr capsule Take 225 mg by mouth      traZODone (DESYREL) 50 MG tablet  (Patient not taking: Reported on 10/13/2023)         Allergies   Allergen Reactions    Oxycodone Nausea     Weakness, seen in ER for this    Percocet [Oxycodone-Acetaminophen] Nausea and Vomiting    Blood-Group Specific Substance Unknown     History of anti-E and anti-Cw.  Expect delays in obtaining blood for transfusion.         Social History     Tobacco Use    Smoking status: Never    Smokeless tobacco: Never   Substance Use Topics    Alcohol use: Yes     Alcohol/week: 0.0 - 1.0 standard drinks of alcohol     Family History   Problem Relation Age of Onset    Varicose Veins Mother     Obesity Mother     Sleep Apnea Mother     Heart Disease Father     Diabetes Father     Hypertension Father     Sleep Apnea Father     Asthma Sister     Cancer Maternal Grandmother     Cerebrovascular Disease Paternal Grandmother     Diabetes Paternal Grandfather     CABG Paternal Grandfather     Leukemia Maternal Aunt     Diabetes Paternal Uncle     Cancer  "Cousin     Diabetes Type 2  Cousin     CABG Maternal Great-Grandfather     Heart Failure Maternal Great-Grandfather      History   Drug Use No         Objective     /62   Pulse 87   Temp 98.4  F (36.9  C)   Ht 1.727 m (5' 8\")   Wt 99.3 kg (219 lb)   LMP 09/29/2023   SpO2 99%   BMI 33.30 kg/m      Physical Exam    GENERAL APPEARANCE: healthy, alert and no distress     EYES: EOMI, PERRL     HENT: ear canals and TM's normal and nose and mouth without ulcers or lesions     NECK: no adenopathy, no asymmetry, masses, or scars and thyroid normal to palpation     RESP: lungs clear to auscultation - no rales, rhonchi or wheezes     CV: regular rates and rhythm, normal S1 S2, no S3 or S4 and no murmur, click or rub     ABDOMEN:  soft, nontender, no HSM or masses and bowel sounds normal     MS: extremities normal- no gross deformities noted, no evidence of inflammation in joints, FROM in all extremities.     SKIN: no suspicious lesions or rashes     NEURO: Normal strength and tone, sensory exam grossly normal, mentation intact and speech normal     PSYCH: mentation appears normal. and affect normal/bright     LYMPHATICS: No cervical adenopathy    Recent Labs   Lab Test 08/09/22  1501 05/27/22  1327 01/27/22  1359   HGB  --  14.6 14.0   PLT  --  224 240     --   --    POTASSIUM 4.6  --   --    CR 0.80  --   --    A1C  --  5.2  --         Diagnostics:  Recent Results (from the past 168 hour(s))   Luteinizing Hormone    Collection Time: 10/13/23  2:48 PM   Result Value Ref Range    Luteinizing Hormone 17.6 mIU/mL   Follicle stimulating hormone    Collection Time: 10/13/23  2:48 PM   Result Value Ref Range    FSH 9.8 mIU/mL   TSH with free T4 reflex    Collection Time: 10/13/23  2:48 PM   Result Value Ref Range    TSH 1.88 0.30 - 4.20 uIU/mL   CBC with platelets    Collection Time: 10/13/23  2:48 PM   Result Value Ref Range    WBC Count 8.4 4.0 - 11.0 10e3/uL    RBC Count 4.74 3.80 - 5.20 10e6/uL    Hemoglobin " 14.3 11.7 - 15.7 g/dL    Hematocrit 43.1 35.0 - 47.0 %    MCV 91 78 - 100 fL    MCH 30.2 26.5 - 33.0 pg    MCHC 33.2 31.5 - 36.5 g/dL    RDW 12.2 10.0 - 15.0 %    Platelet Count 249 150 - 450 10e3/uL   Basic metabolic panel    Collection Time: 10/13/23  2:48 PM   Result Value Ref Range    Sodium 139 135 - 145 mmol/L    Potassium 4.7 3.4 - 5.3 mmol/L    Chloride 103 98 - 107 mmol/L    Carbon Dioxide (CO2) 27 22 - 29 mmol/L    Anion Gap 9 7 - 15 mmol/L    Urea Nitrogen 13.2 6.0 - 20.0 mg/dL    Creatinine 0.78 0.51 - 0.95 mg/dL    GFR Estimate >90 >60 mL/min/1.73m2    Calcium 9.9 8.6 - 10.0 mg/dL    Glucose 84 70 - 99 mg/dL      No EKG required, no history of coronary heart disease, significant arrhythmia, peripheral arterial disease or other structural heart disease.    Revised Cardiac Risk Index (RCRI):  The patient has the following serious cardiovascular risks for perioperative complications:   - Cerebrovascular Disease (TIA or CVA) = 1 point     RCRI Interpretation: 1 point: Class II (low risk - 0.9% complication rate)         Signed Electronically by: Isabel Jefferson MD  Copy of this evaluation report is provided to requesting physician.      Answers submitted by the patient for this visit:  Patient Health Questionnaire (Submitted on 10/13/2023)  If you checked off any problems, how difficult have these problems made it for you to do your work, take care of things at home, or get along with other people?: Very difficult  PHQ9 TOTAL SCORE: 20

## 2023-10-13 NOTE — PATIENT INSTRUCTIONS
Preparing for Your Surgery  Getting started  A nurse will call you to review your health history and instructions. They will give you an arrival time based on your scheduled surgery time. Please be ready to share:  Your doctor's clinic name and phone number  Your medical, surgical, and anesthesia history  A list of allergies and sensitivities  A list of medicines, including herbal treatments and over-the-counter drugs  Whether the patient has a legal guardian (ask how to send us the papers in advance)  Please tell us if you're pregnant--or if there's any chance you might be pregnant. Some surgeries may injure a fetus (unborn baby), so they require a pregnancy test. Surgeries that are safe for a fetus don't always need a test, and you can choose whether to have one.   If you have a child who's having surgery, please ask for a copy of Preparing for Your Child's Surgery.    Preparing for surgery  Within 10 to 30 days of surgery: Have a pre-op exam (sometimes called an H&P, or History and Physical). This can be done at a clinic or pre-operative center.  If you're having a , you may not need this exam. Talk to your care team.  At your pre-op exam, talk to your care team about all medicines you take. If you need to stop any medicines before surgery, ask when to start taking them again.  We do this for your safety. Many medicines can make you bleed too much during surgery. Some change how well surgery (anesthesia) drugs work.  Call your insurance company to let them know you're having surgery. (If you don't have insurance, call [phone].)  Call your clinic if there's any change in your health. This includes signs of a cold or flu (sore throat, runny nose, cough, rash, fever). It also includes a scrape or scratch near the surgery site.  If you have questions on the day of surgery, call your hospital or surgery center.  Eating and drinking guidelines  For your safety: Unless your surgeon tells you otherwise,  follow the guidelines below.  Eat and drink as usual until 8 hours before you arrive for surgery. After that, no food or milk.  Drink clear liquids until 2 hours before you arrive. These are liquids you can see through, like water, Gatorade, and Propel Water. They also include plain black coffee and tea (no cream or milk), candy, and breath mints. You can spit out gum when you arrive.  If you drink alcohol: Stop drinking it the night before surgery.  If your care team tells you to take medicine on the morning of surgery, it's okay to take it with a sip of water.  Preventing infection  Shower or bathe the night before and morning of your surgery. Follow the instructions your clinic gave you. (If no instructions, use regular soap.)  Don't shave or clip hair near your surgery site. We'll remove the hair if needed.  Don't smoke or vape the morning of surgery. You may chew nicotine gum up to 2 hours before surgery. A nicotine patch is okay.  Note: Some surgeries require you to completely quit smoking and nicotine. Check with your surgeon.  Your care team will make every effort to keep you safe from infection. We will:  Clean our hands often with soap and water (or an alcohol-based hand rub).  Clean the skin at your surgery site with a special soap that kills germs.  Give you a special gown to keep you warm. (Cold raises the risk of infection.)  Wear special hair covers, masks, gowns and gloves during surgery.  Give antibiotic medicine, if prescribed. Not all surgeries need antibiotics.  What to bring on the day of surgery  Photo ID and insurance card  Copy of your health care directive, if you have one  Glasses and hearing aids (bring cases)  You can't wear contacts during surgery  Inhaler and eye drops, if you use them (tell us about these when you arrive)  CPAP machine or breathing device, if you use them  A few personal items, if spending the night  If you have . . .  A pacemaker, ICD (cardiac defibrillator) or other  implant: Bring the ID card.  An implanted stimulator: Bring the remote control.  A legal guardian: Bring a copy of the certified (court-stamped) guardianship papers.  Please remove any jewelry, including body piercings. Leave jewelry and other valuables at home.  If you're going home the day of surgery  You must have a responsible adult drive you home. They should stay with you overnight as well.  If you don't have someone to stay with you, and you aren't safe to go home alone, we may keep you overnight. Insurance often won't pay for this.  After surgery  If it's hard to control your pain or you need more pain medicine, please call your surgeon's office.  Questions?   If you have any questions for your care team, list them here: _________________________________________________________________________________________________________________________________________________________________________ ____________________________________ ____________________________________ ____________________________________  For informational purposes only. Not to replace the advice of your health care provider. Copyright   2003, 2019 [employer] [facility]. All rights reserved. Clinically reviewed by Elvira Hou MD. [employer] 431712 - REV 12/22.    How to Take Your Medication Before Surgery  - Take all of your medications before surgery except as noted below  - HOLD (do not take) Aspirin for 7 days  - STOP taking all vitamins and herbal supplements 14 days before surgery.  - Please hold your Adderall and the spironolactone the day of your surgery, you can resume this after your surgery if you feel like you need it.

## 2023-10-13 NOTE — PATIENT INSTRUCTIONS
It might be worthwhile looking at other medications   given that that sleep talking and kicking started with venlafaxine.    Set an alarm at night to remind you to put CPAP on when to go to bed.    Try anti-inflammatory nasal spray such as nasal fluticasone 2 sprays each nostril nightly for a couple of months to see if that helps with nasal breathing    Talk to your psychiatrist about your medications.  Maybe there is an alternative that is less likely to cause the sleep talking and movement activity at night.    For general sleep health questions:   https://www.thensf.org/sleep-health-topics/  http://sleepeducation.org    Medicare and Medicaid patients starting on CPAP or biPAP on or after 5/12/2023  Medicare patients should schedule an IN PERSON CLINIC appointment to provide your CPAP/biPAP usage data to a provider within 90 days of starting CPAP    For new ResMed devices, sign up for device david to monitor your device for your followup visits  We encourage you to utilize the [employer] david or website (Crovat) to monitor your therapy progress and share the data with your healthcare team when you discuss your sleep apnea.                                                      Know your equipment:  CPAP is continuous positive airway pressure that prevents obstructive sleep apnea by keeping the throat from collapsing while you are sleeping. In most cases, the device is  smart  and can slowly self-adjusts if your throat collapses and keeps a record every day of how well you are treated-this information is available to you and your care team.  BPAP is bilevel positive airway pressure that keeps your throat open and also assists each breath with a pressure boost to maintain adequate breathing.  Special kinds of BPAP are used in patients who have inadequate breathing from lung or heart disease. In most cases, the device is  smart  and can slowly self-adjusts to assist breathing. Like CPAP, the device  keeps a record of how well you are treated.  Your mask is your connection to the device. You get to choose what feels most comfortable and the staff will help to make sure if fits. Here: are some examples of the different masks that are available:          Continue PAP therapy every night, for all hours that you are sleeping (including naps.)  As always, try to get at least 8 hours of sleep or more each day, keep a regular sleep schedule, and avoid sleep deprivation. Avoid alcohol.  Reasons that you might need a change to your pressure therapy would be weight gain or loss, waking having inadvertently removed your PAP overnight, having previously felt refreshed by sleep with CPAP use and now waking un-refreshed, and return of daytime sleepiness. Also, the development of new medical problems  (such as heart failure, stroke, medications such as narcotics) can sometimes affect breathing at night and change your PAP therapy needs.  Please bring PAP with you if you are hospitalized.  If anticipating surgery be sure to discuss with your surgeon that you have sleep apnea and use PAP therapy.    Maintain your equipment as recommended which includes routine cleaning and replacement of supplies.      Call DME for any questions regarding supplies or maintenance.    [facility], [facility] [phone]    Do not drive on engage in potentially dangerous activities if feeling sleepy.    Please follow up in sleep clinic again in 12 months.        Tips for your PAP use-    Mask fitting tips  Mask fitting exercise:    To improve your mask seal and your mobility at night, put mask on and secure in place.  Lie down in bed with full pressure and roll to one side, adjust headgear while in that position to eliminate any leaks. Repeat process rolling to other side.     The mask seal does not have to be perfect:   CPAP machines are designed to make up for small leaks. However, you will not  tolerate leaks blowing in your eyes so you will need to adjust.   Any leak should only be near or at the bottom of the mask.  We expect your mask to leak slightly at night.    Do not over-tighten the headgear straps, tighter IS NOT better, we expect minimal leak.    First try re-positioning the mask or headgear before tightening the headgear straps.  Mask leaks are expected due to changing sleeping positions. Try pulling the mask away from your skin allowing the cushion to re-inflate will minimize the leak.  If you struggle for a good fit, try turning the CPAP off and then readjust the mask by pulling it away from your face and then turning back on the CPAP.        Humidifier tips  Humidifiers can be adjusted to increase or decrease the amount of moisture according to your comfort level. You may need to adjust this frequently at first, but then might only change it with seasonal weather changes.     Try INCREASING the humidity if:  You experience a dry, irritated nasal passage or throat.  You have a runny, drippy nose or sneezing fits after using CPAP.  You experience nasal congestion during or after CPAP use.    Try DECREASING the humidity if:  You have excessive condensation or  rain out  in the tubing or mask.  Otherwise keep the tubing warm during the night by running it underneath the blankets or pillow.      Clinic visit after initial PAP set-up   Bring your equipment with you to your 5-8 week follow up clinic visit.  We will be extracting your data from the machine if not available from the cloud based modem.        Travel  Always take your equipment with you when you travel.  If you fly with your equipment bring it on with you as a carry on.  Medical equipment does not count as a carry on.    If you travel international the machines take 110-240v.  The only adapter needed is the adapter that will fit into the receptacle (outlet).    You may also want to bring an extension cord as many hotel rooms have  limited outlets at the bedside.  Do not travel with water in your humidifier chamber.     Cleaning and Maintenance Guidelines    Equipment Frequency Cleaning Method   Mask First Day    Daily      Weekly Soak mask in hot soapy water for 30 minutes, rinse and air dry.  Wipe nasal cushion with a hot soapy (Ivory, baby shampoo) cloth and rinse.  Baby wipes may also be used.  Do not use anti-bacterial soaps,Kristal  liquid soap, rubbing alcohol, bleach or ammonia.  Wash frame in hot soapy water (Ivory, baby shampoo) rinse and let air dry   Headgear Biweekly Wash in hot soapy water, rinse and air dry   Reusable Gray Filter Weekly Wash in hot soapy water, rinse, put in towel squeeze moisture out, let air dry   Disposable White Filter Check Weekly Replace when brown or gray in color; at least every 2 to 3 months   Humidifier Chamber Daily    Weekly Empty distilled water from humidifier and let air dry    Hand wash in hot soapy water, rinse and air dry   Tubing Weekly Wash in hot soapy water, rinse and let air dry   Mask, Tubing and Humidifier Chamber As needed Disinfect: Soak in 1 part distilled white vinegar to 3 parts hot water for 30 minutes, rinse well and air dry  Not the material headgear        MASK AND SUPPLY REORDERING and EQUIPMENT NEEDS through your DME and per your insurance  Reminder: Most insurance companies will allow for a new mask, headgear, tubing, and reusable gray filter every six months.  Disposable white ultra-fine filters are covered monthly.      HOME AND SAFETY INSTRUCTIONS  Do not use frayed or cracked electrical cords, multi plug adaptors, or switched receptacles  Do not immerse electrical equipment into water  Assure that electrical cords do not become a tripping hazard         Insomnia and Behavioral Sleep Medicine Program    The [facility] Insomnia and Behavioral Sleep Medicine Program provides non-drug treatment for sleep problems including:    Cognitive-behavioral Therapies for Insomnia  (CBT-I)  Management of Shift-work and Jet Lag  Management of Delayed, Advanced and Irregular Circadian Rhythm Sleep Disorders  Imagery Rehearsal Therapy (IRT) for Nightmare Disorder  PAP Therapy Desensitization    You have been referred for consultation with a sleep psychologist who specializes in behavioral sleep medicine and treatment of insomnia.  The [facility] Insomnia and Behavioral Sleep Medicine Program offers individualized telehealth services through our [facility] and online CBT-I.    Preparing for your Consultation    You will need to keep a Sleep Diary for at least a week prior to your visit. Complete the sleep diary each day first thing after you get up by answering a few key questions about your sleep using our convenient mobile david or paper sleep diary.  Your answers should be based on your recall of the past 24 hours.  Avoid watching the clock or recording data during the night.     Insomnia  David    The Insomnia  mobile david  is a convenient way to keep track of your sleep prior to your sleep consultation.  Simply download the free david on your Apple or Android phone and record your information each morning.  The david includes training, self-assessment, and sleep schedule recommendations.  Prior to your consultation we recommend you use only the sleep diary function. You can e-mail yourself a copy of your sleep diary data by going to the Settings section and using the [place] User Data function.  During your consultation your provider will review the data with you.          [facility] Sleep Diary    You can also track your sleep using the [facility] paper sleep diary.  You can upload your sleep diary and send it via a [employer] message, fax it to [phone], or have it with you at the time of your consultation.            CBT-I:  Frequently Asked Questions    What is CBT-I?    Cognitive Behavioral Therapy for Insomnia, also known as CBT-I, is a highly  effective non-drug treatment for insomnia. The American College of Physicians recommends CBT-I as the first treatment for chronic insomnia.  Research has shown CBT-I to be safer and more effective long term than sleeping pills.    What does CBT-I involve?     CBT-I targets behaviors that lead to chronic insomnia:  Habits that weaken the bed as a cue for sleep  Habits that weaken your body's sleep drive and sleep/wake clock   Unhelpful sleep thoughts that increase sleep-related worry and arousal.    The process involves 3-6 telehealth visits that guide you to implement proven strategies to get a better night's sleep.    People often see improvement in their sleep within a few weeks. Research shows if you keep practicing the skills you learn your sleep is likely to continue to improve 6-12 months after treatment.    Does this program prescribe or manage sleep medication?    No.  Your prescribing provider is responsible to assist you in managing your sleep medications.  Some people choose to stop using sleep medication prior to or during CBT-I.  Our program can work with your prescribing provider to help reduce or eliminate use of sleep medications.     Getting Started Today!    If you haven't already done so, we recommend you consider making the following changes to your sleep habits prior to your sleep consultation:     Reduce your consumption of caffeine and alcohol.  Both can disrupt sleep and make strengthening your sleep more difficult.  Specifically:    - Avoid caffeine within 6 hours of bedtime   - No more than 3 caffeinated beverages per day (e.g. 8 oz. cup coffee or 12 oz. cup soda)            - No alcohol within 3 hours of bedtime    Make sure your bedroom is quiet, comfortable and dark.  Noise, light and an uncomfortable sleep space can harm your sleep.      Keep the same sleep schedule 7 days a week.unless you do shift work.      Online CBT-I     If you want to get started today, research indicates that  online CBT-I can be effective for some individuals. These programs requires comfort with david-based or online learning.  However, digital CBT-I programs are not for everyone.  Contraindications include:    Seizure disorders,   Bipolar disorder,   Unstable medical or mental health conditions,   Frailty or risk of falling  Pregnancy    You should consult a sleep specialist before using these resources if you have:    Sleep Apnea  Restless Leg Syndrome  Sleep Walking  REM behavior disorder  Night Terrors  Excessive Daytime Sleepiness  Are engaged in shift work  Use prescription sleep medication    Our Online CBT-I program    If your sleep provider recommends online CBT-I for you , the cost for an entire 6-week program is $40.    To get started, copy and paste the link below which will take you to the landing page to register:                           [email or web address]               If you wish to complete the online CBT-I program but do not plan to follow-up with a sleep provider, you are set to begin the program.    If you are planning to work with an [facility] sleep provider, there are a couple of extra steps you can take to share your sleep data with your sleep provider.  To share sleep log data, go to the left side navigation and click on the  share sleep log  button:         You will be taken to the page below where you will enter  the provider code [employer] into the box.          Once you press the locate button, the information for [facility] will pop up as below.  By pressing the Submit button your data will be sent to our  secure [facility] sleep program portal and is available for review by your provider. You will only need to do this step once.                                  Self-help Workbooks for Insomnia    If you have found self-help books useful in the past, you may want to consider reading one of the following books prior to your consultation:    Say Ilan to Insomnia:  The Six-Week, Drug-Free Program Developed at [facility].  Matias Sellers MD. Available in paperback, Leif, and audiobook.    Overcoming Insomnia: A Cognitive-Behavioral Therapy Approach, Workbook.  Aquilino Weber, PhD  and Nell Dover, PhD.  Available in paperback and Leif.    Quiet Your Mind and Get to Sleep: Solutions to Insomnia for Those with Depression, Anxiety, or Chronic Pain.  Arlyn Mallory, PhD and Nell Dover, PhD.  Available in paperback and Leif

## 2023-10-13 NOTE — PROGRESS NOTES
Virtual Visit Details    Type of service:  Video Visit     Originating Location (pt. Location): Home    Distant Location (provider location):  Off-site  Platform used for Video Visit: Fer    Chief complaint:Follow-up sleep apnea and insomnia    LOV 1/21/2022    History of Present Illness: 38-year-old female with history of mild obstructive sleep apnea diagnosed in 2014, insomnia, daytime sleepiness mood disorders.  At her last visit she was strongly encouraged to get more regular with CPAP use.  She also had recently developed increased movement activity at night and sleep talking associated with venlafaxine.  She is reports today that she continues to talk as well as kick at night.  She remains on venlafaxine.  She recently started Adderall 10 mg in the mornings.  She is not sure if it is doing anything.  She thinks her usage has improved since her last visit.  She thinks she is using it 5 out of 7 nights.  But she does not always use it the whole night.  No download is available today.  Her sleep is often disrupted at night by her kids ages 11, 8 and 5.  Bedtimes vary between 10 and midnight.  She will take occasionally take a trazodone to help her fall asleep if she knows is going to be a rough night.  But she maybe takes that once a month because it can cause grogginess during the day.  She is usually up between 5 AM and 7:30 AM with the kids.  She does not think she could sleep much longer than that may be 8 AM without the kids.  She will take naps 2-3 times a week for up to an hour in length.  She states that they definitely help with mood.  Weight has been stable since her last visit.  She is interested in trying a nasal pillow style CPAP mask.  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    Fatty liver     hx of transaminase elevations 2020.    GERD (gastroesophageal reflux disease)     controlled w/ Pepcid    History of blood transfusion reaction     has antibodies.    History of transfusion     IBS (irritable bowel syndrome)     Non-specified     Left knee pain     hx of patellar ligament repair (cadaver)    Mental disorder     history of anxiety    Mild major depression (H24) 06/04/2021    AZALEA (obstructive sleep apnea)     \    Pregnancy-induced hypertension in third trimester 03/27/2018    Vertebral artery dissection (H24) 02/2020    left, on lifelong baby ASA 81mg now.       Allergies   Allergen Reactions    Oxycodone Nausea     Weakness, seen in ER for this    Percocet [Oxycodone-Acetaminophen] Nausea and Vomiting    Blood-Group Specific Substance Unknown     History of anti-E and anti-Cw.  Expect delays in obtaining blood for transfusion.        Current Outpatient Medications   Medication    amphetamine-dextroamphetamine (ADDERALL XR) 10 MG 24 hr capsule    aspirin (ASA) 81 MG chewable tablet    buPROPion (WELLBUTRIN XL) 150 MG 24 hr tablet    busPIRone (BUSPAR) 15 MG tablet    cholecalciferol 50 MCG (2000 UT) CAPS    gabapentin (NEURONTIN) 300 MG capsule    metoprolol succinate ER (TOPROL XL) 100 MG 24 hr tablet    norethindrone (INCASSIA) 0.35 MG tablet    Semaglutide-Weight Management (WEGOVY) 0.25 MG/0.5ML pen    Semaglutide-Weight Management (WEGOVY) 0.5 MG/0.5ML pen    traZODone (DESYREL) 50 MG tablet    venlafaxine (EFFEXOR-XR) 75 MG 24 hr capsule     No current facility-administered medications for this visit.       Social History     Socioeconomic History    Marital status:      Spouse name: Not on file  "   Number of children: Not on file    Years of education: Not on file    Highest education level: Not on file   Occupational History    Not on file   Tobacco Use    Smoking status: Never    Smokeless tobacco: Never   Vaping Use    Vaping Use: Never used   Substance and Sexual Activity    Alcohol use: Yes     Alcohol/week: 0.0 - 1.0 standard drinks of alcohol    Drug use: No    Sexual activity: Yes     Partners: Male     Birth control/protection: Pill   Other Topics Concern    Not on file   Social History Narrative    Not on file     Social Determinants of Health     Financial Resource Strain: Not on file   Food Insecurity: Not on file   Transportation Needs: Not on file   Physical Activity: Not on file   Stress: Not on file   Social Connections: Not on file   Interpersonal Safety: Not on file   Housing Stability: Not on file       Family History   Problem Relation Age of Onset    Varicose Veins Mother     Obesity Mother     Sleep Apnea Mother     Heart Disease Father     Diabetes Father     Hypertension Father     Sleep Apnea Father     Asthma Sister     Cancer Maternal Grandmother     Cerebrovascular Disease Paternal Grandmother     Diabetes Paternal Grandfather     CABG Paternal Grandfather     Leukemia Maternal Aunt     Diabetes Paternal Uncle     Cancer Cousin     Diabetes Type 2  Cousin     CABG Maternal Great-Grandfather     Heart Failure Maternal Great-Grandfather            EXAM:  Ht 1.727 m (5' 8\")   Wt 101.2 kg (223 lb)   BMI 33.91 kg/m    GENERAL: Alert and no distress  EYES: Eyes grossly normal to inspection.  No dobvious scleral/conjunctival abnormalities.  RESP: No audible wheeze, cough, or visible cyanosis.  No visible retractions or increased work of breathing.    SKIN: Visible skin clear. No significant rash, abnormal pigmentation or lesions.  NEURO: Cranial nerves grossly intact.  Mentation and speech appropriate for age.  PSYCH: Mentation appears normal, affect normal, judgement and insight " intact, normal speech and appearance well-groomed.       [facility] 2/28/2014  Weight 232 lbs BMI 36.4  AHI 11.9, KENDAL 18.4, Lowest O2 SAT 85%    PAP download not available    TSH   Date Value Ref Range Status   08/09/2022 1.94 0.30 - 4.20 uIU/mL Final   01/27/2022 1.39 0.30 - 5.00 uIU/mL Final         ASSESSMENT:  38-year-old female with history of mood disorders, obesity, hypertension, insomnia, overall mild obstructive sleep apnea and daytime sleepiness.  She also has parasomnia likely related to venlafaxine.  Ongoing treatment of mild obstructive sleep apnea is medically indicated.  She did not complete cognitive behavioral therapy for insomnia after her last visit.  Some of her sleep disruption is associated with environmental factors that are so children.      PLAN:  Orders generated for updated CPAP supplies, per patient choice for mask style.  Urged her to set an alarm to remind her to put it on every night when she goes to bed.  Ideally use it all night every night.  Also recommended a trial of anti-inflammatory nasal spray to optimize nasal breathing.  Generated a referral for formal cognitive behavioral therapy for insomnia.  Have a discussion with her  to see if he can take over some childcare duties so she is less disrupted with her sleep.  Follow-up with psychiatry regarding medication management to see if alternatives could be suggested little less impact on sleep and fatigue and parasomnia.  See instructions for further details of counseling provided.      30 minutes spent by me on the date of the encounter doing chart review, history and exam, documentation and further activities per the note    Ricarda Fish M.D.  Pulmonary/Critical Care/Sleep Medicine    [facility]   Floor 1, [address]   [address] [place] [address]. [place] [address]   Appointments: [phone]    The above note was dictated using voice recognition software  and may include typographical errors. Please contact the author for any clarifications.

## 2023-10-13 NOTE — NURSING NOTE
PHQ9 = 20    Has patient had flu shot for current/most recent flu season? If so, when? Yes: 10/26/2022      Is the patient currently in the state of [place]? YES    Visit mode:VIDEO    If the visit is dropped, the patient can be reconnected by: VIDEO VISIT: Text to cell phone:   Telephone Information:   Mobile [phone]       Will anyone else be joining the visit? NO  (If patient encounters technical issues they should call [phone]    How would you like to obtain your AVS? MyChart    Are changes needed to the allergy or medication list? No    Reason for visit: RECHECK    Kailyn SHERWOOD

## 2023-10-14 LAB
ANION GAP SERPL CALCULATED.3IONS-SCNC: 9 MMOL/L (ref 7–15)
BUN SERPL-MCNC: 13.2 MG/DL (ref 6–20)
CALCIUM SERPL-MCNC: 9.9 MG/DL (ref 8.6–10)
CHLORIDE SERPL-SCNC: 103 MMOL/L (ref 98–107)
CREAT SERPL-MCNC: 0.78 MG/DL (ref 0.51–0.95)
DEPRECATED HCO3 PLAS-SCNC: 27 MMOL/L (ref 22–29)
EGFRCR SERPLBLD CKD-EPI 2021: >90 ML/MIN/1.73M2
FSH SERPL IRP2-ACNC: 9.8 MIU/ML
GLUCOSE SERPL-MCNC: 84 MG/DL (ref 70–99)
LH SERPL-ACNC: 17.6 MIU/ML
POTASSIUM SERPL-SCNC: 4.7 MMOL/L (ref 3.4–5.3)
SODIUM SERPL-SCNC: 139 MMOL/L (ref 135–145)
TSH SERPL DL<=0.005 MIU/L-ACNC: 1.88 UIU/ML (ref 0.3–4.2)

## 2023-10-16 NOTE — NURSING NOTE
Return in about 1 year reminder created and to be send via [employer].       Kristine Murillo ANTONIO  [facility]

## 2023-10-25 ENCOUNTER — ANESTHESIA EVENT (OUTPATIENT)
Dept: SURGERY | Facility: AMBULATORY SURGERY CENTER | Age: 38
End: 2023-10-25
Payer: COMMERCIAL

## 2023-10-26 ENCOUNTER — HOSPITAL ENCOUNTER (OUTPATIENT)
Facility: AMBULATORY SURGERY CENTER | Age: 38
Discharge: HOME | End: 2023-10-26
Attending: OBSTETRICS & GYNECOLOGY
Payer: COMMERCIAL

## 2023-10-26 ENCOUNTER — ANESTHESIA (OUTPATIENT)
Dept: SURGERY | Facility: AMBULATORY SURGERY CENTER | Age: 38
End: 2023-10-26
Payer: COMMERCIAL

## 2023-10-26 VITALS
SYSTOLIC BLOOD PRESSURE: 136 MMHG | WEIGHT: 223 LBS | HEART RATE: 73 BPM | DIASTOLIC BLOOD PRESSURE: 83 MMHG | HEIGHT: 68 IN | RESPIRATION RATE: 16 BRPM | OXYGEN SATURATION: 97 % | BODY MASS INDEX: 33.8 KG/M2 | TEMPERATURE: 98.1 F

## 2023-10-26 DIAGNOSIS — Z09 S/P GYNECOLOGICAL SURGERY, FOLLOW-UP EXAM: Primary | ICD-10-CM

## 2023-10-26 LAB
HCG UR QL: NEGATIVE
INTERNAL QC OK POCT: NORMAL
POCT KIT EXPIRATION DATE: NORMAL
POCT KIT LOT NUMBER: NORMAL

## 2023-10-26 DEVICE — SYSTEM, SLING, SINGLE INCISION, ALTIS 5196502400: Type: IMPLANTABLE DEVICE | Site: VAGINA | Status: FUNCTIONAL

## 2023-10-26 RX ORDER — ONDANSETRON 4 MG/1
4 TABLET, ORALLY DISINTEGRATING ORAL EVERY 30 MIN PRN
Status: DISCONTINUED | OUTPATIENT
Start: 2023-10-26 | End: 2023-10-27 | Stop reason: HOSPADM

## 2023-10-26 RX ORDER — GLYCOPYRROLATE 0.2 MG/ML
INJECTION, SOLUTION INTRAMUSCULAR; INTRAVENOUS PRN
Status: DISCONTINUED | OUTPATIENT
Start: 2023-10-26 | End: 2023-10-26

## 2023-10-26 RX ORDER — ACETAMINOPHEN 325 MG/1
975 TABLET ORAL ONCE
Status: COMPLETED | OUTPATIENT
Start: 2023-10-26 | End: 2023-10-26

## 2023-10-26 RX ORDER — CEFAZOLIN SODIUM 2 G/100ML
2 INJECTION, SOLUTION INTRAVENOUS SEE ADMIN INSTRUCTIONS
Status: DISCONTINUED | OUTPATIENT
Start: 2023-10-26 | End: 2023-10-27 | Stop reason: HOSPADM

## 2023-10-26 RX ORDER — ONDANSETRON 2 MG/ML
4 INJECTION INTRAMUSCULAR; INTRAVENOUS EVERY 30 MIN PRN
Status: DISCONTINUED | OUTPATIENT
Start: 2023-10-26 | End: 2023-10-27 | Stop reason: HOSPADM

## 2023-10-26 RX ORDER — HYDROMORPHONE HCL IN WATER/PF 6 MG/30 ML
0.4 PATIENT CONTROLLED ANALGESIA SYRINGE INTRAVENOUS EVERY 5 MIN PRN
Status: DISCONTINUED | OUTPATIENT
Start: 2023-10-26 | End: 2023-10-27 | Stop reason: HOSPADM

## 2023-10-26 RX ORDER — HYDROMORPHONE HCL IN WATER/PF 6 MG/30 ML
0.2 PATIENT CONTROLLED ANALGESIA SYRINGE INTRAVENOUS EVERY 5 MIN PRN
Status: DISCONTINUED | OUTPATIENT
Start: 2023-10-26 | End: 2023-10-27 | Stop reason: HOSPADM

## 2023-10-26 RX ORDER — SODIUM CHLORIDE, SODIUM LACTATE, POTASSIUM CHLORIDE, CALCIUM CHLORIDE 600; 310; 30; 20 MG/100ML; MG/100ML; MG/100ML; MG/100ML
INJECTION, SOLUTION INTRAVENOUS CONTINUOUS
Status: DISCONTINUED | OUTPATIENT
Start: 2023-10-26 | End: 2023-10-27 | Stop reason: HOSPADM

## 2023-10-26 RX ORDER — LIDOCAINE 40 MG/G
CREAM TOPICAL
Status: DISCONTINUED | OUTPATIENT
Start: 2023-10-26 | End: 2023-10-27 | Stop reason: HOSPADM

## 2023-10-26 RX ORDER — AMOXICILLIN 250 MG
1-2 CAPSULE ORAL 2 TIMES DAILY
Qty: 30 TABLET | Refills: 0 | Status: SHIPPED | OUTPATIENT
Start: 2023-10-26 | End: 2024-09-20

## 2023-10-26 RX ORDER — PROPOFOL 10 MG/ML
INJECTION, EMULSION INTRAVENOUS CONTINUOUS PRN
Status: DISCONTINUED | OUTPATIENT
Start: 2023-10-26 | End: 2023-10-26

## 2023-10-26 RX ORDER — ACETAMINOPHEN 325 MG/1
975 TABLET ORAL ONCE
Status: DISCONTINUED | OUTPATIENT
Start: 2023-10-26 | End: 2023-10-27 | Stop reason: HOSPADM

## 2023-10-26 RX ORDER — IBUPROFEN 800 MG/1
800 TABLET, FILM COATED ORAL ONCE
Status: DISCONTINUED | OUTPATIENT
Start: 2023-10-26 | End: 2023-10-27 | Stop reason: HOSPADM

## 2023-10-26 RX ORDER — FENTANYL CITRATE 50 UG/ML
INJECTION, SOLUTION INTRAMUSCULAR; INTRAVENOUS PRN
Status: DISCONTINUED | OUTPATIENT
Start: 2023-10-26 | End: 2023-10-26

## 2023-10-26 RX ORDER — CEFAZOLIN SODIUM 2 G/100ML
2 INJECTION, SOLUTION INTRAVENOUS
Status: COMPLETED | OUTPATIENT
Start: 2023-10-26 | End: 2023-10-26

## 2023-10-26 RX ORDER — FENTANYL CITRATE 0.05 MG/ML
50 INJECTION, SOLUTION INTRAMUSCULAR; INTRAVENOUS EVERY 5 MIN PRN
Status: DISCONTINUED | OUTPATIENT
Start: 2023-10-26 | End: 2023-10-27 | Stop reason: HOSPADM

## 2023-10-26 RX ORDER — NEOSTIGMINE METHYLSULFATE 1 MG/ML
VIAL (ML) INJECTION PRN
Status: DISCONTINUED | OUTPATIENT
Start: 2023-10-26 | End: 2023-10-26

## 2023-10-26 RX ORDER — LIDOCAINE HYDROCHLORIDE 20 MG/ML
INJECTION, SOLUTION INFILTRATION; PERINEURAL PRN
Status: DISCONTINUED | OUTPATIENT
Start: 2023-10-26 | End: 2023-10-26

## 2023-10-26 RX ORDER — OXYCODONE HYDROCHLORIDE 10 MG/1
10 TABLET ORAL
Status: DISCONTINUED | OUTPATIENT
Start: 2023-10-26 | End: 2023-10-27 | Stop reason: HOSPADM

## 2023-10-26 RX ORDER — FENTANYL CITRATE 0.05 MG/ML
25 INJECTION, SOLUTION INTRAMUSCULAR; INTRAVENOUS EVERY 5 MIN PRN
Status: DISCONTINUED | OUTPATIENT
Start: 2023-10-26 | End: 2023-10-27 | Stop reason: HOSPADM

## 2023-10-26 RX ORDER — PROPOFOL 10 MG/ML
INJECTION, EMULSION INTRAVENOUS PRN
Status: DISCONTINUED | OUTPATIENT
Start: 2023-10-26 | End: 2023-10-26

## 2023-10-26 RX ORDER — OXYCODONE HYDROCHLORIDE 5 MG/1
5 TABLET ORAL
Status: DISCONTINUED | OUTPATIENT
Start: 2023-10-26 | End: 2023-10-27 | Stop reason: HOSPADM

## 2023-10-26 RX ORDER — HYDROCODONE BITARTRATE AND ACETAMINOPHEN 5; 325 MG/1; MG/1
1-2 TABLET ORAL EVERY 4 HOURS PRN
Qty: 20 TABLET | Refills: 0 | Status: SHIPPED | OUTPATIENT
Start: 2023-10-26 | End: 2024-09-20

## 2023-10-26 RX ORDER — ONDANSETRON 4 MG/1
4 TABLET, ORALLY DISINTEGRATING ORAL EVERY 8 HOURS PRN
Qty: 4 TABLET | Refills: 0 | Status: SHIPPED | OUTPATIENT
Start: 2023-10-26 | End: 2024-09-20

## 2023-10-26 RX ORDER — ONDANSETRON 2 MG/ML
INJECTION INTRAMUSCULAR; INTRAVENOUS PRN
Status: DISCONTINUED | OUTPATIENT
Start: 2023-10-26 | End: 2023-10-26

## 2023-10-26 RX ORDER — IBUPROFEN 800 MG/1
800 TABLET, FILM COATED ORAL EVERY 6 HOURS PRN
Qty: 30 TABLET | Refills: 0 | Status: SHIPPED | OUTPATIENT
Start: 2023-10-26 | End: 2024-09-20

## 2023-10-26 RX ORDER — HYDROCODONE BITARTRATE AND ACETAMINOPHEN 5; 325 MG/1; MG/1
1 TABLET ORAL ONCE
Status: COMPLETED | OUTPATIENT
Start: 2023-10-26 | End: 2023-10-26

## 2023-10-26 RX ORDER — DEXAMETHASONE SODIUM PHOSPHATE 4 MG/ML
INJECTION, SOLUTION INTRA-ARTICULAR; INTRALESIONAL; INTRAMUSCULAR; INTRAVENOUS; SOFT TISSUE PRN
Status: DISCONTINUED | OUTPATIENT
Start: 2023-10-26 | End: 2023-10-26

## 2023-10-26 RX ADMIN — Medication 5 MG: at 16:20

## 2023-10-26 RX ADMIN — LIDOCAINE HYDROCHLORIDE 3 ML: 20 INJECTION, SOLUTION INFILTRATION; PERINEURAL at 15:14

## 2023-10-26 RX ADMIN — SODIUM CHLORIDE, SODIUM LACTATE, POTASSIUM CHLORIDE, CALCIUM CHLORIDE: 600; 310; 30; 20 INJECTION, SOLUTION INTRAVENOUS at 12:14

## 2023-10-26 RX ADMIN — DEXAMETHASONE SODIUM PHOSPHATE 10 MG: 4 INJECTION, SOLUTION INTRA-ARTICULAR; INTRALESIONAL; INTRAMUSCULAR; INTRAVENOUS; SOFT TISSUE at 15:14

## 2023-10-26 RX ADMIN — ACETAMINOPHEN 975 MG: 325 TABLET ORAL at 12:05

## 2023-10-26 RX ADMIN — GLYCOPYRROLATE 0.8 MG: 0.2 INJECTION, SOLUTION INTRAMUSCULAR; INTRAVENOUS at 16:20

## 2023-10-26 RX ADMIN — PROPOFOL 50 MCG/KG/MIN: 10 INJECTION, EMULSION INTRAVENOUS at 15:14

## 2023-10-26 RX ADMIN — FENTANYL CITRATE 100 MCG: 50 INJECTION, SOLUTION INTRAMUSCULAR; INTRAVENOUS at 15:14

## 2023-10-26 RX ADMIN — CEFAZOLIN SODIUM 2 G: 2 INJECTION, SOLUTION INTRAVENOUS at 15:10

## 2023-10-26 RX ADMIN — HYDROCODONE BITARTRATE AND ACETAMINOPHEN 1 TABLET: 5; 325 TABLET ORAL at 17:56

## 2023-10-26 RX ADMIN — ONDANSETRON 4 MG: 2 INJECTION INTRAMUSCULAR; INTRAVENOUS at 16:18

## 2023-10-26 RX ADMIN — SODIUM CHLORIDE, SODIUM LACTATE, POTASSIUM CHLORIDE, CALCIUM CHLORIDE: 600; 310; 30; 20 INJECTION, SOLUTION INTRAVENOUS at 15:39

## 2023-10-26 RX ADMIN — Medication 50 MG: at 15:14

## 2023-10-26 RX ADMIN — FENTANYL CITRATE 50 MCG: 0.05 INJECTION, SOLUTION INTRAMUSCULAR; INTRAVENOUS at 17:12

## 2023-10-26 RX ADMIN — PROPOFOL 200 MG: 10 INJECTION, EMULSION INTRAVENOUS at 15:14

## 2023-10-26 RX ADMIN — FENTANYL CITRATE 25 MCG: 0.05 INJECTION, SOLUTION INTRAMUSCULAR; INTRAVENOUS at 17:07

## 2023-10-26 ASSESSMENT — ENCOUNTER SYMPTOMS: SEIZURES: 0

## 2023-10-26 NOTE — DISCHARGE INSTRUCTIONS
If you have any questions or concerns regarding your procedure, please contact Dr. Stringer, his office number is [phone].    You have received 975 mg of Acetaminophen (Tylenol) at 12:05PM. Please do not take an additional dose of Tylenol until after 6:05PM.     Do not exceed 4,000 mg of acetaminophen during a 24 hour period and keep in mind that acetaminophen can also be found in many over-the-counter cold medications as well as narcotics that may be given for pain.     For 24 hours after surgery    Get plenty of rest.  A responsible adult must stay with you for at least 24 hours after you leave the hospital.   Do not drive or use heavy equipment.  If you have weakness or tingling, don't drive or use heavy equipment until this feeling goes away.  Do not drink alcohol.  Avoid strenuous or risky activities.  Ask for help when climbing stairs.   You may feel lightheaded.  IF so, sit for a few minutes before standing.  Have someone help you get up.   If you have nausea (feel sick to your stomach): Drink only clear liquids such as apple juice, ginger ale, broth or 7-Up.  Rest may also help.  Be sure to drink enough fluids.  Move to a regular diet as you feel able.  You may have a slight fever. Call the doctor if your fever is over 100 F (37.7 C) (taken under the tongue) or lasts longer than 24 hours.  You may have a dry mouth, a sore throat, muscle aches or trouble sleeping.  These should go away after 24 hours.  Do not make important or legal decisions.   Call your doctor for any of the followin.  Signs of infection (fever, growing tenderness at the surgery site, a large amount of drainage or bleeding, severe pain, foul-smelling drainage, redness, swelling).    2. It has been over 8 to 10 hours since surgery and you are still not able to urinate (pass water).    3.  Headache for over 24 hours.          
Moderate-High (Score 7-10)

## 2023-10-26 NOTE — ANESTHESIA CARE TRANSFER NOTE
Patient: Diane Costa    Procedure: Procedure(s):  POSTERIOR VAGINAL, REPAIR PARAVAGINAL REPAIR, PUBOVAGINAL SLING, CYSTOSCOPY       Diagnosis: Rectocele [N81.6]  Vaginal relaxation [N81.89]  Urinary incontinence, unspecified type [R32]  Diagnosis Additional Information: No value filed.    Anesthesia Type:   General     Note:    Oropharynx: oropharynx clear of all foreign objects and spontaneously breathing  Level of Consciousness: drowsy  Oxygen Supplementation: face mask  Level of Supplemental Oxygen (L/min / FiO2): 10  Independent Airway: airway patency satisfactory and stable  Dentition: dentition unchanged  Vital Signs Stable: post-procedure vital signs reviewed and stable  Report to RN Given: handoff report given  Patient transferred to: PACU    Handoff Report: Identifed the Patient, Identified the Reponsible Provider, Reviewed the pertinent medical history, Discussed the surgical course, Reviewed Intra-OP anesthesia mangement and issues during anesthesia, Set expectations for post-procedure period and Allowed opportunity for questions and acknowledgement of understanding      Vitals:  Vitals Value Taken Time   /87 10/26/23 1630   Temp 96.9  F (36.1  C) 10/26/23 1630   Pulse 86 10/26/23 1631   Resp 16 10/26/23 1630   SpO2 100 % 10/26/23 1631   Vitals shown include unfiled device data.    Electronically Signed By: SIRI Goss CRNA  October 26, 2023  4:35 PM

## 2023-10-26 NOTE — ANESTHESIA CARE TRANSFER NOTE
Patient: Diane Costa    Procedure: Procedure(s):  POSTERIOR VAGINAL REPAIR PARAVAGINAL REPAIR POSSIBLE PUBOVAGINAL SLING       Diagnosis: Rectocele [N81.6]  Vaginal relaxation [N81.89]  Urinary incontinence, unspecified type [R32]  Diagnosis Additional Information: No value filed.    Anesthesia Type:   General     Note:    Oropharynx: oropharynx clear of all foreign objects and spontaneously breathing  Level of Consciousness: awake  Oxygen Supplementation: face mask  Level of Supplemental Oxygen (L/min / FiO2): 6  Independent Airway: airway patency satisfactory and stable  Dentition: dentition unchanged  Vital Signs Stable: post-procedure vital signs reviewed and stable  Report to RN Given: handoff report given  Patient transferred to: PACU  Comments: Patient is writing in pain. Fentanyl given upon arrival to PACU. MD aware. Otherwise, VSS.  Fentanyl appears to be helping settle patient somewhat.   Local not used during case.   No toradol per surgeon request  Handoff Report: Identifed the Patient, Identified the Reponsible Provider, Reviewed the pertinent medical history, Discussed the surgical course, Reviewed Intra-OP anesthesia mangement and issues during anesthesia, Set expectations for post-procedure period and Allowed opportunity for questions and acknowledgement of understanding      Vitals:  Vitals Value Taken Time   /54 10/26/23 1454   Temp 37  C (98.6  F) 10/26/23 1454   Pulse 95 10/26/23 1454   Resp 24 10/26/23 1454   SpO2 100 % 10/26/23 1454       Electronically Signed By: SIRI Frazier CRNA  October 26, 2023  2:54 PM
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
## 2023-10-26 NOTE — ANESTHESIA PROCEDURE NOTES
Airway       Patient location during procedure: OR       Procedure Start/Stop Times: 10/26/2023 3:17 PM  Staff -        Anesthesiologist:  Lewis Ricks MD       CRNA: Rohan Pandya APRN CRNA       Performed By: CRNA  Consent for Airway        Urgency: elective  Indications and Patient Condition       Indications for airway management: kat-procedural       Induction type:intravenous       Mask difficulty assessment: 1 - vent by mask    Final Airway Details       Final airway type: endotracheal airway       Successful airway: ETT - single  Endotracheal Airway Details        ETT size (mm): 7.0       Cuffed: yes       Cuff volume (mL): 8       Successful intubation technique: direct laryngoscopy       DL Blade Type: Daly 2       Grade View of Cords: 1       Adjucts: stylet       Position: Right       Measured from: lips       Secured at (cm): 22       Bite block used: None    Post intubation assessment        Placement verified by: capnometry, equal breath sounds and chest rise        Number of attempts at approach: 1       Number of other approaches attempted: 0       Secured with: silk tape       Ease of procedure: easy       Dentition: Intact and Unchanged       Dental guard used and removed. Dental Guard Type: Proguard Red.    Medication(s) Administered   Medication Administration Time: 10/26/2023 3:17 PM

## 2023-10-26 NOTE — PROGRESS NOTES
Vaginal packing and catheter removed without difficulty.  300 mL of sterile water instilled in bladder prior to removal of catheter as ordered.  Cindy Galvan RN on 10/26/2023 at 5:21 PM     Occiput Posterior No

## 2023-10-26 NOTE — OP NOTE
Procedure Date:      PREOPERATIVE DIAGNOSES:       1.  Stage II pelvic organ prolapse.  2.  Stress urinary incontinence.     POSTOPERATIVE DIAGNOSES:       1.  Stage IIpelvic organ prolapse.  2.  Stress urinary incontinence.     PROCEDURES:       1.  Posterior vaginal repair.  2.  Paravaginal repair.  3.  Pubovaginal sling.     SURGEON:  Ramana Stringer MD  INDICATIONS:  The patient with progressively worsening prolapse and incontinence, desires surgery.     DESCRIPTION OF THE OPERATIVE PROCEDURE:  After assuring informed consent, the patient was taken to the operating room.  She was prepped and draped in the usual sterile manner.  The patient was placed and positioned by me, ensuring there was no excess flexion of the hips or the knees.  At this point, a eagle configuration was removed from the perineum.  The vaginal mucosa was then incised to the apex of the rectocele.  The vaginal mucosa and the rectovaginal septum were dissected from the rectum to the level of ischial spines bilaterally. At this point the rectavaginal septum was attached to the pelvic side wall.   Th rectovaginal septum was then embricated in the midline from the apex of the vagina to the perineal body.    The vaginal mucosa was then closed.  The perineum was repaired.  Attention was directed to the pubovaginal sling where a small incision was made in the suburethral area.  The vaginal mucosa and the pubocervical fascia were dissected from the bladder to the level of the pubic rami.  At this point, a Altis needle was placed on the right and left, the sling was then tensioned in response to Valsalva.  It was placed in a tension-free position.  At this point, the vaginal mucosa was closed.  Cystoscopy showed no bladder damage and bilateral ureteral efflux.  This concluded the procedure.  Instruments, needle, and sponge were correct x2.     ESTIMATED BLOOD LOSS:  50 mL.     COMPLICATIONS:  None.     PATHOLOGY:  None.     DRAINS:  Cortés to  gravity.     DISPOSITION:  The patient to recovery room in stable condition.     Ramana Stringer MD

## 2023-10-26 NOTE — PROGRESS NOTES
Called and left voicemail on Dr. Stringer's personal voicemail regarding patient's voids and bladder scan volume.  Requested return call.  Cindy Galvan RN on 10/26/2023 at 6:56 PM

## 2023-10-26 NOTE — ANESTHESIA POSTPROCEDURE EVALUATION
Patient: Diane Costa    Procedure: Procedure(s):  POSTERIOR VAGINAL, REPAIR PARAVAGINAL REPAIR, PUBOVAGINAL SLING, CYSTOSCOPY       Anesthesia Type:  General    Note:  Disposition: Outpatient   Postop Pain Control: Uneventful            Sign Out: Well controlled pain   PONV: No   Neuro/Psych: Uneventful            Sign Out: Acceptable/Baseline neuro status   Airway/Respiratory: Uneventful            Sign Out: Acceptable/Baseline resp. status   CV/Hemodynamics: Uneventful            Sign Out: Acceptable CV status; No obvious hypovolemia; No obvious fluid overload   Other NRE: NONE   DID A NON-ROUTINE EVENT OCCUR? No           Last vitals:  Vitals Value Taken Time   /77 10/26/23 1730   Temp 96.9  F (36.1  C) 10/26/23 1630   Pulse 76 10/26/23 1733   Resp 16 10/26/23 1630   SpO2 95 % 10/26/23 1735   Vitals shown include unfiled device data.    Electronically Signed By: Marta Alba MD  October 26, 2023  5:41 PM

## 2023-11-03 ENCOUNTER — MYC MEDICAL ADVICE (OUTPATIENT)
Dept: FAMILY MEDICINE | Facility: CLINIC | Age: 38
End: 2023-11-03

## 2023-11-03 ENCOUNTER — NURSE TRIAGE (OUTPATIENT)
Dept: FAMILY MEDICINE | Facility: CLINIC | Age: 38
End: 2023-11-03

## 2023-11-03 NOTE — TELEPHONE ENCOUNTER
"Nurse Triage SBAR    Is this a 2nd Level Triage? YES, LICENSED PRACTITIONER REVIEW IS REQUIRED    Situation: Patient sent MyC with image:  \"Good morning. I noticed this red splotches in my armpit this morning. It appears to have a white ring around it. Normally I'd just toss some lotion on and forget it. I haven't seen the white [place] before, so was just curious if its something to watch for.   Thanks.\"  [id number]    Background: Patient denies similar looking skin reaction in the past.    Assessment: Patient reports she noticed the spot this morning. White coloration noted on outer edges. Patient denies pain and itching. She has put lotion on it but no other creams or ointments.     No other new rashes or spots noted.    Patient denies fever or any other symptoms.    Protocol Recommended Disposition:   See in Office Within 2 Weeks    Recommendation: Care advise provided. Patient is wondering if she should make an appointment to be seen or if this can be treated with OTC medication. Routed to PCP to review and advise.     Routed to provider    Does the patient meet one of the following criteria for ADS visit consideration? 16+ years old, with an [facility] PCP     TIP  Providers, please consider if this condition is appropriate for management at one of our Acute and Diagnostic Services sites.     If patient is a good candidate, please use dotphrase <dot>triageresponse and select Refer to ADS to document.     Reason for Disposition   Red, moist, irritated area between skin folds (or under larger breasts)    Additional Information   Negative: Sounds like a life-threatening emergency to the triager   Negative: Athlete's Foot suspected (i.e., itchy rash between the toes)   Negative: Insect bite(s) suspected   Negative: Jock Itch suspected (i.e., itchy rash on inner thighs near genital area)   Negative: Localized lump (or swelling) without redness or rash   Negative: MPOX SUSPECTED (e.g., direct skin contact such as sex, " recent travel to [place] or [place]) and any SYMPTOMS OF MPOX (e.g., rash, fever, muscle aches, or swollen lymph nodes)   Negative: At risk for Mpox (men-who-have-sex-with-men) and possible exposure (e.g., multiple sex partners in past 21 days) and ANY SYMPTOMS OF MPOX (e.g., rash, fever, muscle aches, or swollen lymph nodes)   Negative: Poison ivy, oak, or sumac rash suspected (e.g., itchy rash after contact with poison ivy)   Negative: Rash of female genital area (e.g., labia, vagina, vulva)   Negative: Rash of male genital area (e.g., penis, scrotum)   Negative: Redness of immunization site   Negative: Shingles suspected (i.e., painful rash, multiple small blisters grouped together in one area of body; dermatomal distribution)   Negative: Small spot, skin growth, or mole   Negative: Wound infection suspected (i.e., pain, spreading redness, or pus; in a cut, puncture, scrape or sutured wound)   Negative: Fever and localized purple or blood-colored spots or dots that are not from injury or friction   Negative: Fever and localized rash is very painful   Negative: Patient sounds very sick or weak to the triager   Negative: Looks like a boil, infected sore, deep ulcer, or other infected rash (spreading redness, pus)   Negative: Painful rash with multiple small blisters grouped together (i.e., dermatomal distribution or 'band' or 'stripe')   Negative: Localized rash is very painful (no fever)   Negative: Localized purple or blood-colored spots or dots that are not from injury or friction (no fever)   Negative: Lyme disease suspected (e.g., bull's-eye rash or tick bite / exposure)   Negative: Patient wants to be seen   Negative: Tender bumps in armpits   Negative: Pimples (localized) and no improvement after using CARE ADVICE   Negative: SEVERE local itching persists after 2 days of steroid cream   Negative: Applying cream or ointment and it causes severe itch, burning, or pain   Negative: Medication patch causing  "local rash or itching   Negative: Localized rash present > 7 days    Answer Assessment - Initial Assessment Questions  1. APPEARANCE of RASH: \"Describe the rash.\"       See image, red [place]   2. LOCATION: \"Where is the rash located?\"       Right arm pit  3. NUMBER: \"How many spots are there?\"       One spot  4. SIZE: \"How big are the spots?\" (Inches, centimeters or compare to size of a coin)       Size of pinky nail  5. ONSET: \"When did the rash start?\"       Just noticed this morning  6. ITCHING: \"Does the rash itch?\" If Yes, ask: \"How bad is the itch?\"  (Scale 0-10; or none, mild, moderate, severe)      None  7. PAIN: \"Does the rash hurt?\" If Yes, ask: \"How bad is the pain?\"  (Scale 0-10; or none, mild, moderate, severe)     - NONE (0): no pain     - MILD (1-3): doesn't interfere with normal activities      - MODERATE (4-7): interferes with normal activities or awakens from sleep      - SEVERE (8-10): excruciating pain, unable to do any normal activities      None  8. OTHER SYMPTOMS: \"Do you have any other symptoms?\" (e.g., fever)      None  9. PREGNANCY: \"Is there any chance you are pregnant?\" \"When was your last menstrual period?\"      No    Protocols used: Rash or Redness - [id number]    Monse Stanford RN  [facility]    "

## 2023-11-03 NOTE — TELEPHONE ENCOUNTER
LMTRC for nurse triage.     When patient returns call, please route to RN.     Monse Stanford RN  [facility]

## 2023-11-03 NOTE — TELEPHONE ENCOUNTER
I think I'd probably start with some cortisone on it first and if not improving consider having you see someone next week.

## 2023-11-03 NOTE — TELEPHONE ENCOUNTER
Replied via my chart with Dr. Jefferson's message.    Ivonne Nava, ALYSONN RN  [facility]

## 2023-11-13 RX ORDER — MINOXIDIL 2.5 MG/1
TABLET ORAL
Status: CANCELLED | OUTPATIENT
Start: 2023-11-13

## 2023-11-13 NOTE — TELEPHONE ENCOUNTER
Unable to leave message for: Patient  Information to relay to patient: Please find out who prescribed this medication previously. Has she talked to Dr. Jefferson about filling this medication? If not, she should request refill from provider who prescribed previously.    Shayy Mtz CMA.

## 2023-11-13 NOTE — TELEPHONE ENCOUNTER
Not filled by us previously    Refill Request  Medication name: Pending Prescriptions:                       Disp   Refills    minoxidil (LONITEN) 2.5 MG tablet                           Requested Pharmacy:  [facility] [id number] [place] [id number]

## 2023-11-16 ENCOUNTER — APPOINTMENT (OUTPATIENT)
Dept: URBAN - METROPOLITAN AREA CLINIC 260 | Age: 38
Setting detail: DERMATOLOGY
End: 2023-11-18

## 2023-11-16 VITALS — WEIGHT: 215 LBS | HEIGHT: 68 IN

## 2023-11-16 DIAGNOSIS — L64.8 OTHER ANDROGENIC ALOPECIA: ICD-10-CM

## 2023-11-16 PROCEDURE — OTHER MIPS QUALITY: OTHER

## 2023-11-16 PROCEDURE — 99213 OFFICE O/P EST LOW 20 MIN: CPT

## 2023-11-16 PROCEDURE — OTHER PRESCRIPTION: OTHER

## 2023-11-16 PROCEDURE — OTHER COUNSELING: OTHER

## 2023-11-16 PROCEDURE — OTHER PHOTO-DOCUMENTATION: OTHER

## 2023-11-16 RX ORDER — SPIRONOLACTONE 50 MG/1
50MG TABLET, FILM COATED ORAL QD
Qty: 90 | Refills: 1 | Status: ERX | COMMUNITY
Start: 2023-11-16

## 2023-11-16 RX ORDER — MINOXIDIL 2.5 MG/1
2.5MG TABLET ORAL QD
Qty: 90 | Refills: 1 | Status: ERX | COMMUNITY
Start: 2023-11-16

## 2023-11-16 ASSESSMENT — LOCATION ZONE DERM: LOCATION ZONE: SCALP

## 2023-11-16 ASSESSMENT — LOCATION DETAILED DESCRIPTION DERM: LOCATION DETAILED: LEFT SUPERIOR PARIETAL SCALP

## 2023-11-16 ASSESSMENT — LOCATION SIMPLE DESCRIPTION DERM: LOCATION SIMPLE: SCALP

## 2023-11-16 NOTE — PROCEDURE: COUNSELING
Patient Specific Counseling (Will Not Stick From Patient To Patient): Disc the interaction btwn santos and NSAIDs and that it may increase her risk for hyperkalemia. She has had no side effects thus far so we will continue and monitor for changes. Follow up in 6 mo
Detail Level: Detailed

## 2024-02-06 DIAGNOSIS — I10 ESSENTIAL (PRIMARY) HYPERTENSION: ICD-10-CM

## 2024-02-06 NOTE — TELEPHONE ENCOUNTER
Refill Request  Medication name: Pending Prescriptions:                       Disp   Refills    metoprolol succinate ER (TOPROL XL) 100 M*90 tab*1            Sig: Take 1 tablet (100 mg) by mouth daily    Requested Pharmacy:  [address]

## 2024-02-07 RX ORDER — METOPROLOL SUCCINATE 100 MG/1
100 TABLET, EXTENDED RELEASE ORAL DAILY
Qty: 90 TABLET | Refills: 2 | Status: SHIPPED | OUTPATIENT
Start: 2024-02-07 | End: 2024-09-20

## 2024-02-15 NOTE — TELEPHONE ENCOUNTER
I sent in two separate prescriptions to reflect this.    1 Principal Discharge DX:	Tension headache

## 2024-02-16 DIAGNOSIS — Z30.41 ENCOUNTER FOR SURVEILLANCE OF CONTRACEPTIVE PILLS: ICD-10-CM

## 2024-02-16 RX ORDER — ACETAMINOPHEN AND CODEINE PHOSPHATE 120; 12 MG/5ML; MG/5ML
0.35 SOLUTION ORAL DAILY
Qty: 84 TABLET | Refills: 1 | Status: SHIPPED | OUTPATIENT
Start: 2024-02-16 | End: 2024-07-08

## 2024-02-16 NOTE — TELEPHONE ENCOUNTER
Refill Request  Medication name: Pending Prescriptions:                       Disp   Refills    norethindrone (INCASSIA) 0.35 MG tablet   84 tab*1            Sig: Take 1 tablet (0.35 mg) by mouth daily    Requested Pharmacy:  [id number] [address]  [address]

## 2024-06-20 ENCOUNTER — APPOINTMENT (OUTPATIENT)
Dept: URBAN - METROPOLITAN AREA CLINIC 260 | Age: 39
Setting detail: DERMATOLOGY
End: 2024-06-20

## 2024-06-20 VITALS — WEIGHT: 225 LBS | HEIGHT: 68 IN

## 2024-06-20 DIAGNOSIS — Z71.89 OTHER SPECIFIED COUNSELING: ICD-10-CM

## 2024-06-20 DIAGNOSIS — L82.1 OTHER SEBORRHEIC KERATOSIS: ICD-10-CM

## 2024-06-20 DIAGNOSIS — L64.8 OTHER ANDROGENIC ALOPECIA: ICD-10-CM

## 2024-06-20 DIAGNOSIS — D22 MELANOCYTIC NEVI: ICD-10-CM

## 2024-06-20 DIAGNOSIS — L81.4 OTHER MELANIN HYPERPIGMENTATION: ICD-10-CM

## 2024-06-20 DIAGNOSIS — D18.0 HEMANGIOMA: ICD-10-CM

## 2024-06-20 DIAGNOSIS — L72.8 OTHER FOLLICULAR CYSTS OF THE SKIN AND SUBCUTANEOUS TISSUE: ICD-10-CM

## 2024-06-20 PROBLEM — D22.5 MELANOCYTIC NEVI OF TRUNK: Status: ACTIVE | Noted: 2024-06-20

## 2024-06-20 PROBLEM — D18.01 HEMANGIOMA OF SKIN AND SUBCUTANEOUS TISSUE: Status: ACTIVE | Noted: 2024-06-20

## 2024-06-20 PROBLEM — D23.71 OTHER BENIGN NEOPLASM OF SKIN OF RIGHT LOWER LIMB, INCLUDING HIP: Status: ACTIVE | Noted: 2024-06-20

## 2024-06-20 PROCEDURE — OTHER PHOTO-DOCUMENTATION: OTHER

## 2024-06-20 PROCEDURE — OTHER MIPS QUALITY: OTHER

## 2024-06-20 PROCEDURE — OTHER COUNSELING: OTHER

## 2024-06-20 PROCEDURE — 99213 OFFICE O/P EST LOW 20 MIN: CPT

## 2024-06-20 PROCEDURE — OTHER PRESCRIPTION: OTHER

## 2024-06-20 PROCEDURE — OTHER PRESCRIPTION MEDICATION MANAGEMENT: OTHER

## 2024-06-20 RX ORDER — SPIRONOLACTONE 50 MG/1
50MG TABLET, FILM COATED ORAL QD
Qty: 90 | Refills: 3 | Status: ERX

## 2024-06-20 RX ORDER — MINOXIDIL 2.5 MG/1
2.5MG TABLET ORAL QD
Qty: 90 | Refills: 3 | Status: ERX

## 2024-06-20 ASSESSMENT — LOCATION SIMPLE DESCRIPTION DERM
LOCATION SIMPLE: LEFT UPPER BACK
LOCATION SIMPLE: SCALP
LOCATION SIMPLE: UPPER BACK
LOCATION SIMPLE: LOWER BACK

## 2024-06-20 ASSESSMENT — LOCATION DETAILED DESCRIPTION DERM
LOCATION DETAILED: LEFT SUPERIOR MEDIAL UPPER BACK
LOCATION DETAILED: INFERIOR THORACIC SPINE
LOCATION DETAILED: SUPERIOR LUMBAR SPINE
LOCATION DETAILED: LEFT SUPERIOR PARIETAL SCALP

## 2024-06-20 ASSESSMENT — LOCATION ZONE DERM
LOCATION ZONE: SCALP
LOCATION ZONE: TRUNK

## 2024-06-20 NOTE — PROCEDURE: PRESCRIPTION MEDICATION MANAGEMENT
Render In Strict Bullet Format?: No
Continue Regimen: spironolactone 50 mg tablet QD and minoxidil 2.5 mg tablet QD
Plan: Follow up yearly for refills or sooner if needed
Detail Level: Zone

## 2024-06-20 NOTE — HPI: FULL BODY SKIN EXAMINATION
What Type Of Note Output Would You Prefer (Optional)?: Standard Output
What Is The Reason For Today's Visit?: Annual Full Body Skin Examination with No Concerns
What Is The Reason For Today's Visit? (Being Monitored For X): concerning skin lesions on an annual basis
Additional History: Also following up on alopecia. Patient states things are going well

## 2024-06-20 NOTE — PROCEDURE: COUNSELING
Detail Level: Generalized
Detail Level: Detailed
Patient Specific Counseling (Will Not Stick From Patient To Patient): Disc the interaction btwn santos and NSAIDs and that it may increase her risk for hyperkalemia. She has had no side effects thus far so we will continue and monitor for changes. Follow up in 6 mo

## 2024-07-05 NOTE — TELEPHONE ENCOUNTER
Medication Question or Clarification  Who is calling: Pharmacy via fax  What medication are you calling about (include dose and sig)?: venlafaxine 225 mg TR24 [[id number]]    Electronically signed by: Isabel Jefferson MD on 10/09/20 1419 Status: Active   Ordering user: Isabel Jefferson MD 10/09/20 1419 Authorized by: Isabel Jefferson MD   Frequency: DAILY 10/09/20 - Until Discontinued   Diagnoses   Anxiety state [F41.1]       Who prescribed the medication?: Isabel Jefferson MD  What is your question/concern?: a 90 day Rx is being requested.  Requested Pharmacy: [facility]  Okay to leave a detailed message?: Yes         Cell Phone/PDA (specify)/Clothing

## 2024-07-07 DIAGNOSIS — Z30.41 ENCOUNTER FOR SURVEILLANCE OF CONTRACEPTIVE PILLS: ICD-10-CM

## 2024-07-08 ENCOUNTER — MYC MEDICAL ADVICE (OUTPATIENT)
Dept: FAMILY MEDICINE | Facility: CLINIC | Age: 39
End: 2024-07-08

## 2024-07-08 RX ORDER — NORETHINDRONE 0.35 MG/1
0.35 TABLET ORAL DAILY
Qty: 84 TABLET | Refills: 1 | Status: SHIPPED | OUTPATIENT
Start: 2024-07-08

## 2024-07-08 NOTE — TELEPHONE ENCOUNTER
Patient is due for a visit in October, physical would be great if we can help schedule. Refills sent for now.

## 2024-07-09 NOTE — TELEPHONE ENCOUNTER
Attempted to call patient, but no answer and unable to leave message due to mailbox being full. Will attempt to call patient tomorrow.      If patient calls back, please route to [facility].     TC please route to RN.    Shannan Ledbetter RN  [facility]

## 2024-07-10 NOTE — TELEPHONE ENCOUNTER
S-(situation): Raising [employer] message from yesterday:  I haven't had a bad numbness or tingling episode in quite a while, but today it's been off and on full force. Primarily on my left side ... Elbow to fingertips feel painfully tingly, heavy, weak. Leg gets randomly numb. My right side kicks in now and again, mostly my foot and forearm. Super strange. I'm not sure why the sudden change, but I figure it's better to make note of it now.     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     A-(assessment): Patient has had no change in medications or any other symptoms.  She states that it has gotten numb/tingling more often and lasts about an hour and then resolves.    R-(recommendations): Patient is wondering if she should consult neurology or if you have other recommendations.  She has not consulted neurology yet.     SOPHIE Gonzalez RN  [facility]

## 2024-07-10 NOTE — TELEPHONE ENCOUNTER
Spoke with patient to relay provider message. She verbalized understanding and will reach out to neurology.    James Millard RN  [facility]

## 2024-07-10 NOTE — TELEPHONE ENCOUNTER
No answer. No voicemail.     [employer] message sent to patient. If patient calls back, please help schedule as noted below.

## 2024-07-10 NOTE — TELEPHONE ENCOUNTER
It may be a good idea to reach out to neurology and see if they fell she should have repeat imaging since the symptoms have changed.

## 2024-09-18 ENCOUNTER — MYC MEDICAL ADVICE (OUTPATIENT)
Dept: FAMILY MEDICINE | Facility: CLINIC | Age: 39
End: 2024-09-18
Payer: COMMERCIAL

## 2024-09-18 ENCOUNTER — NURSE TRIAGE (OUTPATIENT)
Dept: FAMILY MEDICINE | Facility: CLINIC | Age: 39
End: 2024-09-18
Payer: COMMERCIAL

## 2024-09-18 NOTE — TELEPHONE ENCOUNTER
Nurse Triage SBAR    Is this a 2nd Level Triage? NO    Situation: Unexplained bruises     Background: Family history of leukemia, taking daily aspirin 81 mg    Assessment: Patient called in with complaints of frequent bruising that has occurred to her left leg with unexplained cause. She is taking a daily baby aspirin and has a family history of leukemia on her mothers side. She does not have any other symptoms associated with this. Her left leg is not warmer, larger, or in pain when compared to her other leg. An appointment was recommended within the next few days for a provider to examine the patient. Red flag symptoms reviewed with patient for when she should be seen in an UC or ED and patient verbalized understanding and agreed with plan.    Protocol Recommended Disposition:   See in Office Within 3 Days    Recommendation: See in office within 3 days, appointment set for Friday       Does the patient meet one of the following criteria for ADS visit consideration? 16+ years old, with an [facility] PCP     TIP  Providers, please consider if this condition is appropriate for management at one of our Acute and Diagnostic Services sites.     If patient is a good candidate, please use dotphrase <dot>triageresponse and select Refer to ADS to document.  Reason for Disposition   Less than 5 unxplained bruises now, NOT caused by an injury    Additional Information   Negative: Shock suspected (e.g., cold/pale/clammy skin, too weak to stand, low BP, rapid pulse)   Negative: Fever and purple or blood-colored spots or dots   Negative: Sounds like a life-threatening emergency to the triager   Negative: Bruise(s) of forehead or head   Negative: Bruise(s) of face or jaw   Negative: Patient has a concerning injury (e.g., chest, neck, leg)   Negative: Post-operative bruising   Negative: Dizziness or lightheadedness   Negative: Bruise on head, face, chest, or abdomen and taking Coumadin (warfarin) or other strong blood thinner, or known  "bleeding disorder (e.g., thrombocytopenia)   Negative: Unexplained bleeding from another site (e.g., gums, nose, urine) as well   Negative: Patient sounds very sick or weak to the triager   Negative: SEVERE pain and not improved 2 hours after pain medicine/ice packs   Negative: Purple or blood-colored spots or dots that are not from injury or friction (no fever and sounds well to triager)   Negative: 5 or more bruises now, NOT caused by an injury   Negative: Raised bruise and size > 2 inches (5 cm) and getting bigger   Negative: Taking Coumadin (warfarin) or other strong blood thinner, or known bleeding disorder (e.g., thrombocytopenia)   Negative: Suspicious history for the injury   Negative: Minor bruising at site of heparin injection (e.g., heparin, Fragmin, Innohep, Lovenox)    Answer Assessment - Initial Assessment Questions  1. APPEARANCE of BRUISE: \"Describe the bruise.\"       Large black and blue bruises, one with pin prick dot  2. SIZE: \"How large is the bruise?\"       Around tennis ball to soft ball sized  3. NUMBER: \"How many bruises are there?\"       3  4. LOCATION: \"Where is the bruise located?\"       All on left leg  5. ONSET: \"How long ago did the bruise occur?\"       End of August   6. CAUSE: \"Tell me how it happened.\"      Not sure  7. MEDICAL HISTORY: \"Do you have any medical problems that can cause easy bruising or bleeding?\" (e.g., leukemia, liver disease, recent chemotherapy)      No, family history of leukemia   8. MEDICINES: \"Do you take any medications which thin the blood such as: aspirin, heparin, ibuprofen (NSAIDS), Plavix, or Coumadin?\"      Baby aspirin daily   9. OTHER SYMPTOMS: \"Do you have any other symptoms?\"  (e.g., weakness, dizziness, pain, fever, nosebleed, blood in urine/stool)      Sometimes she has restless legs  10. PREGNANCY: \"Is there any chance you are pregnant?\" \"When was your last menstrual period?\"        No    Protocols used: Bruises-A-OH    James Millard RN  [facility]

## 2024-09-20 ENCOUNTER — OFFICE VISIT (OUTPATIENT)
Dept: FAMILY MEDICINE | Facility: CLINIC | Age: 39
End: 2024-09-20
Payer: COMMERCIAL

## 2024-09-20 VITALS
DIASTOLIC BLOOD PRESSURE: 83 MMHG | HEIGHT: 68 IN | HEART RATE: 102 BPM | SYSTOLIC BLOOD PRESSURE: 133 MMHG | TEMPERATURE: 98 F | BODY MASS INDEX: 36.04 KG/M2 | RESPIRATION RATE: 14 BRPM | OXYGEN SATURATION: 97 % | WEIGHT: 237.8 LBS

## 2024-09-20 DIAGNOSIS — I10 BENIGN ESSENTIAL HYPERTENSION: ICD-10-CM

## 2024-09-20 DIAGNOSIS — T14.8XXA BRUISING: Primary | ICD-10-CM

## 2024-09-20 DIAGNOSIS — Z13.220 LIPID SCREENING: ICD-10-CM

## 2024-09-20 PROBLEM — K58.9 IRRITABLE BOWEL SYNDROME: Status: ACTIVE | Noted: 2024-09-20

## 2024-09-20 PROBLEM — I77.74 VERTEBRAL ARTERY DISSECTION (H): Status: ACTIVE | Noted: 2020-02-01

## 2024-09-20 PROBLEM — O36.1120 ISOIMMUNIZATION FROM BLOOD GROUP INCOMPATIBILITY DURING PREGNANCY IN SECOND TRIMESTER: Status: ACTIVE | Noted: 2017-10-27

## 2024-09-20 PROBLEM — E78.1 ESSENTIAL HYPERTRIGLYCERIDEMIA: Status: ACTIVE | Noted: 2024-09-20

## 2024-09-20 LAB
ALBUMIN SERPL BCG-MCNC: 4.2 G/DL (ref 3.5–5.2)
ALP SERPL-CCNC: 69 U/L (ref 40–150)
ALT SERPL W P-5'-P-CCNC: 34 U/L (ref 0–50)
ANION GAP SERPL CALCULATED.3IONS-SCNC: 9 MMOL/L (ref 7–15)
AST SERPL W P-5'-P-CCNC: 30 U/L (ref 0–45)
BASOPHILS # BLD AUTO: 0 10E3/UL (ref 0–0.2)
BASOPHILS NFR BLD AUTO: 0 %
BILIRUB SERPL-MCNC: 0.2 MG/DL
BUN SERPL-MCNC: 18 MG/DL (ref 6–20)
CALCIUM SERPL-MCNC: 9.6 MG/DL (ref 8.8–10.4)
CHLORIDE SERPL-SCNC: 104 MMOL/L (ref 98–107)
CHOLEST SERPL-MCNC: 170 MG/DL
CREAT SERPL-MCNC: 0.72 MG/DL (ref 0.51–0.95)
EGFRCR SERPLBLD CKD-EPI 2021: >90 ML/MIN/1.73M2
EOSINOPHIL # BLD AUTO: 0.1 10E3/UL (ref 0–0.7)
EOSINOPHIL NFR BLD AUTO: 2 %
ERYTHROCYTE [DISTWIDTH] IN BLOOD BY AUTOMATED COUNT: 12.2 % (ref 10–15)
FASTING STATUS PATIENT QL REPORTED: ABNORMAL
FASTING STATUS PATIENT QL REPORTED: NORMAL
FERRITIN SERPL-MCNC: 20 NG/ML (ref 6–175)
GLUCOSE SERPL-MCNC: 94 MG/DL (ref 70–99)
HCO3 SERPL-SCNC: 25 MMOL/L (ref 22–29)
HCT VFR BLD AUTO: 41.5 % (ref 35–47)
HDLC SERPL-MCNC: 40 MG/DL
HGB BLD-MCNC: 13.4 G/DL (ref 11.7–15.7)
IMM GRANULOCYTES # BLD: 0 10E3/UL
IMM GRANULOCYTES NFR BLD: 0 %
INR PPP: 0.89 (ref 0.85–1.15)
LDLC SERPL CALC-MCNC: 91 MG/DL
LYMPHOCYTES # BLD AUTO: 1.6 10E3/UL (ref 0.8–5.3)
LYMPHOCYTES NFR BLD AUTO: 21 %
MCH RBC QN AUTO: 29 PG (ref 26.5–33)
MCHC RBC AUTO-ENTMCNC: 32.3 G/DL (ref 31.5–36.5)
MCV RBC AUTO: 90 FL (ref 78–100)
MONOCYTES # BLD AUTO: 0.4 10E3/UL (ref 0–1.3)
MONOCYTES NFR BLD AUTO: 5 %
NEUTROPHILS # BLD AUTO: 5.3 10E3/UL (ref 1.6–8.3)
NEUTROPHILS NFR BLD AUTO: 72 %
NONHDLC SERPL-MCNC: 130 MG/DL
PLATELET # BLD AUTO: 268 10E3/UL (ref 150–450)
POTASSIUM SERPL-SCNC: 4.3 MMOL/L (ref 3.4–5.3)
PROT SERPL-MCNC: 6.9 G/DL (ref 6.4–8.3)
RBC # BLD AUTO: 4.62 10E6/UL (ref 3.8–5.2)
SODIUM SERPL-SCNC: 138 MMOL/L (ref 135–145)
TRIGL SERPL-MCNC: 197 MG/DL
WBC # BLD AUTO: 7.4 10E3/UL (ref 4–11)

## 2024-09-20 PROCEDURE — 82728 ASSAY OF FERRITIN: CPT | Performed by: NURSE PRACTITIONER

## 2024-09-20 PROCEDURE — 80053 COMPREHEN METABOLIC PANEL: CPT | Performed by: NURSE PRACTITIONER

## 2024-09-20 PROCEDURE — 36415 COLL VENOUS BLD VENIPUNCTURE: CPT | Performed by: NURSE PRACTITIONER

## 2024-09-20 PROCEDURE — 80061 LIPID PANEL: CPT | Performed by: NURSE PRACTITIONER

## 2024-09-20 PROCEDURE — 99214 OFFICE O/P EST MOD 30 MIN: CPT | Performed by: NURSE PRACTITIONER

## 2024-09-20 PROCEDURE — 85025 COMPLETE CBC W/AUTO DIFF WBC: CPT | Performed by: NURSE PRACTITIONER

## 2024-09-20 PROCEDURE — 85610 PROTHROMBIN TIME: CPT | Performed by: NURSE PRACTITIONER

## 2024-09-20 RX ORDER — METOPROLOL SUCCINATE 100 MG/1
100 TABLET, EXTENDED RELEASE ORAL DAILY
Qty: 90 TABLET | Refills: 3 | Status: SHIPPED | OUTPATIENT
Start: 2024-09-20

## 2024-09-20 ASSESSMENT — PATIENT HEALTH QUESTIONNAIRE - PHQ9
SUM OF ALL RESPONSES TO PHQ QUESTIONS 1-9: 21
SUM OF ALL RESPONSES TO PHQ QUESTIONS 1-9: 21
10. IF YOU CHECKED OFF ANY PROBLEMS, HOW DIFFICULT HAVE THESE PROBLEMS MADE IT FOR YOU TO DO YOUR WORK, TAKE CARE OF THINGS AT HOME, OR GET ALONG WITH OTHER PEOPLE: VERY DIFFICULT

## 2024-09-20 ASSESSMENT — PAIN SCALES - GENERAL: PAINLEVEL: NO PAIN (0)

## 2024-09-20 NOTE — PROGRESS NOTES
"  Mariela Contreras is a 39 year old, presenting for the following health issues:  Bleeding/Bruising (Pt has been a bruiser for a long time. Pt has been having more and more bruising and pt and her mother are concerned. Pt states she has had a huge one on the back of her left calf that was about 3-4 inches in diameter. )        9/20/2024     9:43 AM   Additional Questions   Roomed by Shayy FENG CMA     History of Present Illness       Reason for visit:  Bruising  Symptom onset:  3-4 weeks ago   She is taking medications regularly.     Here for evaluation of bruising.  States has always seemed to bruise easily but more noticeable of lately to the point that her mother is concerned.  FH- Maternal Aunt had leukemia.   Hypertension Follow-up    Do you check your blood pressure regularly outside of the clinic? No   Are you following a low salt diet? Yes  Are your blood pressures ever more than 140 on the top number (systolic) OR more   than 90 on the bottom number (diastolic), for example 140/90? No      Review of Systems  Constitutional, neuro, ENT, endocrine, pulmonary, cardiac, gastrointestinal, genitourinary, musculoskeletal, integument and psychiatric systems are negative, except as otherwise noted.      Objective    /83 (BP Location: Left arm, Patient Position: Sitting, Cuff Size: Adult Regular)   Pulse 102   Temp 98  F (36.7  C) (Oral)   Resp 14   Ht 1.734 m (5' 8.25\")   Wt 107.9 kg (237 lb 12.8 oz)   LMP 09/13/2024 (Exact Date)   SpO2 97%   Breastfeeding No   BMI 35.89 kg/m    Body mass index is 35.89 kg/m .  Physical Exam   GENERAL: alert and no distress  RESP: lungs clear to auscultation - no rales, rhonchi or wheezes  CV: regular rate and rhythm, normal S1 S2, no S3 or S4, no murmur, click or rub, no peripheral edema   MS: no gross musculoskeletal defects noted, no edema  NEURO: Normal strength and tone, mentation intact and speech normal  PSYCH: mentation appears normal, affect " normal/bright    Results for orders placed or performed in visit on 09/20/24 (from the past 24 hour(s))   CBC with platelets and differential    Narrative    The following orders were created for panel order CBC with platelets and differential.  Procedure                               Abnormality         Status                     ---------                               -----------         ------                     CBC with platelets and d...[[id number]]                      Final result                 Please view results for these tests on the individual orders.   CBC with platelets and differential   Result Value Ref Range    WBC Count 7.4 4.0 - 11.0 10e3/uL    RBC Count 4.62 3.80 - 5.20 10e6/uL    Hemoglobin 13.4 11.7 - 15.7 g/dL    Hematocrit 41.5 35.0 - 47.0 %    MCV 90 78 - 100 fL    MCH 29.0 26.5 - 33.0 pg    MCHC 32.3 31.5 - 36.5 g/dL    RDW 12.2 10.0 - 15.0 %    Platelet Count 268 150 - 450 10e3/uL    % Neutrophils 72 %    % Lymphocytes 21 %    % Monocytes 5 %    % Eosinophils 2 %    % Basophils 0 %    % Immature Granulocytes 0 %    Absolute Neutrophils 5.3 1.6 - 8.3 10e3/uL    Absolute Lymphocytes 1.6 0.8 - 5.3 10e3/uL    Absolute Monocytes 0.4 0.0 - 1.3 10e3/uL    Absolute Eosinophils 0.1 0.0 - 0.7 10e3/uL    Absolute Basophils 0.0 0.0 - 0.2 10e3/uL    Absolute Immature Granulocytes 0.0 <=0.4 10e3/uL       A/P:  1. Bruising  - Comprehensive metabolic panel (BMP + Alb, Alk Phos, ALT, AST, Total. Bili, TP); Future  - CBC with platelets and differential; Future  - INR; Future  - Ferritin; Future  - Comprehensive metabolic panel (BMP + Alb, Alk Phos, ALT, AST, Total. Bili, TP)  - CBC with platelets and differential  - INR  - Ferritin      2. Benign essential hypertension  Stable  - Comprehensive metabolic panel (BMP + Alb, Alk Phos, ALT, AST, Total. Bili, TP); Future  - CBC with platelets and differential; Future  - Comprehensive metabolic panel (BMP + Alb, Alk Phos, ALT, AST, Total. Bili, TP)  - CBC with  platelets and differential  - metoprolol succinate ER (TOPROL XL) 100 MG 24 hr tablet; Take 1 tablet (100 mg) by mouth daily.  Dispense: 90 tablet; Refill: 3    3. Lipid screening  - Lipid panel reflex to direct LDL Non-fasting; Future  - Lipid panel reflex to direct LDL Non-fasting    Signed Electronically by: Erin Garcia CNP

## 2025-01-22 ENCOUNTER — OFFICE VISIT (OUTPATIENT)
Dept: FAMILY MEDICINE | Facility: CLINIC | Age: 40
End: 2025-01-22
Payer: COMMERCIAL

## 2025-01-22 VITALS
SYSTOLIC BLOOD PRESSURE: 130 MMHG | TEMPERATURE: 97.8 F | WEIGHT: 249 LBS | RESPIRATION RATE: 16 BRPM | HEART RATE: 92 BPM | DIASTOLIC BLOOD PRESSURE: 80 MMHG | BODY MASS INDEX: 37.74 KG/M2 | HEIGHT: 68 IN | OXYGEN SATURATION: 99 %

## 2025-01-22 DIAGNOSIS — Z11.3 SCREEN FOR STD (SEXUALLY TRANSMITTED DISEASE): ICD-10-CM

## 2025-01-22 DIAGNOSIS — Z23 IMMUNIZATION DUE: ICD-10-CM

## 2025-01-22 DIAGNOSIS — Z00.00 ENCOUNTER FOR ROUTINE HISTORY AND PHYSICAL EXAMINATION OF ADULT: Primary | ICD-10-CM

## 2025-01-22 DIAGNOSIS — Z12.4 CERVICAL CANCER SCREENING: ICD-10-CM

## 2025-01-22 DIAGNOSIS — Z30.2 ENCOUNTER FOR STERILIZATION: ICD-10-CM

## 2025-01-22 PROCEDURE — 87491 CHLMYD TRACH DNA AMP PROBE: CPT | Performed by: FAMILY MEDICINE

## 2025-01-22 PROCEDURE — 90471 IMMUNIZATION ADMIN: CPT | Performed by: FAMILY MEDICINE

## 2025-01-22 PROCEDURE — 90746 HEPB VACCINE 3 DOSE ADULT IM: CPT | Performed by: FAMILY MEDICINE

## 2025-01-22 PROCEDURE — 87591 N.GONORRHOEAE DNA AMP PROB: CPT | Performed by: FAMILY MEDICINE

## 2025-01-22 PROCEDURE — 99395 PREV VISIT EST AGE 18-39: CPT | Mod: 25 | Performed by: FAMILY MEDICINE

## 2025-01-22 PROCEDURE — 90472 IMMUNIZATION ADMIN EACH ADD: CPT | Performed by: FAMILY MEDICINE

## 2025-01-22 PROCEDURE — 90715 TDAP VACCINE 7 YRS/> IM: CPT | Performed by: FAMILY MEDICINE

## 2025-01-22 SDOH — HEALTH STABILITY: PHYSICAL HEALTH: ON AVERAGE, HOW MANY DAYS PER WEEK DO YOU ENGAGE IN MODERATE TO STRENUOUS EXERCISE (LIKE A BRISK WALK)?: 2 DAYS

## 2025-01-22 ASSESSMENT — SOCIAL DETERMINANTS OF HEALTH (SDOH): HOW OFTEN DO YOU GET TOGETHER WITH FRIENDS OR RELATIVES?: ONCE A WEEK

## 2025-01-22 ASSESSMENT — PATIENT HEALTH QUESTIONNAIRE - PHQ9
SUM OF ALL RESPONSES TO PHQ QUESTIONS 1-9: 16
SUM OF ALL RESPONSES TO PHQ QUESTIONS 1-9: 16
10. IF YOU CHECKED OFF ANY PROBLEMS, HOW DIFFICULT HAVE THESE PROBLEMS MADE IT FOR YOU TO DO YOUR WORK, TAKE CARE OF THINGS AT HOME, OR GET ALONG WITH OTHER PEOPLE: SOMEWHAT DIFFICULT

## 2025-01-22 NOTE — PROGRESS NOTES
"Preventive Care Visit  [facility]  Isabel Jefferson MD, Family Medicine  Jan 22, 2025      Assessment & Plan     Encounter for routine history and physical examination of adult  -Routine health maintenance discussion:  No smoking, limited alcohol (7 or less servings per week), 5 fruits/veg servings per day, 200 minutes of exercise per week.  Daily calcium/vitamin D guidelines, bone health, colon cancer screening beginning at age 50.  Accident avoidance, sun screen.   -Not taking blood pressure medication, blood pressure here today is normal as well as at home.  For now continue to monitor.    Encounter for sterilization  -Referral to OB for discussion on tubal removal, not taking birth control pills.  - Ob/Gyn  Referral    Cervical cancer screening  -Updated today, plan on repeat in 5 years if normal  - HPV and Gynecologic Cytology Panel - Recommended Age 30 - 65 Years    Screen for STD (sexually transmitted disease)  - Chlamydia trachomatis/Neisseria gonorrhoeae by PCR - Clinic Collect    Immunization due  - TDAP 10-64Y (ADACEL,BOOSTRIX)  - HEPATITIS B, ADULT 20+ (ENGERIX-B/RECOMBIVAX HB)  - HEPATITIS B, ADULT 20+ (ENGERIX-B/RECOMBIVAX HB)              BMI  Estimated body mass index is 37.58 kg/m  as calculated from the following:    Height as of this encounter: 1.734 m (5' 8.25\").    Weight as of this encounter: 112.9 kg (249 lb).   Weight management plan: Discussed healthy diet and exercise guidelines    Counseling  Appropriate preventive services were addressed with this patient via screening, questionnaire, or discussion as appropriate for fall prevention, nutrition, physical activity, Tobacco-use cessation, social engagement, weight loss and cognition.  Checklist reviewing preventive services available has been given to the patient.  Reviewed patient's diet, addressing concerns and/or questions.   She is at risk for lack of exercise and has been provided with information " to increase physical activity for the benefit of her well-being.   She is at risk for psychosocial distress and has been provided with information to reduce risk.   The patient's PHQ-9 score is consistent with moderate depression. She was provided with information regarding depression.           Mariela Contreras is a 39 year old, presenting for the following:  Physical        1/22/2025     9:58 AM   Additional Questions   Roomed by LEONARD Batista          HPI    She has had some hives on her abdomen. She does have itching, no new products. She hasn't had any recent illness.     She is not taking her medications well. She does wonder about getting her tubes tied.     Her neuropathy has been good. BP has been good at home. Not really taking her medications. Her divorce is almost final, moved in with her parents.     Health Care Directive  Patient does not have a Health Care Directive: Discussed advance care planning with patient; information given to patient to review.      1/22/2025   General Health   How would you rate your overall physical health? (!) FAIR   Feel stress (tense, anxious, or unable to sleep) Very much   (!) STRESS CONCERN      1/22/2025   Nutrition   Three or more servings of calcium each day? Yes   Diet: Regular (no restrictions)   How many servings of fruit and vegetables per day? 4 or more   How many sweetened beverages each day? (!) 2         1/22/2025   Exercise   Days per week of moderate/strenous exercise 2 days   (!) EXERCISE CONCERN      1/22/2025   Social Factors   Frequency of gathering with friends or relatives Once a week   Worry food won't last until get money to buy more No   Food not last or not have enough money for food? No   Do you have housing? (Housing is defined as stable permanent housing and does not include staying ouside in a car, in a tent, in an abandoned building, in an overnight shelter, or couch-surfing.) Yes   Are you worried about losing your housing? No   Lack of  transportation? No   Unable to get utilities (heat,electricity)? No         1/22/2025   Dental   Dentist two times every year? Yes          Today's PHQ-9 Score:       1/22/2025     9:47 AM   PHQ-9 SCORE   PHQ-9 Total Score MyChart 16 (Moderately severe depression)   PHQ-9 Total Score 16        Patient-reported         1/22/2025   Substance Use   Alcohol more than 3/day or more than 7/wk Not Applicable   Do you use any other substances recreationally? No     Social History     Tobacco Use    Smoking status: Never     Passive exposure: Never    Smokeless tobacco: Never   Vaping Use    Vaping status: Never Used   Substance Use Topics    Alcohol use: Yes     Alcohol/week: 0.0 - 1.0 standard drinks of alcohol    Drug use: No          Mammogram Screening - Mammogram every 1-2 years updated in Health Maintenance based on mutual decision making        1/22/2025   STI Screening   New sexual partner(s) since last STI/HIV test? (!) YES      History of abnormal Pap smear: No - age 30-64 HPV with reflex Pap every 5 years recommended        Latest Ref Rng & Units 4/12/2019     3:36 PM 10/11/2017     4:15 PM 7/11/2014     4:52 PM   PAP / HPV   PAP Negative for squamous intraepithelial lesion or malignancy. Negative for squamous intraepithelial lesion or malignancy  Electronically signed by Blanquita Suazo CT (ASCP) on 4/19/2019 at  9:44 AM    Atypical squamous cells of undetermined significance  Electronically signed by Roxanne Hannah MD on 10/18/2017 at  4:44 PM    Negative for squamous intraepithelial lesion or malignancy  Electronically signed by Blanquita Miles CT (ASCP) on 7/22/2014 at  3:48 PM      HPV 16 DNA NEG Negative      HPV 18 DNA NEG Negative      Other HR HPV NEG Negative              1/22/2025   Contraception/Family Planning   Questions about contraception or family planning (!) YES         Reviewed and updated as needed this visit by Provider   Tobacco  Allergies  Meds  Problems  Med Hx  Surg Hx   "Fam Hx                Review of Systems  Constitutional, HEENT, cardiovascular, pulmonary, gi and gu systems are negative, except as otherwise noted.     Objective    Exam  /80   Pulse 92   Temp 97.8  F (36.6  C)   Resp 16   Ht 1.734 m (5' 8.25\")   Wt 112.9 kg (249 lb)   LMP 01/09/2025   SpO2 99%   BMI 37.58 kg/m     Estimated body mass index is 37.58 kg/m  as calculated from the following:    Height as of this encounter: 1.734 m (5' 8.25\").    Weight as of this encounter: 112.9 kg (249 lb).    Physical Exam  GENERAL: alert and no distress  EYES: Eyes grossly normal to inspection, PERRL and conjunctivae and sclerae normal  HENT: ear canals and TM's normal, nose and mouth without ulcers or lesions  NECK: no adenopathy, no asymmetry, masses, or scars  RESP: lungs clear to auscultation - no rales, rhonchi or wheezes  CV: regular rate and rhythm, normal S1 S2, no S3 or S4, no murmur, click or rub, no peripheral edema  ABDOMEN: soft, nontender, no hepatosplenomegaly, no masses and bowel sounds normal   (female): normal female external genitalia, normal urethral meatus, normal vaginal mucosa, cervix is normal appearing  MS: no gross musculoskeletal defects noted, no edema  SKIN: no suspicious lesions or rashes  NEURO: Normal strength and tone, mentation intact and speech normal  PSYCH: mentation appears normal, affect normal/bright        Signed Electronically by: Isabel Jefferson MD    Answers submitted by the patient for this visit:  Patient Health Questionnaire (Submitted on 1/22/2025)  If you checked off any problems, how difficult have these problems made it for you to do your work, take care of things at home, or get along with other people?: Somewhat difficult  PHQ9 TOTAL SCORE: 16    "

## 2025-01-23 LAB
C TRACH DNA SPEC QL PROBE+SIG AMP: NEGATIVE
N GONORRHOEA DNA SPEC QL NAA+PROBE: NEGATIVE
SPECIMEN TYPE: NORMAL

## 2025-01-27 LAB
HPV HR 12 DNA CVX QL NAA+PROBE: POSITIVE
HPV16 DNA CVX QL NAA+PROBE: NEGATIVE
HPV18 DNA CVX QL NAA+PROBE: NEGATIVE
HUMAN PAPILLOMA VIRUS FINAL DIAGNOSIS: ABNORMAL

## 2025-01-28 ENCOUNTER — TELEPHONE (OUTPATIENT)
Dept: FAMILY MEDICINE | Facility: CLINIC | Age: 40
End: 2025-01-28
Payer: COMMERCIAL

## 2025-01-28 ENCOUNTER — PATIENT OUTREACH (OUTPATIENT)
Dept: FAMILY MEDICINE | Facility: CLINIC | Age: 40
End: 2025-01-28
Payer: COMMERCIAL

## 2025-01-28 PROBLEM — R87.612 PAPANICOLAOU SMEAR OF CERVIX WITH LOW GRADE SQUAMOUS INTRAEPITHELIAL LESION (LGSIL): Status: ACTIVE | Noted: 2025-01-28

## 2025-01-28 PROBLEM — R87.810 CERVICAL HIGH RISK HPV (HUMAN PAPILLOMAVIRUS) TEST POSITIVE: Status: ACTIVE | Noted: 2025-01-28

## 2025-01-28 LAB
BKR AP ASSOCIATED HPV REPORT: ABNORMAL
BKR LAB AP GYN ADEQUACY: ABNORMAL
BKR LAB AP GYN INTERPRETATION: ABNORMAL
BKR LAB AP PREVIOUS ABNORMAL: ABNORMAL
PATH REPORT.COMMENTS IMP SPEC: ABNORMAL
PATH REPORT.COMMENTS IMP SPEC: ABNORMAL
PATH REPORT.RELEVANT HX SPEC: ABNORMAL

## 2025-01-28 NOTE — TELEPHONE ENCOUNTER
Please call patient to assist her in scheduling a colposcopy with Dr. Jefferson prior to 04/22/25.      Ok to leave detailed message if patient doesn't answer.      Patient has been advised of pap results and recommendations for follow up.      Thank you,   Yany Banegas, Cervical Cancer Resulting-RN.

## 2025-01-31 ENCOUNTER — TRANSFERRED RECORDS (OUTPATIENT)
Dept: HEALTH INFORMATION MANAGEMENT | Facility: CLINIC | Age: 40
End: 2025-01-31
Payer: COMMERCIAL

## 2025-01-31 ENCOUNTER — LAB REQUISITION (OUTPATIENT)
Dept: LAB | Facility: CLINIC | Age: 40
End: 2025-01-31

## 2025-01-31 DIAGNOSIS — R87.619 UNSPECIFIED ABNORMAL CYTOLOGICAL FINDINGS IN SPECIMENS FROM CERVIX UTERI: ICD-10-CM

## 2025-01-31 PROCEDURE — 88305 TISSUE EXAM BY PATHOLOGIST: CPT | Performed by: PATHOLOGY

## 2025-02-04 ENCOUNTER — NURSE TRIAGE (OUTPATIENT)
Dept: FAMILY MEDICINE | Facility: CLINIC | Age: 40
End: 2025-02-04
Payer: COMMERCIAL

## 2025-02-04 DIAGNOSIS — I10 BENIGN ESSENTIAL HYPERTENSION: Primary | ICD-10-CM

## 2025-02-04 DIAGNOSIS — E66.811 CLASS 1 OBESITY WITH SERIOUS COMORBIDITY AND BODY MASS INDEX (BMI) OF 34.0 TO 34.9 IN ADULT, UNSPECIFIED OBESITY TYPE: ICD-10-CM

## 2025-02-04 DIAGNOSIS — G47.33 OBSTRUCTIVE SLEEP APNEA: ICD-10-CM

## 2025-02-04 LAB
PATH REPORT.COMMENTS IMP SPEC: NORMAL
PATH REPORT.COMMENTS IMP SPEC: NORMAL
PATH REPORT.FINAL DX SPEC: NORMAL
PATH REPORT.GROSS SPEC: NORMAL
PATH REPORT.MICROSCOPIC SPEC OTHER STN: NORMAL
PATH REPORT.RELEVANT HX SPEC: NORMAL
PHOTO IMAGE: NORMAL

## 2025-02-05 NOTE — TELEPHONE ENCOUNTER
Nurse Triage SBAR    Is this a 2nd Level Triage? YES, LICENSED PRACTITIONER REVIEW IS REQUIRED    Situation: Bilateral leg swelling    Background: Patient has historical left knee swelling, bilateral swelling began 2 weeks ago    Assessment: The patient wrote in to report bilateral leg swelling and to request a recommendation for a weight management clinic. When called, the patient states that her bilateral swelling began two weeks ago and both begin below the knee. When questioned, she denies any pain, redness, tenderness to the touch, fever, different coloration between legs, or difficulty breathing.     She does however note that her left leg is more swollen than her right. Given this, per the triage disposition it is recommended the patient be seen for evaluation, but she would like for her PCP to review the information gathered to send back recommendations. Routing to PCP for review. We discussed when the patient should be seen urgently and she verbalized understanding.     Protocol Recommended Disposition:   Go To Office Now    Recommendation: Go to Office Now     Routed to provider    Does the patient meet one of the following criteria for ADS visit consideration? 16+ years old, with an [facility] PCP     TIP  Providers, please consider if this condition is appropriate for management at one of our Acute and Diagnostic Services sites.     If patient is a good candidate, please use dotphrase <dot>triageresponse and select Refer to ADS to document.    Reason for Disposition   Thigh, calf, or ankle swelling in both legs, but one side is definitely more swollen (Exception: Longstanding difference between legs.)    Additional Information   Negative: Sounds like a life-threatening emergency to the triager   Negative: Chest pain   Negative: Followed an insect bite and has localized swelling (e.g., small area of puffy or swollen skin)   Negative: Followed a knee injury   Negative: Ankle injury   Negative: Pregnant with leg  swelling or edema   Negative: Difficulty breathing at rest   Negative: Entire foot is cool or blue in comparison to other side   Negative: Cast on leg or ankle and has increasing pain   Negative: Can't walk or can barely stand (new-onset)   Negative: Fever and red area (or area very tender to touch)   Negative: Patient sounds very sick or weak to the triager   Negative: SEVERE swelling (e.g., swelling extends above knee, entire leg is swollen, weeping fluid)   Negative: Pregnant 20 or more weeks and sudden weight gain (i.e., > 2 lbs, 1 kg in one week)   Negative: Thigh, calf, or ankle swelling in only one leg   Negative: Thigh or calf pain and only 1 side and present > 1 hour    Protocols used: Leg Swelling and Edema-A-OH    James Millard RN  [facility]

## 2025-02-05 NOTE — TELEPHONE ENCOUNTER
Bilateral swelling is probably ok to watch, but yes, she should consider compression socks.     With the history and if the left leg is much worse than the right, we could consider having her do an ultrasound of her legs, this may be a good ADS referral?     For weight management, we have a good clinic here that I can put a referral in for if she'd like.

## 2025-02-05 NOTE — TELEPHONE ENCOUNTER
Spoke with patient to relay provider message. She would like the weight management clinic referral and it was explained how the referral process works. She verbalized understanding of this. She otherwise will obtain compression socks and denies ADS at this time, but will call back if her left leg becomes an issue for her to be seen in ADS.     James Millard RN  [facility]

## 2025-02-19 ENCOUNTER — DOCUMENTATION ONLY (OUTPATIENT)
Dept: SLEEP MEDICINE | Facility: CLINIC | Age: 40
End: 2025-02-19
Payer: COMMERCIAL

## 2025-02-19 DIAGNOSIS — G47.33 OBSTRUCTIVE SLEEP APNEA (ADULT) (PEDIATRIC): Primary | ICD-10-CM

## 2025-03-05 ENCOUNTER — TELEPHONE (OUTPATIENT)
Dept: SLEEP MEDICINE | Facility: CLINIC | Age: 40
End: 2025-03-05

## 2025-03-05 NOTE — TELEPHONE ENCOUNTER
Patient needs to be rescheduled for their virtual visit due to Reason for Reschedule: Patient Request    Scheduling team, please refer to service line late cancellation/no-show policies and reach out to patient at a later date for rescheduling.    Appointment mode: Video  Provider: nahun

## 2025-05-12 ENCOUNTER — OFFICE VISIT (OUTPATIENT)
Dept: FAMILY MEDICINE | Facility: CLINIC | Age: 40
End: 2025-05-12
Payer: COMMERCIAL

## 2025-05-12 VITALS
SYSTOLIC BLOOD PRESSURE: 132 MMHG | HEART RATE: 73 BPM | BODY MASS INDEX: 37.28 KG/M2 | WEIGHT: 246 LBS | TEMPERATURE: 97.7 F | DIASTOLIC BLOOD PRESSURE: 86 MMHG | RESPIRATION RATE: 16 BRPM | HEIGHT: 68 IN | OXYGEN SATURATION: 98 %

## 2025-05-12 DIAGNOSIS — E66.812 CLASS 2 SEVERE OBESITY DUE TO EXCESS CALORIES WITH SERIOUS COMORBIDITY AND BODY MASS INDEX (BMI) OF 37.0 TO 37.9 IN ADULT (H): ICD-10-CM

## 2025-05-12 DIAGNOSIS — I83.11 VARICOSE VEINS OF BOTH LOWER EXTREMITIES WITH INFLAMMATION: ICD-10-CM

## 2025-05-12 DIAGNOSIS — E66.01 CLASS 2 SEVERE OBESITY DUE TO EXCESS CALORIES WITH SERIOUS COMORBIDITY AND BODY MASS INDEX (BMI) OF 37.0 TO 37.9 IN ADULT (H): ICD-10-CM

## 2025-05-12 DIAGNOSIS — I89.0 LYMPHEDEMA: Primary | ICD-10-CM

## 2025-05-12 DIAGNOSIS — G47.33 OBSTRUCTIVE SLEEP APNEA: ICD-10-CM

## 2025-05-12 DIAGNOSIS — N95.1 PERIMENOPAUSAL: ICD-10-CM

## 2025-05-12 DIAGNOSIS — I83.12 VARICOSE VEINS OF BOTH LOWER EXTREMITIES WITH INFLAMMATION: ICD-10-CM

## 2025-05-12 PROCEDURE — G2211 COMPLEX E/M VISIT ADD ON: HCPCS | Performed by: FAMILY MEDICINE

## 2025-05-12 PROCEDURE — 99214 OFFICE O/P EST MOD 30 MIN: CPT | Performed by: FAMILY MEDICINE

## 2025-05-12 PROCEDURE — 3079F DIAST BP 80-89 MM HG: CPT | Performed by: FAMILY MEDICINE

## 2025-05-12 PROCEDURE — 1125F AMNT PAIN NOTED PAIN PRSNT: CPT | Performed by: FAMILY MEDICINE

## 2025-05-12 PROCEDURE — 3075F SYST BP GE 130 - 139MM HG: CPT | Performed by: FAMILY MEDICINE

## 2025-05-12 RX ORDER — FUROSEMIDE 20 MG/1
20 TABLET ORAL DAILY PRN
Qty: 90 TABLET | Refills: 0 | Status: SHIPPED | OUTPATIENT
Start: 2025-05-12

## 2025-05-12 ASSESSMENT — PATIENT HEALTH QUESTIONNAIRE - PHQ9
10. IF YOU CHECKED OFF ANY PROBLEMS, HOW DIFFICULT HAVE THESE PROBLEMS MADE IT FOR YOU TO DO YOUR WORK, TAKE CARE OF THINGS AT HOME, OR GET ALONG WITH OTHER PEOPLE: SOMEWHAT DIFFICULT
5. POOR APPETITE OR OVEREATING: NEARLY EVERY DAY
SUM OF ALL RESPONSES TO PHQ QUESTIONS 1-9: 12
SUM OF ALL RESPONSES TO PHQ QUESTIONS 1-9: 12

## 2025-05-12 ASSESSMENT — ANXIETY QUESTIONNAIRES
1. FEELING NERVOUS, ANXIOUS, OR ON EDGE: NEARLY EVERY DAY
5. BEING SO RESTLESS THAT IT IS HARD TO SIT STILL: MORE THAN HALF THE DAYS
IF YOU CHECKED OFF ANY PROBLEMS ON THIS QUESTIONNAIRE, HOW DIFFICULT HAVE THESE PROBLEMS MADE IT FOR YOU TO DO YOUR WORK, TAKE CARE OF THINGS AT HOME, OR GET ALONG WITH OTHER PEOPLE: NOT DIFFICULT AT ALL
2. NOT BEING ABLE TO STOP OR CONTROL WORRYING: MORE THAN HALF THE DAYS
6. BECOMING EASILY ANNOYED OR IRRITABLE: MORE THAN HALF THE DAYS
GAD7 TOTAL SCORE: 16
GAD7 TOTAL SCORE: 16
7. FEELING AFRAID AS IF SOMETHING AWFUL MIGHT HAPPEN: MORE THAN HALF THE DAYS
3. WORRYING TOO MUCH ABOUT DIFFERENT THINGS: MORE THAN HALF THE DAYS

## 2025-05-12 ASSESSMENT — PAIN SCALES - GENERAL: PAINLEVEL_OUTOF10: MODERATE PAIN (4)

## 2025-05-12 NOTE — PROGRESS NOTES
Assessment & Plan     Lymphedema  Early lymphedema with history of varicosities.  She is to do conservative treatment and she could take furosemide 20 mg once a day as needed.  She would not need to take this every day if she was going to be out and about.  - furosemide (LASIX) 20 MG tablet  Dispense: 90 tablet; Refill: 0    Varicose veins of both lower extremities with inflammation  Has history of bilateral vein stripping with previously painful varicosities.  Does have compression stockings to wear.    Perimenopausal  Patient is perimenopausal at this time and hormone fluctuations are probably not helpful for her leg edema.    Obstructive sleep apnea  Patient tells me she has been on CPAP for a long time already.  This along with her obesity could help her have insurance pay for a GLP-1.  She will need to look into this.    Class 2 severe obesity due to excess calories with serious comorbidity and body mass index (BMI) of 37.0 to 37.9 in adult (H)  Current BMI is 37 with a weight of 249 down 3 pounds from her last visit in January with Dr. Jefferson for her annual physical.    She will drink lots of water, do lots of walking, eat healthy and use her water pill once daily as needed.    The longitudinal plan of care for the diagnosis(es)/condition(s) as documented were addressed during this visit. Due to the added complexity in care, I will continue to support Diane in the subsequent management and with ongoing continuity of care.      Follow-up   She should consider follow-up with Dr. Jefferson to discuss GLP 1 use.      Subjective   Diane is a 40 year old, presenting for the following health issues:  Swelling (Pain and swelling in legs sx on going for a while, comes and goes , worse the last few weeks, )        5/12/2025     8:18 AM   Additional Questions   Roomed by claudio carter cma     History of Present Illness       Reason for visit:  Leg pain and swelling  Symptom onset:  More than a month  Symptoms include:  Pain  "and swelling  Symptom intensity:  Moderate  Symptom progression:  Staying the same  Had these symptoms before:  Yes  Has tried/received treatment for these symptoms:  No   She is taking medications regularly.      This is a patient of Dr. Jefferson's who last saw her in January for a full physical.  She has history of varicose veins and obesity.  She has family history for mom with lymphedema.  She tells me she has had left knee surgeries and vein stripping bilaterally.  She has worn compression stockings in the past.  She is noting that she is getting more edema in her legs and sometimes it is as high as the hip area.  Mostly it is at the ankles and maybe is up to the knee.  She said it can be rather random, not necessarily better in the morning.  We discussed ways to improve the amount of edema she experiences.  We discussed decrease in salt intake, increase in exercise, decrease in weight will all help.  We discussed that being perimenopausal is probably not helpful and may have worsened her symptoms as well.  She has a history of obstructive sleep apnea and has worn a CPAP for a while she tells me.  We discussed the GLP-1 medications that have been FDA approved for moderate to severe AZALEA with obesity.  She should consider the use of this type of medication as people have had great success with weight loss.  I would think her insurance would have to cover at least partially for the medication.  She could look into that with her insurance.      Objective    /86 (BP Location: Left arm, Patient Position: Sitting, Cuff Size: Adult Regular)   Pulse 73   Temp 97.7  F (36.5  C)   Resp 16   Ht 1.734 m (5' 8.25\")   Wt 111.6 kg (246 lb)   LMP 04/30/2025 (Approximate)   SpO2 98%   BMI 37.13 kg/m    Body mass index is 37.13 kg/m .  Physical Exam   GENERAL: alert, no distress, and obese  RESP: lungs clear to auscultation - no rales, rhonchi or wheezes  CV: regular rates and rhythm  ABDOMEN: soft, nontender  MS: " Trace nonpitting or minimally pitting edema to mid calf            Signed Electronically by: Jewell Lynn MD

## 2025-06-19 ENCOUNTER — OFFICE VISIT (OUTPATIENT)
Dept: ENDOCRINOLOGY | Facility: CLINIC | Age: 40
End: 2025-06-19
Payer: COMMERCIAL

## 2025-06-19 VITALS
DIASTOLIC BLOOD PRESSURE: 89 MMHG | OXYGEN SATURATION: 96 % | HEART RATE: 67 BPM | SYSTOLIC BLOOD PRESSURE: 140 MMHG | BODY MASS INDEX: 36.89 KG/M2 | WEIGHT: 243.4 LBS | HEIGHT: 68 IN

## 2025-06-19 DIAGNOSIS — F41.1 ANXIETY STATE: Primary | ICD-10-CM

## 2025-06-19 DIAGNOSIS — R63.2 BINGE EATING: ICD-10-CM

## 2025-06-19 DIAGNOSIS — E66.812 CLASS 2 SEVERE OBESITY DUE TO EXCESS CALORIES WITH SERIOUS COMORBIDITY AND BODY MASS INDEX (BMI) OF 37.0 TO 37.9 IN ADULT (H): ICD-10-CM

## 2025-06-19 DIAGNOSIS — E66.01 CLASS 2 SEVERE OBESITY DUE TO EXCESS CALORIES WITH SERIOUS COMORBIDITY AND BODY MASS INDEX (BMI) OF 37.0 TO 37.9 IN ADULT (H): ICD-10-CM

## 2025-06-19 DIAGNOSIS — I10 BENIGN ESSENTIAL HYPERTENSION: ICD-10-CM

## 2025-06-19 RX ORDER — TOPIRAMATE 25 MG/1
25 TABLET, FILM COATED ORAL DAILY
Qty: 90 TABLET | Refills: 1 | Status: SHIPPED | OUTPATIENT
Start: 2025-06-19

## 2025-06-19 RX ORDER — HYDROCHLOROTHIAZIDE 25 MG/1
25 TABLET ORAL DAILY
Qty: 60 TABLET | Refills: 1 | Status: SHIPPED | OUTPATIENT
Start: 2025-06-19

## 2025-06-19 ASSESSMENT — PAIN SCALES - GENERAL: PAINLEVEL_OUTOF10: NO PAIN (0)

## 2025-06-19 NOTE — PROGRESS NOTES
New Medical Weight Management Consult    PATIENT:  Diane Costa  MRN:         [id number]  :         1985  ALISHA:         2025    Dear Isabel Jefferson,    I had the pleasure of seeing your patient, Diane Costa.  Full intake/assessment done to determine barriers to weight loss success and develop a treatment plan.  Diane Csota is a 40 year old female interested in treatment of medical problems associated with weight.  Her weight today is 243 lbs 6.4 oz, Body mass index is 37.01 kg/m ., and she has the following co-morbidities:      2025     9:01 PM   --   I have the following health issues associated with obesity High Blood Pressure    Sleep Apnea    GERD (Reflux)    Fatty Liver   I have the following symptoms associated with obesity Knee Pain    Depression    Lower Extremity Swelling    Back Pain    Fatigue    Groin Rash    Hip Pain     Ms Costa has been to this clinic before, todayh is my first time meeting her. She recently moved out of a relationship so has been dealing with many changes. Is willing to see behavioral health psychology here. Does experience binge episodes about 4 times per week. Does not vomit. Has been through [facility] in the past for binge eating.     Sleep: Now getting 7 hours of sleep per night, has been working on this. Does not have screen use at night. Has AZALEA.     Periods of hunger during the day: always hungry. Snacks most of the day. Don't often feel full.       2025     9:01 PM   Mental Health History Reviewed With Patient   Have you ever been physically or sexually abused? No   How often in the past 2 weeks have you felt little interest or pleasure in doing things? Nearly Everyday   Over the past 2 weeks how often have you felt down, depressed, or hopeless? More Than Half the Days     Exercise: outside with kids ; 6,000/ day       2025     9:01 PM   Referring Provider   Please name the provider who referred you to  "Medical Weight Management  If you do not know, please answer \"I Don't Know\" N/A     Wt Readings from Last 4 Encounters:   06/19/25 110.4 kg (243 lb 6.4 oz)   05/12/25 111.6 kg (246 lb)   01/22/25 112.9 kg (249 lb)   09/20/24 107.9 kg (237 lb 12.8 oz)         6/18/2025     9:01 PM   Weight History   How concerned are you about your weight? Very Concerned   I became overweight As a Child   The following factors have contributed to my weight gain Mental Health Issues    Change in Schedule    A Health Crisis    Eating Wrong Types of Food    Eating Too Much    Lack of Exercise    Genetic (Runs in the Family)    Stress   I have tried the following methods to lose weight Watching Portions or Calories    Exercise    Weight Watchers    Atkins-type Diet (Low Carb/High Protein)    Slim Fast or Other Liquid Diets    Medications    Meal Replacements    Fasting   My lowest weight since age 18 was 170   My highest weight since age 18 was 245   The most weight I have ever lost was (lbs) 15   I have the following family history of obesity/being overweight My mother is overweight    One or more of my siblings are overweight    Many of my relatives are overweight   How has your weight changed over the last year? Gained   How many pounds? 25         6/18/2025     9:01 PM   Diet Recall   Glass juice/day 0   Glass milk/day 1   Glass sugary drink/day 0   How many cans/bottles of sugar pop/soda/tea/sports drinks do you drink in a day? 3   Alcohol Monthly or Less         6/18/2025     9:01 PM   Eating Habits   Generally, my meals include foods like these bread, pasta, rice, potatoes, corn, crackers, sweet dessert, pop, or juice Everyday   Generally, my meals include foods like these fried meats, brats, burgers, french fries, pizza, cheese, chips, or ice cream A Few Times a Week   Eat fast food (like [employer], [employer], [employer]) Less Than Weekly   Eat at a buffet or sit-down restaurant Less Than Weekly   Eat most of my meals in front " of the TV or computer A Few Times a Week   Often skip meals, eat at random times, have no regular eating times Almost Everyday   Rarely sit down for a meal but snack or graze throughout Almost Everyday   Eat extra snacks between meals Almost Everyday   Eat most of my food at the end of the day Almost Everyday   Eat in the middle of the night or wake up at night to eat Never   Eat extra snacks to prevent or correct low blood sugar Never   Eat to prevent acid reflux or stomach pain Never   Worry about not having enough food to eat Never   I eat when I am depressed Almost Everyday   I eat when I am stressed Almost Everyday   I eat when I am bored Almost Everyday   I eat when I am anxious Almost Everyday   I eat when I am happy or as a reward A Few Times a Week   I feel hungry all the time even if I just have eaten Almost Everyday   Feeling full is important to me Never   I finish all the food on my plate even if I am already full Less Than Weekly   I can't resist eating delicious food or walk past the good food/smell Almost Everyday   I eat/snack without noticing that I am eating Everyday   I eat when I am preparing the meal Almost Everyday   I eat more than usual when I see others eating Less Than Weekly   I have trouble not eating sweets, ice cream, cookies, or chips if they are around the house Everyday   I think about food all day Everyday   What foods, if any, do you crave? Sweets/Candy/Chocolate   Please list any other foods you crave? Sweets, chips, soda           6/18/2025     9:01 PM   Activity/Exercise History   How much of a typical 12 hour day do you spend sitting? Most of the Day   How much of a typical 12 hour day do you spend lying down? Less Than Half the Day   How much of a typical day do you spend walking/standing? Less Than Half the Day   How many hours (not including work) do you spend on the TV/Video Games/Computer/Tablet/Phone? 2-3 Hours   How many times a week are you active for the purpose of  exercise? Once a Week   What keeps you from being more active? Pain    Lack of Time    Too tired    Unsure What To Do    Worried People Will Look At Me   How many total minutes do you spend doing some activity for the purpose of exercising when you exercise? 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    Fatty liver     hx of transaminase elevations 2020.    GERD (gastroesophageal reflux disease)     controlled w/ Pepcid    History of blood transfusion reaction     has antibodies.    History of transfusion     IBS (irritable bowel syndrome)     Non-specified     Left knee pain     hx of patellar ligament repair (cadaver)    Mental disorder     history of anxiety    Mild major depression 06/04/2021    AZALEA (obstructive sleep apnea)     CPAP    Pregnancy-induced hypertension in third trimester 03/27/2018    Vertebral artery dissection 02/2020    left, on lifelong baby ASA 81mg now.     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 100%   How many hours do you spend commuting to work daily? 10 minutes   If you have children, are they overweight? Yes   Who do you live with? My Parents 100% of the time, my children 50% of the time   Who does the food shopping? All three adults in the household           6/18/2025      "9:01 PM   Sleep History Reviewed With Patient   How many hours do you sleep at night? 7       MEDICATIONS:   Current Outpatient Medications   Medication Sig Dispense Refill    aspirin (ASA) 81 MG chewable tablet Take 81 mg by mouth      cholecalciferol 50 MCG (2000 UT) CAPS Take 1 tablet by mouth daily      furosemide (LASIX) 20 MG tablet Take 1 tablet (20 mg) by mouth daily as needed (leg edema). 90 tablet 0    hydrochlorothiazide (HYDRODIURIL) 25 MG tablet Take 1 tablet (25 mg) by mouth daily. 60 tablet 1    topiramate (TOPAMAX) 25 MG tablet Take 1 tablet (25 mg) by mouth daily. 90 tablet 1       ALLERGIES:   Allergies   Allergen Reactions    Oxycodone Nausea     Weakness, seen in ER for this    Percocet [Oxycodone-Acetaminophen] Nausea and Vomiting    Blood-Group Specific Substance Unknown     History of anti-E and anti-Cw.  Expect delays in obtaining blood for transfusion.     Oxycodone-Acetaminophen Unknown       PHYSICAL EXAM:  BP (!) 140/89 (BP Location: Left arm, Patient Position: Chair, Cuff Size: Adult Regular)   Pulse 67   Ht 1.727 m (5' 8\")   Wt 110.4 kg (243 lb 6.4 oz)   LMP 04/30/2025 (Approximate)   SpO2 96%   BMI 37.01 kg/m     A & O x 3  HEENT: NCAT, mucous membranes moist  Respirations unlabored  Location of obesity: Central Obesity    LABS:  Hemoglobin A1C   Date Value Ref Range Status   05/27/2022 5.2 0.0 - 5.6 % Final     Comment:     Normal <5.7%   Prediabetes 5.7-6.4%    Diabetes 6.5% or higher     Note: Adopted from ADA consensus guidelines.   04/25/2017 5.1 3.5 - 6.0 % Final     Cholesterol   Date Value Ref Range Status   09/20/2024 170 <200 mg/dL Final     TSH   Date Value Ref Range Status   10/13/2023 1.88 0.30 - 4.20 uIU/mL Final   01/27/2022 1.39 0.30 - 5.00 uIU/mL Final     Creatinine   Date Value Ref Range Status   09/20/2024 0.72 0.51 - 0.95 mg/dL Final     ALT   Date Value Ref Range Status   09/20/2024 34 0 - 50 U/L Final       ASSESSMENT:  Ms. Costa is a 40 year old " female with history of The primary encounter diagnosis was Anxiety state. Diagnoses of Class 2 severe obesity due to excess calories with serious comorbidity and body mass index (BMI) of 37.0 to 37.9 in adult (H), Benign essential hypertension, and Binge eating were also pertinent to this visit. and Class 2 obesity with current body mass index is 37.01 kg/m . and with current health consequences who presents for an initial weight management consultation.     PLAN:    No problems updated.     Orders Placed This Encounter   Procedures    Adult Mental Health  Referral       With motivational interviewing, Diane took part in making the following plan for their Class 2 Obesity:   Patient Instructions   Nice to meet you!    Topiramate: take 1 tablet by mouth in the evening  - after 2 weeks, take 2 tablets by mouth daily     Hydrochlorothiazide:  - restart hydrochlorothiazide for blood pressure  - go in for  BP check in about 2 weeks  - send me a mychart   - any BP management beyond this, go back to PCP    Mental health - you can make an appointment     Vyvanse: if/when we start this, I like to start a low dose and titrate up      TOPIRAMATE (TOPAMAX)    We are considering starting topiramate as part of your weight management plan. This medication is often used to treat migraines or seizures but has also been found to help with weight loss. While it is not FDA-approved for weight loss on its own, it has been safely used off-label for this purpose for many years.    How topiramate works:  Topiramate is believed to affect certain brain pathways that influence appetite, cravings, and eating behaviors. Many people on topiramate feel less interested in food between meals, have fewer cravings (especially for sugary drinks like soda), and find it easier to stop eating when full. Some people notice these effects right away. Others may not feel much different but still lose weight over time. Like all weight loss  medications, topiramate works best when you support it with healthy habits.    Tips for taking topiramate:   Always take topiramate exactly as prescribed.   Do not stop the medication abruptly without talking to your provider.   Drink plenty of water throughout the day to prevent kidney stones, a known but rare side effect.   If prescribed as once daily dosing, ensure to take in the evening to help with potential daytime drowsiness.    Tips to help topiramate work best:  Keep fewer tempting high-calorie foods in the house or office.  Choose lower-calorie foods like fruits, vegetables, and lean proteins for snacks.  Eat out only one time or less each week  Eat meals at a table without distractions like the TV or computer.    Common Side Effects:  Topiramate is generally well tolerated. Common side effects may include: Tingling in the hands, feet, or face (usually temporary and not bothersome), word-finding difficulty or mental fogginess (affects about 1 in 10 people), feeling sleepy, groggy, or  off  when starting the medication (usually goes away within days), dry mouth or changes in taste. Let your provider know if any side effects become bothersome.    Rare Serious Side Effects:   Call us or seek care if you experience:  Vision changes or eye pain (this could be a rare sign of increased eye pressure), severe mood changes or depression, kidney stones (severe pain in the side or back, blood in urine), signs of an allergic reaction (rash, swelling, difficulty breathing), or confusion or severe drowsiness    Important for Women of Childbearing Age:  Topiramate may increase the risk of birth defects, especially cleft lip or palate. If you are of childbearing age, you must use effective birth control or avoid pregnancy while taking this medication. Talk to your provider about a plan that s right for you.      For any questions or concerns please send a flaveit message to our team or call our weight management call  center at [phone] during regular business hours. For questions during evenings or weekends your messages will be addressed during the next business day.  For emergencies please call 911 or seek immediate medical care.      In order to get refills of this or any medication we prescribe you must be seen in the medical weight mgmt clinic every 2-3 months.       RTC:    8 weeks.    TIME: 40 min spent on evaluation, management, counseling, education, & motivational interviewing with greater than 50 % of the total time was spent on counseling and coordinating care    Sincerely,    Fay Cleveland MD  Diplomat, American Board of Obesity Medicine

## 2025-06-19 NOTE — NURSING NOTE
"Chief Complaint   Patient presents with    New Patient     [facility]       Vitals:    06/19/25 1251   BP: (!) 140/89   BP Location: Left arm   Patient Position: Chair   Cuff Size: Adult Regular   Pulse: 67   SpO2: 96%   Weight: 110.4 kg (243 lb 6.4 oz)   Height: 1.727 m (5' 8\")       Body mass index is 37.01 kg/m .                           "

## 2025-06-19 NOTE — PATIENT INSTRUCTIONS
Nice to meet you!    Topiramate: take 1 tablet by mouth in the evening  - after 2 weeks, take 2 tablets by mouth daily     Hydrochlorothiazide:  - restart hydrochlorothiazide for blood pressure  - go in for  BP check in about 2 weeks  - send me a mychart   - any BP management beyond this, go back to PCP    Mental health - you can make an appointment     Vyvanse: if/when we start this, I like to start a low dose and titrate up      TOPIRAMATE (TOPAMAX)    We are considering starting topiramate as part of your weight management plan. This medication is often used to treat migraines or seizures but has also been found to help with weight loss. While it is not FDA-approved for weight loss on its own, it has been safely used off-label for this purpose for many years.    How topiramate works:  Topiramate is believed to affect certain brain pathways that influence appetite, cravings, and eating behaviors. Many people on topiramate feel less interested in food between meals, have fewer cravings (especially for sugary drinks like soda), and find it easier to stop eating when full. Some people notice these effects right away. Others may not feel much different but still lose weight over time. Like all weight loss medications, topiramate works best when you support it with healthy habits.    Tips for taking topiramate:   Always take topiramate exactly as prescribed.   Do not stop the medication abruptly without talking to your provider.   Drink plenty of water throughout the day to prevent kidney stones, a known but rare side effect.   If prescribed as once daily dosing, ensure to take in the evening to help with potential daytime drowsiness.    Tips to help topiramate work best:  Keep fewer tempting high-calorie foods in the house or office.  Choose lower-calorie foods like fruits, vegetables, and lean proteins for snacks.  Eat out only one time or less each week  Eat meals at a table without distractions like the TV or  computer.    Common Side Effects:  Topiramate is generally well tolerated. Common side effects may include: Tingling in the hands, feet, or face (usually temporary and not bothersome), word-finding difficulty or mental fogginess (affects about 1 in 10 people), feeling sleepy, groggy, or  off  when starting the medication (usually goes away within days), dry mouth or changes in taste. Let your provider know if any side effects become bothersome.    Rare Serious Side Effects:   Call us or seek care if you experience:  Vision changes or eye pain (this could be a rare sign of increased eye pressure), severe mood changes or depression, kidney stones (severe pain in the side or back, blood in urine), signs of an allergic reaction (rash, swelling, difficulty breathing), or confusion or severe drowsiness    Important for Women of Childbearing Age:  Topiramate may increase the risk of birth defects, especially cleft lip or palate. If you are of childbearing age, you must use effective birth control or avoid pregnancy while taking this medication. Talk to your provider about a plan that s right for you.      For any questions or concerns please send a LetsWombat message to our team or call our weight management call center at [phone] during regular business hours. For questions during evenings or weekends your messages will be addressed during the next business day.  For emergencies please call 911 or seek immediate medical care.      In order to get refills of this or any medication we prescribe you must be seen in the medical weight mgmt clinic every 2-3 months.

## 2025-06-19 NOTE — LETTER
2025       RE: Diane Costa  [address]  [place] [address]     Dear Colleague,    Thank you for referring your patient, Diane Costa, to the [facility] WE[facility]NAG[facility] [place] [facility]. Please see a copy of my visit note below.    New Medical Weight Management Consult    PATIENT:  Diane Costa  MRN:         [id number]  :         1985  ALISHA:         2025    Dear Isabel Jefferson,    I had the pleasure of seeing your patient, Diane Costa.  Full intake/assessment done to determine barriers to weight loss success and develop a treatment plan.  Diane Costa is a 40 year old female interested in treatment of medical problems associated with weight.  Her weight today is 243 lbs 6.4 oz, Body mass index is 37.01 kg/m ., and she has the following co-morbidities:      2025     9:01 PM   --   I have the following health issues associated with obesity High Blood Pressure    Sleep Apnea    GERD (Reflux)    Fatty Liver   I have the following symptoms associated with obesity Knee Pain    Depression    Lower Extremity Swelling    Back Pain    Fatigue    Groin Rash    Hip Pain     Ms Costa has been to this clinic before, todayh is my first time meeting her. She recently moved out of a relationship so has been dealing with many changes. Is willing to see behavioral health psychology here. Does experience binge episodes about 4 times per week. Does not vomit. Has been through [facility] in the past for binge eating.     Sleep: Now getting 7 hours of sleep per night, has been working on this. Does not have screen use at night. Has AZALEA.     Periods of hunger during the day: always hungry. Snacks most of the day. Don't often feel full.       2025     9:01 PM   Mental Health History Reviewed With Patient   Have you ever been physically or sexually abused? No   How often in the  "past 2 weeks have you felt little interest or pleasure in doing things? Nearly Everyday   Over the past 2 weeks how often have you felt down, depressed, or hopeless? More Than Half the Days     Exercise: outside with kids ; 6,000/ day       6/18/2025     9:01 PM   Referring Provider   Please name the provider who referred you to Medical Weight Management  If you do not know, please answer \"I Don't Know\" N/A     Wt Readings from Last 4 Encounters:   06/19/25 110.4 kg (243 lb 6.4 oz)   05/12/25 111.6 kg (246 lb)   01/22/25 112.9 kg (249 lb)   09/20/24 107.9 kg (237 lb 12.8 oz)         6/18/2025     9:01 PM   Weight History   How concerned are you about your weight? Very Concerned   I became overweight As a Child   The following factors have contributed to my weight gain Mental Health Issues    Change in Schedule    A Health Crisis    Eating Wrong Types of Food    Eating Too Much    Lack of Exercise    Genetic (Runs in the Family)    Stress   I have tried the following methods to lose weight Watching Portions or Calories    Exercise    Weight Watchers    Atkins-type Diet (Low Carb/High Protein)    Slim Fast or Other Liquid Diets    Medications    Meal Replacements    Fasting   My lowest weight since age 18 was 170   My highest weight since age 18 was 245   The most weight I have ever lost was (lbs) 15   I have the following family history of obesity/being overweight My mother is overweight    One or more of my siblings are overweight    Many of my relatives are overweight   How has your weight changed over the last year? Gained   How many pounds? 25         6/18/2025     9:01 PM   Diet Recall   Glass juice/day 0   Glass milk/day 1   Glass sugary drink/day 0   How many cans/bottles of sugar pop/soda/tea/sports drinks do you drink in a day? 3   Alcohol Monthly or Less         6/18/2025     9:01 PM   Eating Habits   Generally, my meals include foods like these bread, pasta, rice, potatoes, corn, crackers, sweet dessert, " pop, or juice Everyday   Generally, my meals include foods like these fried meats, brats, burgers, french fries, pizza, cheese, chips, or ice cream A Few Times a Week   Eat fast food (like McDonalds, Burger [employer]ben, Taco Bell) Less Than Weekly   Eat at a buffet or sit-down restaurant Less Than Weekly   Eat most of my meals in front of the TV or computer A Few Times a Week   Often skip meals, eat at random times, have no regular eating times Almost Everyday   Rarely sit down for a meal but snack or graze throughout Almost Everyday   Eat extra snacks between meals Almost Everyday   Eat most of my food at the end of the day Almost Everyday   Eat in the middle of the night or wake up at night to eat Never   Eat extra snacks to prevent or correct low blood sugar Never   Eat to prevent acid reflux or stomach pain Never   Worry about not having enough food to eat Never   I eat when I am depressed Almost Everyday   I eat when I am stressed Almost Everyday   I eat when I am bored Almost Everyday   I eat when I am anxious Almost Everyday   I eat when I am happy or as a reward A Few Times a Week   I feel hungry all the time even if I just have eaten Almost Everyday   Feeling full is important to me Never   I finish all the food on my plate even if I am already full Less Than Weekly   I can't resist eating delicious food or walk past the good food/smell Almost Everyday   I eat/snack without noticing that I am eating Everyday   I eat when I am preparing the meal Almost Everyday   I eat more than usual when I see others eating Less Than Weekly   I have trouble not eating sweets, ice cream, cookies, or chips if they are around the house Everyday   I think about food all day Everyday   What foods, if any, do you crave? Sweets/Candy/Chocolate   Please list any other foods you crave? Sweets, chips, soda           6/18/2025     9:01 PM   Activity/Exercise History   How much of a typical 12 hour day do you spend sitting? Most of the Day    How much of a typical 12 hour day do you spend lying down? Less Than Half the Day   How much of a typical day do you spend walking/standing? Less Than Half the Day   How many hours (not including work) do you spend on the TV/Video Games/Computer/Tablet/Phone? 2-3 Hours   How many times a week are you active for the purpose of exercise? Once a Week   What keeps you from being more active? Pain    Lack of Time    Too tired    Unsure What To Do    Worried People Will Look At Me   How many total minutes do you spend doing some activity for the purpose of exercising when you exercise? 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     Fatty liver     hx of transaminase elevations 2020.     GERD (gastroesophageal reflux disease)     controlled w/ Pepcid     History of blood transfusion reaction     has antibodies.     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 100%   How many hours do you spend commuting to work daily? 10 minutes   If you have children, are they overweight? Yes   Who do you live with? My Parents 100% of the time, my children 50% of the time   Who does the food shopping? All three adults in the household           6/18/2025     9:01 PM   Sleep History Reviewed With Patient   How many hours do you sleep at night? 7       MEDICATIONS:   Current Outpatient Medications   Medication Sig Dispense Refill     aspirin (ASA) 81 MG chewable tablet Take 81 mg by mouth       cholecalciferol 50 MCG (2000 UT) CAPS Take 1 tablet by mouth daily       furosemide (LASIX) 20 MG tablet Take 1 tablet (20 mg) by mouth daily as needed (leg edema). 90 tablet 0     hydrochlorothiazide (HYDRODIURIL) 25 MG tablet Take 1 tablet (25 mg) by mouth daily. 60 tablet 1     topiramate (TOPAMAX) 25 MG tablet Take 1 tablet (25 mg) by mouth daily. 90 tablet 1       ALLERGIES:   Allergies   Allergen Reactions     Oxycodone Nausea     Weakness, seen in ER for this     Percocet [Oxycodone-Acetaminophen] Nausea and Vomiting     Blood-Group Specific Substance Unknown     History of anti-E and anti-Cw.  Expect delays in obtaining blood for transfusion.      Oxycodone-Acetaminophen Unknown       PHYSICAL EXAM:  BP (!) 140/89 (BP Location: Left arm, Patient Position: Chair, Cuff Size: Adult Regular)   Pulse 67   Ht 1.727 m (5' 8\")   Wt 110.4 kg (243 lb 6.4 oz)   LMP 04/30/2025 (Approximate)   SpO2 96%   BMI 37.01 kg/m     A & O x 3  HEENT: NCAT, mucous membranes moist  Respirations unlabored  Location of obesity: Central Obesity    LABS:  Hemoglobin A1C   Date Value Ref Range Status   05/27/2022 5.2 0.0 - 5.6 % Final     Comment:     Normal <5.7%   Prediabetes 5.7-6.4%    Diabetes 6.5% or higher     Note: Adopted from ADA consensus guidelines.   04/25/2017 5.1 3.5 - 6.0 % Final     Cholesterol   Date Value Ref Range Status   09/20/2024 170 <200 " mg/dL Final     TSH   Date Value Ref Range Status   10/13/2023 1.88 0.30 - 4.20 uIU/mL Final   01/27/2022 1.39 0.30 - 5.00 uIU/mL Final     Creatinine   Date Value Ref Range Status   09/20/2024 0.72 0.51 - 0.95 mg/dL Final     ALT   Date Value Ref Range Status   09/20/2024 34 0 - 50 U/L Final       ASSESSMENT:  Ms. Costa is a 40 year old female with history of The primary encounter diagnosis was Anxiety state. Diagnoses of Class 2 severe obesity due to excess calories with serious comorbidity and body mass index (BMI) of 37.0 to 37.9 in adult (H), Benign essential hypertension, and Binge eating were also pertinent to this visit. and Class 2 obesity with current body mass index is 37.01 kg/m . and with current health consequences who presents for an initial weight management consultation.     PLAN:    No problems updated.     Orders Placed This Encounter   Procedures     Adult Mental Health  Referral       With motivational interviewing, Diane took part in making the following plan for their Class 2 Obesity:   Patient Instructions   Nice to meet you!    Topiramate: take 1 tablet by mouth in the evening  - after 2 weeks, take 2 tablets by mouth daily     Hydrochlorothiazide:  - restart hydrochlorothiazide for blood pressure  - go in for  BP check in about 2 weeks  - send me a mychart   - any BP management beyond this, go back to PCP    Mental health - you can make an appointment     Vyvanse: if/when we start this, I like to start a low dose and titrate up      TOPIRAMATE (TOPAMAX)    We are considering starting topiramate as part of your weight management plan. This medication is often used to treat migraines or seizures but has also been found to help with weight loss. While it is not FDA-approved for weight loss on its own, it has been safely used off-label for this purpose for many years.    How topiramate works:  Topiramate is believed to affect certain brain pathways that influence appetite,  cravings, and eating behaviors. Many people on topiramate feel less interested in food between meals, have fewer cravings (especially for sugary drinks like soda), and find it easier to stop eating when full. Some people notice these effects right away. Others may not feel much different but still lose weight over time. Like all weight loss medications, topiramate works best when you support it with healthy habits.    Tips for taking topiramate:    Always take topiramate exactly as prescribed.    Do not stop the medication abruptly without talking to your provider.    Drink plenty of water throughout the day to prevent kidney stones, a known but rare side effect.    If prescribed as once daily dosing, ensure to take in the evening to help with potential daytime drowsiness.    Tips to help topiramate work best:  Keep fewer tempting high-calorie foods in the house or office.  Choose lower-calorie foods like fruits, vegetables, and lean proteins for snacks.  Eat out only one time or less each week  Eat meals at a table without distractions like the TV or computer.    Common Side Effects:  Topiramate is generally well tolerated. Common side effects may include: Tingling in the hands, feet, or face (usually temporary and not bothersome), word-finding difficulty or mental fogginess (affects about 1 in 10 people), feeling sleepy, groggy, or  off  when starting the medication (usually goes away within days), dry mouth or changes in taste. Let your provider know if any side effects become bothersome.    Rare Serious Side Effects:   Call us or seek care if you experience:  Vision changes or eye pain (this could be a rare sign of increased eye pressure), severe mood changes or depression, kidney stones (severe pain in the side or back, blood in urine), signs of an allergic reaction (rash, swelling, difficulty breathing), or confusion or severe drowsiness    Important for Women of Childbearing Age:  Topiramate may increase the  risk of birth defects, especially cleft lip or palate. If you are of childbearing age, you must use effective birth control or avoid pregnancy while taking this medication. Talk to your provider about a plan that s right for you.      For any questions or concerns please send a [employer] message to our team or call our weight management call center at [phone] during regular business hours. For questions during evenings or weekends your messages will be addressed during the next business day.  For emergencies please call 911 or seek immediate medical care.      In order to get refills of this or any medication we prescribe you must be seen in the medical weight mgmt clinic every 2-3 months.       RTC:    8 weeks.    TIME: 40 min spent on evaluation, management, counseling, education, & motivational interviewing with greater than 50 % of the total time was spent on counseling and coordinating care    Sincerely,    Fay Cleveland MD  Diplomat, American Board of Obesity Medicine           Again, thank you for allowing me to participate in the care of your patient.      Sincerely,    Fay Cleveland MD

## 2025-06-23 ENCOUNTER — PATIENT OUTREACH (OUTPATIENT)
Dept: CARE COORDINATION | Facility: CLINIC | Age: 40
End: 2025-06-23
Payer: COMMERCIAL

## 2025-07-16 ENCOUNTER — ALLIED HEALTH/NURSE VISIT (OUTPATIENT)
Dept: FAMILY MEDICINE | Facility: CLINIC | Age: 40
End: 2025-07-16
Payer: COMMERCIAL

## 2025-07-16 VITALS — SYSTOLIC BLOOD PRESSURE: 124 MMHG | HEART RATE: 88 BPM | DIASTOLIC BLOOD PRESSURE: 82 MMHG

## 2025-07-16 DIAGNOSIS — Z01.30 BP CHECK: Primary | ICD-10-CM

## 2025-07-16 NOTE — PROGRESS NOTES
Diane Costa is a 40 year old year old patient who comes in today for a Blood Pressure check because of new medication.  Vital Signs as repeated by /82 - 88  Patient is taking medication as prescribed  Patient is tolerating medications well.  Patient is not monitoring Blood Pressure at home.  Average readings if yes are N/A  Current complaints: intermittent tingling in fingers  Disposition:  patient to continue with the same medication    James Millard RN  [facility]

## 2025-08-01 ENCOUNTER — APPOINTMENT (OUTPATIENT)
Dept: URBAN - METROPOLITAN AREA CLINIC 260 | Age: 40
Setting detail: DERMATOLOGY
End: 2025-08-01

## 2025-08-01 DIAGNOSIS — D18.0 HEMANGIOMA: ICD-10-CM

## 2025-08-01 DIAGNOSIS — Z71.89 OTHER SPECIFIED COUNSELING: ICD-10-CM

## 2025-08-01 DIAGNOSIS — L81.4 OTHER MELANIN HYPERPIGMENTATION: ICD-10-CM

## 2025-08-01 DIAGNOSIS — L82.1 OTHER SEBORRHEIC KERATOSIS: ICD-10-CM

## 2025-08-01 DIAGNOSIS — D22 MELANOCYTIC NEVI: ICD-10-CM

## 2025-08-01 PROBLEM — D18.01 HEMANGIOMA OF SKIN AND SUBCUTANEOUS TISSUE: Status: ACTIVE | Noted: 2025-08-01

## 2025-08-01 PROBLEM — D22.5 MELANOCYTIC NEVI OF TRUNK: Status: ACTIVE | Noted: 2025-08-01

## 2025-08-01 PROCEDURE — OTHER MIPS QUALITY: OTHER

## 2025-08-01 PROCEDURE — 99213 OFFICE O/P EST LOW 20 MIN: CPT

## 2025-08-01 PROCEDURE — OTHER COUNSELING: OTHER

## 2025-08-01 ASSESSMENT — LOCATION SIMPLE DESCRIPTION DERM
LOCATION SIMPLE: UPPER BACK
LOCATION SIMPLE: LOWER BACK

## 2025-08-01 ASSESSMENT — LOCATION DETAILED DESCRIPTION DERM
LOCATION DETAILED: INFERIOR THORACIC SPINE
LOCATION DETAILED: SUPERIOR LUMBAR SPINE

## 2025-08-01 ASSESSMENT — LOCATION ZONE DERM: LOCATION ZONE: TRUNK

## 2025-08-04 ENCOUNTER — VIRTUAL VISIT (OUTPATIENT)
Dept: BEHAVIORAL HEALTH | Facility: CLINIC | Age: 40
End: 2025-08-04
Payer: COMMERCIAL

## 2025-08-04 DIAGNOSIS — F43.22 ADJUSTMENT DISORDER WITH ANXIETY: Primary | ICD-10-CM

## 2025-08-04 DIAGNOSIS — F41.1 ANXIETY STATE: ICD-10-CM

## 2025-08-04 PROCEDURE — 90832 PSYTX W PT 30 MINUTES: CPT | Mod: 95 | Performed by: SOCIAL WORKER

## 2025-08-04 ASSESSMENT — PATIENT HEALTH QUESTIONNAIRE - PHQ9
SUM OF ALL RESPONSES TO PHQ QUESTIONS 1-9: 18
SUM OF ALL RESPONSES TO PHQ QUESTIONS 1-9: 18
10. IF YOU CHECKED OFF ANY PROBLEMS, HOW DIFFICULT HAVE THESE PROBLEMS MADE IT FOR YOU TO DO YOUR WORK, TAKE CARE OF THINGS AT HOME, OR GET ALONG WITH OTHER PEOPLE: SOMEWHAT DIFFICULT

## 2025-08-05 ENCOUNTER — PATIENT OUTREACH (OUTPATIENT)
Dept: CARE COORDINATION | Facility: CLINIC | Age: 40
End: 2025-08-05

## 2025-08-07 ENCOUNTER — PATIENT OUTREACH (OUTPATIENT)
Dept: CARE COORDINATION | Facility: CLINIC | Age: 40
End: 2025-08-07
Payer: COMMERCIAL

## 2025-08-11 DIAGNOSIS — R63.2 BINGE EATING: ICD-10-CM

## 2025-08-11 RX ORDER — LISDEXAMFETAMINE DIMESYLATE 30 MG/1
30 CAPSULE ORAL EVERY MORNING
Qty: 14 CAPSULE | Refills: 0 | Status: SHIPPED | OUTPATIENT
Start: 2025-08-21

## 2025-08-17 ENCOUNTER — MYC REFILL (OUTPATIENT)
Dept: ENDOCRINOLOGY | Facility: CLINIC | Age: 40
End: 2025-08-17
Payer: COMMERCIAL

## 2025-08-17 DIAGNOSIS — R63.2 BINGE EATING: ICD-10-CM

## 2025-08-21 RX ORDER — TOPIRAMATE 25 MG/1
25 TABLET, FILM COATED ORAL DAILY
Qty: 90 TABLET | Refills: 1 | Status: SHIPPED | OUTPATIENT
Start: 2025-08-21